# Patient Record
Sex: MALE | Race: BLACK OR AFRICAN AMERICAN | Employment: OTHER | URBAN - METROPOLITAN AREA
[De-identification: names, ages, dates, MRNs, and addresses within clinical notes are randomized per-mention and may not be internally consistent; named-entity substitution may affect disease eponyms.]

---

## 2017-01-12 ENCOUNTER — TELEPHONE (OUTPATIENT)
Dept: CARDIOLOGY CLINIC | Age: 72
End: 2017-01-12

## 2017-01-12 ENCOUNTER — OFFICE VISIT (OUTPATIENT)
Dept: CARDIOLOGY CLINIC | Age: 72
End: 2017-01-12

## 2017-01-12 VITALS
OXYGEN SATURATION: 93 % | HEART RATE: 72 BPM | RESPIRATION RATE: 16 BRPM | SYSTOLIC BLOOD PRESSURE: 204 MMHG | DIASTOLIC BLOOD PRESSURE: 100 MMHG | WEIGHT: 193.6 LBS | HEIGHT: 70 IN | BODY MASS INDEX: 27.72 KG/M2

## 2017-01-12 DIAGNOSIS — I10 MALIGNANT HYPERTENSION: ICD-10-CM

## 2017-01-12 DIAGNOSIS — I48.20 CHRONIC A-FIB (HCC): Primary | ICD-10-CM

## 2017-01-12 DIAGNOSIS — Z79.01 CHRONIC ANTICOAGULATION: ICD-10-CM

## 2017-01-12 DIAGNOSIS — N18.2 CKD (CHRONIC KIDNEY DISEASE), STAGE 2 (MILD): ICD-10-CM

## 2017-01-12 DIAGNOSIS — Z87.891 HISTORY OF TOBACCO USE: ICD-10-CM

## 2017-01-12 NOTE — PROGRESS NOTES
[address]: Malgorzata Durbin  [phone]    History of Present Illness:   Mr. Nae Newsome is a 67 yo M with a recent hospitalization with malignant hypertension. He was also found to be in atrial fibrillation, which was a new diagnosis for him. He had apparently run out of his blood pressure medications and his blood pressure was very elevated. He was started on blood pressure medications for his atrial fibrillation. Discussed Coumadin to prevent stroke. He has been taking this without any bleeding issues and is getting his INR checked at his primary care physician's. Echocardiogram at that time, 12/27/2016 was normal with an EF of 55-60% and mild mitral regurgitation. Since his hospitalization, he has been feeling well with no chest pain, shortness of breath or palpitations, lightheadedness or dizziness. He says he has been compliant with his medications. He has a way of checking his blood pressure, but has not checked this yet since he left the hospital.  His blood pressure here is elevated at 204/100. Assessment and Plan:   1. Atrial fibrillation. Heart rate is overall controlled and no changes made. He has been tolerating Coumadin and recommended this for stroke prevention. 2. Essential hypertension. He will call and reconfirm the current medications he is on. Hopefully, they correlate with his discharge medications. He will check his blood pressure daily for the next week and will make adjustments as needed. Blood pressure could be partly up with white coat hypertension. Will likely need to make a few titrations. 3. Remote tobacco use. 4. Chronic kidney disease, stage II-III. If he is not followed by nephrology, would consider this. 5. Hypokalemia. He stopped his Hydrochlorothiazide during his hospitalization. Soc hx. Quit tobacco many yrs ago  Fam hx. No early CAD    He  has a past medical history of Hypertension. All other systems negative except as above. PE  Vitals:    01/12/17 1154   BP: (!) 204/100   Pulse: 72   Resp: 16   SpO2: 93%   Weight: 193 lb 9.6 oz (87.8 kg)   Height: 5' 10\" (1.778 m)    Body mass index is 27.78 kg/(m^2). General appearance - alert, well appearing, and in no distress  Mental status - affect appropriate to mood  Eyes - sclera anicteric, moist mucous membranes  Neck - supple, no JVD  Chest - clear to auscultation, no wheezes, rales or rhonchi  Heart - irregularly irregular, normal S1, S2, I/VI systolic murmur LUSB  Abdomen - soft, nontender, nondistended, no masses or organomegaly  Neurological -no focal deficit  Extremities - peripheral pulses normal, no pedal edema      Recent Labs:  Lab Results   Component Value Date/Time    Cholesterol, total 226 12/28/2016 03:15 AM    HDL Cholesterol 56 12/28/2016 03:15 AM    LDL, calculated 153.2 12/28/2016 03:15 AM    Triglyceride 84 12/28/2016 03:15 AM    CHOL/HDL Ratio 4.0 12/28/2016 03:15 AM     Lab Results   Component Value Date/Time    Creatinine 1.61 12/30/2016 03:02 AM     Lab Results   Component Value Date/Time    BUN 27 12/30/2016 03:02 AM     Lab Results   Component Value Date/Time    Potassium 3.3 12/30/2016 03:02 AM     Lab Results   Component Value Date/Time    Hemoglobin A1c 5.4 12/28/2016 03:15 AM     Lab Results   Component Value Date/Time    HGB 15.8 12/30/2016 03:02 AM     Lab Results   Component Value Date/Time    PLATELET 875 16/11/9151 03:02 AM       Reviewed:  Past Medical History   Diagnosis Date    Hypertension      History   Smoking Status    Never Smoker   Smokeless Tobacco    Not on file     History   Alcohol Use No     No Known Allergies    Current Outpatient Prescriptions   Medication Sig    carvedilol (COREG) 12.5 mg tablet Take 1 Tab by mouth two (2) times daily (with meals).  hydrALAZINE (APRESOLINE) 50 mg tablet Take 1 Tab by mouth every eight (8) hours.  lisinopril (PRINIVIL, ZESTRIL) 20 mg tablet Take 1 Tab by mouth every twelve (12) hours.     warfarin (COUMADIN) 2.5 mg tablet Take 3 tablets tomorrow 12/31 and then 2 tablets every afternoon until you can check your INR, then the dose may have to be adjusted.  amLODIPine (NORVASC) 10 mg tablet Take 10 mg by mouth daily.  bimatoprost (LUMIGAN) 0.01 % ophthalmic drops Administer 1 Drop to both eyes.  brimonidine (ALPHAGAN P) 0.1 % ophthalmic solution Administer 1 Drop to both eyes daily.  dorzolamide (TRUSOPT) 2 % ophthalmic solution Administer 1 Drop to both eyes.  timolol (TIMOPTIC) 0.5 % ophthalmic solution Administer 1 Drop to both eyes. No current facility-administered medications for this visit.         Raymundo Akbar MD  [facility] [place]  [facility], [address] [facility] [address]  [address]

## 2017-01-12 NOTE — TELEPHONE ENCOUNTER
Pt called with his Bp reading and his list of medications. .. Christophe Park 188/114 Pt BP  Lisinopril   Hydralazine  Warfarin  Carvedilol    Pt does not know the dosage of medications. Pt can be reached [phone]. Thank you.

## 2017-01-12 NOTE — LETTER
1/13/2017 12:42 AM 
 
Patient:  Valeria Hernadez. YOB: 1945 Date of Visit: 1/12/2017 Dear Mónica Cabrera MD 
1 [address] [address] VIA Facsimile: [phone] 
 : 
 
Mr. Malathi Stein is a 67 yo M with a recent hospitalization with malignant hypertension. He was also found to be in atrial fibrillation, which was a new diagnosis for him. He had apparently run out of his blood pressure medications and his blood pressure was very elevated. He was started on blood pressure medications for his atrial fibrillation. Discussed Coumadin to prevent stroke. He has been taking this without any bleeding issues and is getting his INR checked at his primary care physician's. Echocardiogram at that time, 12/27/2016 was normal with an EF of 55-60% and mild mitral regurgitation. Since his hospitalization, he has been feeling well with no chest pain, shortness of breath or palpitations, lightheadedness or dizziness. He says he has been compliant with his medications. He has a way of checking his blood pressure, but has not checked this yet since he left the hospital.  His blood pressure here is elevated at 204/100. Assessment and Plan: 1. Atrial fibrillation. Heart rate is overall controlled and no changes made. He has been tolerating Coumadin and recommended this for stroke prevention. 2. Essential hypertension. He will call and reconfirm the current medications he is on. Hopefully, they correlate with his discharge medications. He will check his blood pressure daily for the next week and will make adjustments as needed. Blood pressure could be partly up with white coat hypertension. Will likely need to make a few titrations. 3. Remote tobacco use. 4. Chronic kidney disease, stage II-III. If he is not followed by nephrology, would consider this. 5. Hypokalemia. He stopped his Hydrochlorothiazide during his hospitalization. If you have questions, please do not hesitate to call me. Sincerely, Vivek Hastings MD

## 2017-01-12 NOTE — MR AVS SNAPSHOT
Visit Information Date & Time Provider Department Dept. Phone Encounter #  
 1/12/2017 11:00 AM Jean Tomlin MD [facility] [phone] [id number] Your Appointments 4/27/2017  8:40 AM  
ESTABLISHED PATIENT with Jean Tomlin MD  
[facility] ([address]) Appt Note: 3-4 month follow up  
 [phone]  
[address] [facility] [address] Upcoming Health Maintenance Date Due DTaP/Tdap/Td series (1 - Tdap) 1/15/1965 FOBT Q 1 YEAR AGE 50-75 1/15/1994 ZOSTER VACCINE AGE 60> 1/15/2004 GLAUCOMA SCREENING Q2Y 1/15/2009 Pneumococcal 65+ Low/Medium Risk (1 of 2 - PCV13) 1/15/2009 MEDICARE YEARLY EXAM 1/15/2009 INFLUENZA AGE 9 TO ADULT 8/1/2016 Allergies as of 1/12/2017  Review Complete On: 1/12/2017 By: Jyotsna Borjas RN No Known Allergies Current Immunizations  Never Reviewed No immunizations on file. Not reviewed this visit Vitals BP Pulse Resp Height(growth percentile) Weight(growth percentile) SpO2  
 (!) 204/100 (BP 1 Location: Right arm, BP Patient Position: Sitting) 72 16 5' 10\" (1.778 m) 193 lb 9.6 oz (87.8 kg) 93% BMI Smoking Status 27.78 kg/m2 Never Smoker BMI and BSA Data Body Mass Index Body Surface Area  
 27.78 kg/m 2 2.08 m 2 Your Updated Medication List  
  
   
This list is accurate as of: 1/12/17 12:09 PM.  Always use your most recent med list.  
  
  
  
  
 ALPHAGAN P 0.1 % ophthalmic solution Generic drug:  brimonidine Administer 1 Drop to both eyes daily. amLODIPine 10 mg tablet Commonly known as:  Nicolette Fraction Take 10 mg by mouth daily. bimatoprost 0.01 % ophthalmic drops Commonly known as:  LUMIGAN Administer 1 Drop to both eyes. carvedilol 12.5 mg tablet Commonly known as:  Brianda Hurst  
 Take 1 Tab by mouth two (2) times daily (with meals). dorzolamide 2 % ophthalmic solution Commonly known as:  TRUSOPT Administer 1 Drop to both eyes. hydrALAZINE 50 mg tablet Commonly known as:  APRESOLINE Take 1 Tab by mouth every eight (8) hours. lisinopril 20 mg tablet Commonly known as:  Dominique Harris Take 1 Tab by mouth every twelve (12) hours. timolol 0.5 % ophthalmic solution Commonly known as:  TIMOPTIC Administer 1 Drop to both eyes. warfarin 2.5 mg tablet Commonly known as:  COUMADIN Take 3 tablets tomorrow 12/31 and then 2 tablets every afternoon until you can check your INR, then the dose may have to be adjusted. Introducing [facility]! [employer] introduces QuickPay patient portal. Now you can access parts of your medical record, email your doctor's office, and request medication refills online. 1. In your internet browser, go to [email or web address] 2. Click on the First Time User? Click Here link in the Sign In box. You will see the New Member Sign Up page. 3. Enter your QuickPay Access Code exactly as it appears below. You will not need to use this code after youve completed the sign-up process. If you do not sign up before the expiration date, you must request a new code. · QuickPay Access Code: [email or web address] Expires: 3/27/2017 11:12 AM 
 
4. Enter the last four digits of your Social Security Number (xxxx) and Date of Birth (mm/dd/yyyy) as indicated and click Submit. You will be taken to the next sign-up page. 5. Create a [employer] ID. This will be your QuickPay login ID and cannot be changed, so think of one that is secure and easy to remember. 6. Create a QuickPay password. You can change your password at any time. 7. Enter your Password Reset Question and Answer. This can be used at a later time if you forget your password. 8. Enter your e-mail address. You will receive e-mail notification when new information is available in [address] 9. Click Sign Up. You can now view and download portions of your medical record. 10. Click the Download Summary menu link to download a portable copy of your medical information. If you have questions, please visit the Frequently Asked Questions section of the sezmi website. Remember, sezmi is NOT to be used for urgent needs. For medical emergencies, dial 911. Now available from your iPhone and Android! Please provide this summary of care documentation to your next provider. Your primary care clinician is listed as Moni Mejia. If you have any questions after today's visit, please call [phone].

## 2017-01-13 PROBLEM — N18.9 CKD (CHRONIC KIDNEY DISEASE): Status: ACTIVE | Noted: 2017-01-13

## 2017-01-13 PROBLEM — Z79.01 CHRONIC ANTICOAGULATION: Status: ACTIVE | Noted: 2017-01-13

## 2017-01-13 PROBLEM — I48.20 CHRONIC A-FIB (HCC): Status: ACTIVE | Noted: 2017-01-13

## 2017-01-13 PROBLEM — Z87.891 HISTORY OF TOBACCO USE: Status: ACTIVE | Noted: 2017-01-13

## 2017-01-13 NOTE — TELEPHONE ENCOUNTER
Left message for patient to return call regarding medications and blood pressure. Dr. Britany Khan would like them to add amlodipine and check blood pressure for one week and call back in one week to report on blood pressure. Patient identified with 2 identifiers  Called back and spoke with wife. Reviewed medication again with her and verified that patient is not taking and does not have any amlodipine at home. Will call in prescription for amlodipine and he will start taking it today, she will take his blood pressures twice per day, at least one hour after taking his medications, and call on Friday or next Monday with readings. She states understanding and has no further questions.

## 2017-01-16 ENCOUNTER — TELEPHONE (OUTPATIENT)
Dept: CARDIOLOGY CLINIC | Age: 72
End: 2017-01-16

## 2017-01-16 RX ORDER — AMLODIPINE BESYLATE 10 MG/1
10 TABLET ORAL DAILY
Qty: 30 TAB | Refills: 6 | Status: SHIPPED | OUTPATIENT
Start: 2017-01-16 | End: 2017-01-17 | Stop reason: SDUPTHER

## 2017-01-16 NOTE — TELEPHONE ENCOUNTER
Patient identified with 2 identifiers  Mrs. Dc Steward called back to say that patient's blood pressure was 152/84, before starting amlodipine. Prescription called in is for 10 mg. Please advise. Called patient 's wife back and advised her to start amlodipine per Dr. Tequila Betancourt.  She states understanding and will call back for any problems.

## 2017-01-17 RX ORDER — AMLODIPINE BESYLATE 10 MG/1
10 TABLET ORAL DAILY
Qty: 30 TAB | Refills: 6 | Status: SHIPPED | OUTPATIENT
Start: 2017-01-17 | End: 2017-01-30 | Stop reason: SDUPTHER

## 2017-01-30 RX ORDER — HYDRALAZINE HYDROCHLORIDE 50 MG/1
50 TABLET, FILM COATED ORAL EVERY 8 HOURS
Qty: 90 TAB | Refills: 3 | Status: SHIPPED | OUTPATIENT
Start: 2017-01-30 | End: 2017-01-30 | Stop reason: SDUPTHER

## 2017-01-30 RX ORDER — LISINOPRIL 20 MG/1
20 TABLET ORAL EVERY 12 HOURS
Qty: 60 TAB | Refills: 3 | Status: SHIPPED | OUTPATIENT
Start: 2017-01-30 | End: 2017-01-30 | Stop reason: SDUPTHER

## 2017-01-30 RX ORDER — CARVEDILOL 12.5 MG/1
12.5 TABLET ORAL 2 TIMES DAILY WITH MEALS
Qty: 60 TAB | Refills: 3 | Status: SHIPPED | OUTPATIENT
Start: 2017-01-30 | End: 2017-01-30 | Stop reason: SDUPTHER

## 2017-03-19 RX ORDER — WARFARIN 2.5 MG/1
TABLET ORAL
Qty: 30 TAB | Refills: 0 | Status: SHIPPED | OUTPATIENT
Start: 2017-03-19 | End: 2018-03-28

## 2017-06-02 RX ORDER — WARFARIN 2.5 MG/1
TABLET ORAL
Qty: 30 TAB | Refills: 0 | OUTPATIENT
Start: 2017-06-02

## 2017-06-05 RX ORDER — WARFARIN 2.5 MG/1
TABLET ORAL
Qty: 30 TAB | Refills: 0 | OUTPATIENT
Start: 2017-06-05

## 2017-06-12 RX ORDER — WARFARIN 2.5 MG/1
TABLET ORAL
Qty: 30 TAB | Refills: 0 | OUTPATIENT
Start: 2017-06-12

## 2017-06-22 ENCOUNTER — OFFICE VISIT (OUTPATIENT)
Dept: CARDIOLOGY CLINIC | Age: 72
End: 2017-06-22

## 2017-06-22 VITALS
WEIGHT: 191.4 LBS | BODY MASS INDEX: 26.8 KG/M2 | HEART RATE: 80 BPM | DIASTOLIC BLOOD PRESSURE: 94 MMHG | SYSTOLIC BLOOD PRESSURE: 160 MMHG | HEIGHT: 71 IN

## 2017-06-22 DIAGNOSIS — N18.2 CKD (CHRONIC KIDNEY DISEASE), STAGE 2 (MILD): ICD-10-CM

## 2017-06-22 DIAGNOSIS — Z79.01 CHRONIC ANTICOAGULATION: ICD-10-CM

## 2017-06-22 DIAGNOSIS — I48.20 CHRONIC A-FIB (HCC): Primary | ICD-10-CM

## 2017-06-22 DIAGNOSIS — I10 BENIGN ESSENTIAL HTN: ICD-10-CM

## 2017-06-22 NOTE — PROGRESS NOTES
[address]: Salma John  [phone]) [phone]    History of Present Illness:   Mr. Kenny Cisneros is a 65 yo M hospitalization for malignant hypertension in past and was found to be in atrial fibrillation, which was a new diagnosis for him. He had apparently run out of his blood pressure medications and his blood pressure was very elevated. Echo 12/27/2016 was normal with an EF of 55-60% and mild mitral regurgitation. Since his last visit, overall he has been doing well. He denies any palpitations. No chest pain, no shortness of breath. He has been complaint with his medications. When he checks his blood pressure at home, it has been in the 868L systolic. When he has had his blood pressure checked at his doctor's office and here today, it has been more elevated and is 160/94 here. He is compensated on exam with clear lungs. He has trace lower extremity edema. Assessment and Plan:   1. Atrial fibrillation. Continues well controlled on beta-blocker and no changes made. 2. Chronic anticoagulation. No bleeding issues on Coumadin. 3. 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 He is being followed by urology. Consider nephrology if indicated. 4. Essential hypertension. Blood pressure is labile and probably has some degree of white coat hypertension. His blood pressure at home has overall been okay in the 140s and will continue his current regimen of Norvasc, Coreg, Lisinopril and Hydralazine. 5. Hypokalemia. Occurred in the past with Hydrochlorothiazide and would avoid this. Soc hx. Quit tobacco many yrs ago  Fam hx. No early CAD    He  has a past medical history of Hypertension. All other systems negative except as above. PE  Vitals:    06/22/17 0907   BP: (!) 160/94   Pulse: 80   Weight: 191 lb 6.4 oz (86.8 kg)   Height: 5' 11\" (1.803 m)    Body mass index is 26.69 kg/(m^2).    General appearance - alert, well appearing, and in no distress  Mental status - affect appropriate to mood  Eyes - sclera anicteric, moist mucous membranes  Neck - supple, no JVD  Chest - clear to auscultation, no wheezes, rales or rhonchi  Heart - irregularly irregular, normal S1, S2, I/VI systolic murmur LUSB  Abdomen - soft, nontender, nondistended, no masses or organomegaly  Neurological -no focal deficit  Extremities - peripheral pulses normal, no pedal edema      Recent Labs:  Lab Results   Component Value Date/Time    Cholesterol, total 226 12/28/2016 03:15 AM    HDL Cholesterol 56 12/28/2016 03:15 AM    LDL, calculated 153.2 12/28/2016 03:15 AM    Triglyceride 84 12/28/2016 03:15 AM    CHOL/HDL Ratio 4.0 12/28/2016 03:15 AM     Lab Results   Component Value Date/Time    Creatinine 1.61 12/30/2016 03:02 AM     Lab Results   Component Value Date/Time    BUN 27 12/30/2016 03:02 AM     Lab Results   Component Value Date/Time    Potassium 3.3 12/30/2016 03:02 AM     Lab Results   Component Value Date/Time    Hemoglobin A1c 5.4 12/28/2016 03:15 AM     Lab Results   Component Value Date/Time    HGB 15.8 12/30/2016 03:02 AM     Lab Results   Component Value Date/Time    PLATELET 603 76/91/1333 03:02 AM       Reviewed:  Past Medical History:   Diagnosis Date    Hypertension      History   Smoking Status    Never Smoker   Smokeless Tobacco    Not on file     History   Alcohol Use No     No Known Allergies    Current Outpatient Prescriptions   Medication Sig    warfarin (COUMADIN) 2.5 mg tablet TAKE 3 TABLETS TOMORROW AND THEN TAKE 2 TABLETS EVERY AFTERNOON UNTIL YOU CAN CHECK YOUR INR,THE DOSE MAY HAVE TO BE ADJUSTED    amLODIPine (NORVASC) 10 mg tablet Take 1 Tab by mouth daily. Indications: please call patient when ready    carvedilol (COREG) 12.5 mg tablet Take 1 Tab by mouth two (2) times daily (with meals).  lisinopril (PRINIVIL, ZESTRIL) 20 mg tablet Take 1 Tab by mouth every twelve (12) hours.  hydrALAZINE (APRESOLINE) 50 mg tablet Take 1 Tab by mouth every eight (8) hours.     bimatoprost (LUMIGAN) 0.01 % ophthalmic drops Administer 1 Drop to both eyes.  brimonidine (ALPHAGAN P) 0.1 % ophthalmic solution Administer 1 Drop to both eyes daily.  dorzolamide (TRUSOPT) 2 % ophthalmic solution Administer 1 Drop to both eyes.  timolol (TIMOPTIC) 0.5 % ophthalmic solution Administer 1 Drop to both eyes. No current facility-administered medications for this visit.         Zev Gilmore MD  [facility] heart and Vascular [place]  [address] [facility]  [address]

## 2017-06-22 NOTE — MR AVS SNAPSHOT
Visit Information Date & Time Provider Department Dept. Phone Encounter #  
 6/22/2017  9:00 AM Yokasta Wilder MD [facility] [phone] [id number] Your Appointments 12/21/2017  9:40 AM  
ESTABLISHED PATIENT with Yokasta Wilder MD  
[facility] ([facility]) Appt Note: 6 month follow up  
 [phone]  
[address] [address] Upcoming Health Maintenance Date Due DTaP/Tdap/Td series (1 - Tdap) 1/15/1965 FOBT Q 1 YEAR AGE 50-75 1/15/1994 ZOSTER VACCINE AGE 60> 1/15/2004 GLAUCOMA SCREENING Q2Y 1/15/2009 Pneumococcal 65+ Low/Medium Risk (1 of 2 - PCV13) 1/15/2009 MEDICARE YEARLY EXAM 1/15/2009 INFLUENZA AGE 9 TO ADULT 8/1/2017 Allergies as of 6/22/2017  Review Complete On: 6/22/2017 By: Faviola Mitchell LPN No Known Allergies Current Immunizations  Never Reviewed No immunizations on file. Not reviewed this visit Vitals BP Pulse Height(growth percentile) Weight(growth percentile) BMI Smoking Status (!) 160/94 80 5' 11\" (1.803 m) 191 lb 6.4 oz (86.8 kg) 26.69 kg/m2 Never Smoker Vitals History BMI and BSA Data Body Mass Index Body Surface Area  
 26.69 kg/m 2 2.09 m 2 Preferred Pharmacy Pharmacy Name Phone [facility] [address] [place] [address] [phone] Your Updated Medication List  
  
   
This list is accurate as of: 6/22/17  9:37 AM.  Always use your most recent med list.  
  
  
  
  
 ALPHAGAN P 0.1 % ophthalmic solution Generic drug:  brimonidine Administer 1 Drop to both eyes daily. amLODIPine 10 mg tablet Commonly known as:  Marla Velazco Take 1 Tab by mouth daily. Indications: please call patient when ready  
  
 bimatoprost 0.01 % ophthalmic drops Commonly known as:  LUMIGAN Administer 1 Drop to both eyes. carvedilol 12.5 mg tablet Commonly known as:  Ladona Jimenez Take 1 Tab by mouth two (2) times daily (with meals). dorzolamide 2 % ophthalmic solution Commonly known as:  TRUSOPT Administer 1 Drop to both eyes. hydrALAZINE 50 mg tablet Commonly known as:  APRESOLINE Take 1 Tab by mouth every eight (8) hours. lisinopril 20 mg tablet Commonly known as:  Randall Banner Take 1 Tab by mouth every twelve (12) hours. timolol 0.5 % ophthalmic solution Commonly known as:  TIMOPTIC Administer 1 Drop to both eyes. warfarin 2.5 mg tablet Commonly known as:  COUMADIN  
TAKE 3 TABLETS TOMORROW AND THEN TAKE 2 TABLETS EVERY AFTERNOON UNTIL YOU CAN CHECK YOUR INR,THE DOSE MAY HAVE TO BE ADJUSTED Introducing [facility]! [facility] introduces Sparkcentral patient portal. Now you can access parts of your medical record, email your doctor's office, and request medication refills online. 1. In your internet browser, go to [email or web address] 2. Click on the First Time User? Click Here link in the Sign In box. You will see the New Member Sign Up page. 3. Enter your Sparkcentral Access Code exactly as it appears below. You will not need to use this code after youve completed the sign-up process. If you do not sign up before the expiration date, you must request a new code. · Sparkcentral Access Code: [email or web address] Expires: 9/19/2017  9:36 AM 
 
4. Enter the last four digits of your Social Security Number (xxxx) and Date of Birth (mm/dd/yyyy) as indicated and click Submit. You will be taken to the next sign-up page. 5. Create a Sparkcentral ID. This will be your Sparkcentral login ID and cannot be changed, so think of one that is secure and easy to remember. 6. Create a Sparkcentral password. You can change your password at any time. 7. Enter your Password Reset Question and Answer. This can be used at a later time if you forget your password. 8. Enter your e-mail address. You will receive e-mail notification when new information is available in [address] 9. Click Sign Up. You can now view and download portions of your medical record. 10. Click the Download Summary menu link to download a portable copy of your medical information. If you have questions, please visit the Frequently Asked Questions section of the [employer] website. Remember, [employer] is NOT to be used for urgent needs. For medical emergencies, dial 911. Now available from your iPhone and Android! Please provide this summary of care documentation to your next provider. Your primary care clinician is listed as Indigo Harvey. If you have any questions after today's visit, please call [phone].

## 2017-06-23 PROBLEM — I10 BENIGN ESSENTIAL HTN: Status: ACTIVE | Noted: 2017-06-23

## 2018-03-28 ENCOUNTER — APPOINTMENT (OUTPATIENT)
Dept: GENERAL RADIOLOGY | Age: 73
End: 2018-03-28
Attending: EMERGENCY MEDICINE
Payer: MEDICARE

## 2018-03-28 ENCOUNTER — HOSPITAL ENCOUNTER (EMERGENCY)
Age: 73
Discharge: HOME | End: 2018-03-28
Attending: EMERGENCY MEDICINE | Admitting: EMERGENCY MEDICINE
Payer: MEDICARE

## 2018-03-28 VITALS
TEMPERATURE: 98.6 F | RESPIRATION RATE: 28 BRPM | HEIGHT: 70 IN | OXYGEN SATURATION: 98 % | SYSTOLIC BLOOD PRESSURE: 181 MMHG | HEART RATE: 96 BPM | WEIGHT: 184 LBS | BODY MASS INDEX: 26.34 KG/M2 | DIASTOLIC BLOOD PRESSURE: 110 MMHG

## 2018-03-28 DIAGNOSIS — I48.91 ATRIAL FIBRILLATION, UNSPECIFIED TYPE (HCC): ICD-10-CM

## 2018-03-28 DIAGNOSIS — Z01.89 LABORATORY TEST: Primary | ICD-10-CM

## 2018-03-28 LAB
ALBUMIN SERPL-MCNC: 3.7 G/DL (ref 3.5–5)
ALBUMIN/GLOB SERPL: 0.8 {RATIO} (ref 1.1–2.2)
ALP SERPL-CCNC: 74 U/L (ref 45–117)
ALT SERPL-CCNC: 19 U/L (ref 12–78)
ANION GAP SERPL CALC-SCNC: 10 MMOL/L (ref 5–15)
APPEARANCE UR: CLEAR
AST SERPL-CCNC: 28 U/L (ref 15–37)
ATRIAL RATE: 100 BPM
ATRIAL RATE: 52 BPM
BACTERIA URNS QL MICRO: NEGATIVE /HPF
BASOPHILS # BLD: 0 K/UL (ref 0–0.1)
BASOPHILS NFR BLD: 0 % (ref 0–1)
BILIRUB SERPL-MCNC: 0.8 MG/DL (ref 0.2–1)
BILIRUB UR QL: NEGATIVE
BUN SERPL-MCNC: 17 MG/DL (ref 6–20)
BUN/CREAT SERPL: 12 (ref 12–20)
CALCIUM SERPL-MCNC: 9.2 MG/DL (ref 8.5–10.1)
CALCULATED R AXIS, ECG10: -20 DEGREES
CALCULATED R AXIS, ECG10: 7 DEGREES
CALCULATED T AXIS, ECG11: -151 DEGREES
CALCULATED T AXIS, ECG11: 158 DEGREES
CHLORIDE SERPL-SCNC: 104 MMOL/L (ref 97–108)
CO2 SERPL-SCNC: 27 MMOL/L (ref 21–32)
COLOR UR: YELLOW
CREAT SERPL-MCNC: 1.46 MG/DL (ref 0.7–1.3)
DIAGNOSIS, 93000: NORMAL
DIAGNOSIS, 93000: NORMAL
DIFFERENTIAL METHOD BLD: ABNORMAL
EOSINOPHIL # BLD: 0 K/UL (ref 0–0.4)
EOSINOPHIL NFR BLD: 0 % (ref 0–7)
EPITH CASTS URNS QL MICRO: NORMAL /LPF
ERYTHROCYTE [DISTWIDTH] IN BLOOD BY AUTOMATED COUNT: 14.5 % (ref 11.5–14.5)
GLOBULIN SER CALC-MCNC: 4.5 G/DL (ref 2–4)
GLUCOSE SERPL-MCNC: 110 MG/DL (ref 65–100)
GLUCOSE UR STRIP.AUTO-MCNC: NEGATIVE MG/DL
HCT VFR BLD AUTO: 49.2 % (ref 36.6–50.3)
HGB BLD-MCNC: 17 G/DL (ref 12.1–17)
HGB UR QL STRIP: NEGATIVE
IMM GRANULOCYTES # BLD: 0 K/UL (ref 0–0.04)
IMM GRANULOCYTES NFR BLD AUTO: 0 % (ref 0–0.5)
KETONES UR QL STRIP.AUTO: NORMAL MG/DL
LEUKOCYTE ESTERASE UR QL STRIP.AUTO: NEGATIVE
LYMPHOCYTES # BLD: 1.3 K/UL (ref 0.8–3.5)
LYMPHOCYTES NFR BLD: 19 % (ref 12–49)
MAGNESIUM SERPL-MCNC: 1.9 MG/DL (ref 1.6–2.4)
MCH RBC QN AUTO: 29.4 PG (ref 26–34)
MCHC RBC AUTO-ENTMCNC: 34.6 G/DL (ref 30–36.5)
MCV RBC AUTO: 85.1 FL (ref 80–99)
MONOCYTES # BLD: 0.5 K/UL (ref 0–1)
MONOCYTES NFR BLD: 7 % (ref 5–13)
NEUTS SEG # BLD: 5.1 K/UL (ref 1.8–8)
NEUTS SEG NFR BLD: 74 % (ref 32–75)
NITRITE UR QL STRIP.AUTO: NEGATIVE
NRBC # BLD: 0 K/UL (ref 0–0.01)
NRBC BLD-RTO: 0 PER 100 WBC
P-R INTERVAL, ECG05: 224 MS
PH UR STRIP: 7 [PH]
PLATELET # BLD AUTO: 225 K/UL (ref 150–400)
PMV BLD AUTO: 11.7 FL (ref 8.9–12.9)
POTASSIUM SERPL-SCNC: 3.4 MMOL/L (ref 3.5–5.1)
PROT SERPL-MCNC: 8.2 G/DL (ref 6.4–8.2)
PROT UR STRIP-MCNC: NORMAL MG/DL
Q-T INTERVAL, ECG07: 386 MS
Q-T INTERVAL, ECG07: 390 MS
QRS DURATION, ECG06: 124 MS
QRS DURATION, ECG06: 130 MS
QTC CALCULATION (BEZET), ECG08: 515 MS
QTC CALCULATION (BEZET), ECG08: 538 MS
RBC # BLD AUTO: 5.78 M/UL (ref 4.1–5.7)
RBC #/AREA URNS HPF: NORMAL /HPF (ref 0–5)
SODIUM SERPL-SCNC: 141 MMOL/L (ref 136–145)
SP GR UR REFRACTOMETRY: 1.01
TROPONIN I SERPL-MCNC: 0.04 NG/ML
UR CULT HOLD, URHOLD: NORMAL
UROBILINOGEN UR QL STRIP.AUTO: 0.2 EU/DL
VENTRICULAR RATE, ECG03: 105 BPM
VENTRICULAR RATE, ECG03: 117 BPM
WBC # BLD AUTO: 6.9 K/UL (ref 4.1–11.1)
WBC URNS QL MICRO: NORMAL /HPF (ref 0–4)

## 2018-03-28 PROCEDURE — 85025 COMPLETE CBC W/AUTO DIFF WBC: CPT | Performed by: PHYSICIAN ASSISTANT

## 2018-03-28 PROCEDURE — 96361 HYDRATE IV INFUSION ADD-ON: CPT

## 2018-03-28 PROCEDURE — 71045 X-RAY EXAM CHEST 1 VIEW: CPT

## 2018-03-28 PROCEDURE — 81001 URINALYSIS AUTO W/SCOPE: CPT | Performed by: EMERGENCY MEDICINE

## 2018-03-28 PROCEDURE — 84484 ASSAY OF TROPONIN QUANT: CPT | Performed by: EMERGENCY MEDICINE

## 2018-03-28 PROCEDURE — 80053 COMPREHEN METABOLIC PANEL: CPT | Performed by: PHYSICIAN ASSISTANT

## 2018-03-28 PROCEDURE — 83735 ASSAY OF MAGNESIUM: CPT | Performed by: EMERGENCY MEDICINE

## 2018-03-28 PROCEDURE — 74011000250 HC RX REV CODE- 250: Performed by: EMERGENCY MEDICINE

## 2018-03-28 PROCEDURE — 93005 ELECTROCARDIOGRAM TRACING: CPT

## 2018-03-28 PROCEDURE — 74011250636 HC RX REV CODE- 250/636: Performed by: EMERGENCY MEDICINE

## 2018-03-28 PROCEDURE — 96374 THER/PROPH/DIAG INJ IV PUSH: CPT

## 2018-03-28 PROCEDURE — 74011250637 HC RX REV CODE- 250/637: Performed by: EMERGENCY MEDICINE

## 2018-03-28 PROCEDURE — 99285 EMERGENCY DEPT VISIT HI MDM: CPT

## 2018-03-28 PROCEDURE — 36415 COLL VENOUS BLD VENIPUNCTURE: CPT | Performed by: PHYSICIAN ASSISTANT

## 2018-03-28 RX ORDER — LISINOPRIL 40 MG/1
40 TABLET ORAL 2 TIMES DAILY
COMMUNITY

## 2018-03-28 RX ORDER — METOPROLOL TARTRATE 5 MG/5ML
5 INJECTION INTRAVENOUS
Status: COMPLETED | OUTPATIENT
Start: 2018-03-28 | End: 2018-03-28

## 2018-03-28 RX ORDER — LEVOTHYROXINE SODIUM 25 UG/1
25 TABLET ORAL
COMMUNITY

## 2018-03-28 RX ORDER — POTASSIUM CHLORIDE 750 MG/1
40 TABLET, FILM COATED, EXTENDED RELEASE ORAL
Status: COMPLETED | OUTPATIENT
Start: 2018-03-28 | End: 2018-03-28

## 2018-03-28 RX ADMIN — POTASSIUM CHLORIDE 40 MEQ: 750 TABLET, EXTENDED RELEASE ORAL at 19:06

## 2018-03-28 RX ADMIN — METOPROLOL TARTRATE 5 MG: 5 INJECTION, SOLUTION INTRAVENOUS at 18:57

## 2018-03-28 RX ADMIN — SODIUM CHLORIDE 1000 ML: 900 INJECTION, SOLUTION INTRAVENOUS at 17:55

## 2018-03-28 NOTE — ED TRIAGE NOTES
Reports had blood drawn this morning and was told he had low potassium. Wife reports increased fatigue. Denies N/V/D.   Denies Chest pain

## 2018-03-28 NOTE — DISCHARGE INSTRUCTIONS
Atrial Fibrillation: Care Instructions  Your Care Instructions    Atrial fibrillation is an irregular and often fast heartbeat. Treating this condition is important for several reasons. It can cause blood clots, which can travel from your heart to your brain and cause a stroke. If you have a fast heartbeat, you may feel lightheaded, dizzy, and weak. An irregular heartbeat can also increase your risk for heart failure. Atrial fibrillation is often the result of another heart condition, such as high blood pressure or coronary artery disease. Making changes to improve your heart condition will help you stay healthy and active. Follow-up care is a key part of your treatment and safety. Be sure to make and go to all appointments, and call your doctor if you are having problems. It's also a good idea to know your test results and keep a list of the medicines you take. How can you care for yourself at home? Medicines  ? · Take your medicines exactly as prescribed. Call your doctor if you think you are having a problem with your medicine. You will get more details on the specific medicines your doctor prescribes. ? · If your doctor has given you a blood thinner to prevent a stroke, be sure you get instructions about how to take your medicine safely. Blood thinners can cause serious bleeding problems. ? · Do not take any vitamins, over-the-counter drugs, or herbal products without talking to your doctor first.   ? Lifestyle changes  ? · Do not smoke. Smoking can increase your chance of a stroke and heart attack. If you need help quitting, talk to your doctor about stop-smoking programs and medicines. These can increase your chances of quitting for good. ? · Eat a heart-healthy diet. ? · Stay at a healthy weight. Lose weight if you need to.   ? · Limit alcohol to 2 drinks a day for men and 1 drink a day for women. Too much alcohol can cause health problems. ? · Avoid colds and flu.  Get a pneumococcal vaccine shot. If you have had one before, ask your doctor whether you need another dose. Get a flu shot every year. If you must be around people with colds or flu, wash your hands often. Activity  ? · If your doctor recommends it, get more exercise. Walking is a good choice. Bit by bit, increase the amount you walk every day. Try for at least 30 minutes on most days of the week. You also may want to swim, bike, or do other activities. Your doctor may suggest that you join a cardiac rehabilitation program so that you can have help increasing your physical activity safely. ? · Start light exercise if your doctor says it is okay. Even a small amount will help you get stronger, have more energy, and manage stress. Walking is an easy way to get exercise. Start out by walking a little more than you did in the hospital. Gradually increase the amount you walk. ? · When you exercise, watch for signs that your heart is working too hard. You are pushing too hard if you cannot talk while you are exercising. If you become short of breath or dizzy or have chest pain, sit down and rest immediately. ? · Check your pulse regularly. Place two fingers on the artery at the palm side of your wrist, in line with your thumb. If your heartbeat seems uneven or fast, talk to your doctor. When should you call for help? Call 911 anytime you think you may need emergency care. For example, call if:  ? · You have symptoms of a heart attack. These may include:  ¨ Chest pain or pressure, or a strange feeling in the chest.  ¨ Sweating. ¨ Shortness of breath. ¨ Nausea or vomiting. ¨ Pain, pressure, or a strange feeling in the back, neck, jaw, or upper belly or in one or both shoulders or arms. ¨ Lightheadedness or sudden weakness. ¨ A fast or irregular heartbeat. After you call 911, the  may tell you to chew 1 adult-strength or 2 to 4 low-dose aspirin. Wait for an ambulance. Do not try to drive yourself.    ? · You have symptoms of a stroke. These may include:  ¨ Sudden numbness, tingling, weakness, or loss of movement in your face, arm, or leg, especially on only one side of your body. ¨ Sudden vision changes. ¨ Sudden trouble speaking. ¨ Sudden confusion or trouble understanding simple statements. ¨ Sudden problems with walking or balance. ¨ A sudden, severe headache that is different from past headaches. ? · You passed out (lost consciousness). ?Call your doctor now or seek immediate medical care if:  ? · You have new or increased shortness of breath. ? · You feel dizzy or lightheaded, or you feel like you may faint. ? · Your heart rate becomes irregular. ? · You can feel your heart flutter in your chest or skip heartbeats. Tell your doctor if these symptoms are new or worse. ? Watch closely for changes in your health, and be sure to contact your doctor if you have any problems. Where can you learn more? Go to http://fanny-rosy.info/. Enter U020 in the search box to learn more about \"Atrial Fibrillation: Care Instructions. \"  Current as of: September 21, 2016  Content Version: 11.4  © 9203-3070 [employer]. Care instructions adapted under license by [employer] (which disclaims liability or warranty for this information). If you have questions about a medical condition or this instruction, always ask your healthcare professional. [address] any warranty or liability for your use of this information. Potassium Test: About This Test  What is it? A potassium test checks how much potassium is in your blood or urine. Potassium helps keep the body's water and electrolytes in balance. It is also important in how nerves and muscles work. Potassium levels can be tested by having a blood sample taken or by collecting your urine. Urine potassium can be checked in a single urine sample. But it is more often measured in a 24-hour urine sample.   Why is this test done? A blood or urine test for potassium may be done to:  · Check how well your kidneys are working. · Check levels if you are being treated with medicines such as diuretics or having kidney dialysis. · See if treatment for low or high potassium levels is working. How can you prepare for the test?  · You do not need to do anything before having this test.  · Be sure to tell your doctor about all the nonprescription and prescription medicines and herbs or other supplements you take. There are many medicines and supplements that can affect the results of these tests. What happens before the test?  For 24-hour urine collection, your doctor or lab will usually give you a large container that holds about 1 gallon. What happens during the test?  Blood test  A health professional takes a sample of your blood. One-time urine collection  · Wash your hands to make sure they are clean before you collect the urine. · If the collection cup has a lid, remove it carefully. Set it down with the inner surface up. Do not touch the inside of the cup with your fingers. · Clean the area around your genitals. [de-identified] A man should pull back the foreskin, if present, and clean the head of his penis with medicated towelettes or swabs. ¨ A woman should spread open the genital folds of skin with one hand. Then she should use her other hand to clean the area around the urethra with medicated towelettes or swabs. She should wipe the area from front to back so bacteria from the anus are not wiped across the urethra. · Start urinating into the toilet or urinal. A woman should hold apart the genital folds of skin while she urinates. · After the urine has flowed for a few seconds, place the collection cup into the urine stream. Collect about 2 fluid ounces of this \"midstream\" urine without stopping your flow of urine. · Do not touch the rim of the cup to your genital area.  Do not get toilet paper, pubic hair, stool (feces), menstrual blood, or anything else in the urine sample. · Finish urinating into the toilet or urinal.  · Carefully replace and tighten the lid on the cup. Then return it to the lab. If you are collecting the urine at home and can't get it to the lab in an hour, refrigerate it. Urine collection over 24 hours  · You start collecting your urine in the morning. When you first get up, empty your bladder but do not save this urine. Write down the time that you urinated. This marks the beginning of your 24-hour collection period. · For the next 24 hours, collect all your urine. The large container from your doctor or lab has a small amount of preservative in it. Urinate into a small, clean cup, and then pour the urine into the large container. Do not touch the inside of the container or the cup with your fingers. · Keep the large container in the refrigerator for the 24 hours. · Empty your bladder for the final time at or just before the end of the 24-hour period. Add this urine to the large container and record the time. · Do not get toilet paper, pubic hair, stool (feces), menstrual blood, or other foreign matter in the urine sample. How long does the test take? · A blood test or one-time urine collection will probably take a few minutes. Or you may collect your urine over a period of 24 hours. What happens after the test?  After a blood test:  · You will probably be able to go home right away. · You can go back to your usual activities right away. After a urine test, you will need to return the urine sample to the lab or the doctor's office. When should you call for help? Watch closely for changes in your health, and be sure to contact your doctor if you have questions about the test.  Follow-up care is a key part of your treatment and safety. Be sure to make and go to all appointments, and call your doctor if you are having problems. It's also a good idea to keep a list of the medicines you take.  Ask your doctor when you can expect to have your test results. Where can you learn more? Go to http://fanny-rosy.info/. Enter A911 in the search box to learn more about \"Potassium Test: About This Test.\"  Current as of: October 14, 2016  Content Version: 11.4  © 4813-2146 [employer]. Care instructions adapted under license by [employer] (which disclaims liability or warranty for this information). If you have questions about a medical condition or this instruction, always ask your healthcare professional. [address] any warranty or liability for your use of this information.

## 2018-03-28 NOTE — ED PROVIDER NOTES
HPI Comments: 75 y.o. male with past medical history significant for HTN, a-fib, and CKD who presents from home with chief complaint of abnormal lab result. Pt states he had a follow up appointment with his PCP this morning and had blood work done. Pt was informed he had a low potassium value and was referred to the ED for evaluation. Pt denies history of hypokalemia. Pt regularly takes Amlodipine and Coumadin. Pt denies history of MI, blood clots, or CVA. Pt notes that he has had frequent urination. Pt denies having weakness, SOB, or chest pain. There are no other acute medical concerns at this time. PCP: Nathan Dominguez MD    Note written by Kim Horta, as dictated by Adebayo Nam MD 5:30 PM    The history is provided by the patient and the spouse. Past Medical History:   Diagnosis Date    Atrial fibrillation ([facility] [address]     CKD (chronic kidney disease)     Hypertension        No past surgical history on file. No family history on file. Social History     Social History    Marital status:      Spouse name: N/A    Number of children: N/A    Years of education: N/A     Occupational History    Not on file. Social History Main Topics    Smoking status: Never Smoker    Smokeless tobacco: Not on file    Alcohol use No    Drug use: Not on file    Sexual activity: Not on file     Other Topics Concern    Not on file     Social History Narrative         ALLERGIES: Review of patient's allergies indicates no known allergies. Review of Systems   Constitutional: Negative for activity change, chills and fever. HENT: Negative for nosebleeds, sore throat, trouble swallowing and voice change. Eyes: Negative for visual disturbance. Respiratory: Negative for shortness of breath. Cardiovascular: Negative for chest pain and palpitations. Gastrointestinal: Negative for abdominal pain, constipation, diarrhea and nausea.    Genitourinary: Positive for frequency. Negative for difficulty urinating, dysuria, hematuria and urgency. Musculoskeletal: Negative for back pain, neck pain and neck stiffness. Skin: Negative for color change. Allergic/Immunologic: Negative for immunocompromised state. Neurological: Negative for dizziness, seizures, syncope, weakness, light-headedness, numbness and headaches. Psychiatric/Behavioral: Negative for behavioral problems, confusion, hallucinations, self-injury and suicidal ideas. All other systems reviewed and are negative. Vitals:    03/28/18 1641 03/28/18 1738 03/28/18 1739 03/28/18 1749   BP: 175/71  (!) 144/122    Pulse:  (!) 115 (!) 112    Resp:  21     Temp:  99.6 °F (37.6 °C)     SpO2:  96% 96% 97%   Weight:       Height:                Physical Exam   Constitutional: He is oriented to person, place, and time. He appears well-developed and well-nourished. No distress. HENT:   Head: Normocephalic and atraumatic. Eyes: Pupils are equal, round, and reactive to light. Neck: Normal range of motion. Neck supple. Cardiovascular: Regular rhythm and normal heart sounds. Tachycardia present. Exam reveals no gallop and no friction rub. No murmur heard. Pulmonary/Chest: Effort normal and breath sounds normal. No respiratory distress. He has no wheezes. Abdominal: Soft. Bowel sounds are normal. He exhibits no distension. There is no tenderness. There is no rebound and no guarding. Musculoskeletal: Normal range of motion. Neurological: He is alert and oriented to person, place, and time. Skin: Skin is warm. No rash noted. He is not diaphoretic. Psychiatric: He has a normal mood and affect. His behavior is normal. Judgment and thought content normal.   Nursing note and vitals reviewed. Note written by Grant Shaikh.  Jj Ortiz, as dictated by Michele Moseley MD 5:30 PM       [facility]      ED Course     This is a 79-year-old male with past medical history, review of systems, physical exam as above, presenting with complaints of abnormal lab results. Patient states she was told by his pharmacy that he was required to have lab results called by his primary care physician for refill of medications, was told today that his potassium was low, it is not know what the specific value was. He denies symptoms, including chest pain, shortness of breath, weakness. He is noted to be tachycardic, with clear breath sounds, soft nontender abdomen, normotensive, and afebrile. EKG is remarkable for tachycardia, white QRS, ST depression in the lateral fields that were present on his last study. Plan to obtain CMP, CBC, UA, magnesium, cardiac enzymes, provide fluid bolus for tachycardia, and make a disposition based the patient's diagnostics and response to therapy. Procedures    ED EKG interpretation (1st EKG): Rhythm: tachycardia with PVC's; Rate (approx.): 117 bpm; Axis: left axis deviation; LVH. Wide QRS with ST depression in leads V4-V6. Note written by Ignacia Gray. Jaciel Aguayo, as dictated by Kylee Lauren MD 5:45 PM      ED EKG interpretation (2nd EKG): Rhythm: a-fib with aberrancy and PVC's; Rate (approx.): 105 bpm;   Note written by Ignaica Gray. Jaciel Aguayo, as dictated by Kylee Lauren MD 6:40 PM      7:08 PM  Patient with K+ mildly low, will replete. EKG with afib with abberrancy, PVC's, however without CP or SOB. Will provide IV metop as some runs have been tachycardic, and reassess. 7:42 PM  HE and EKG improvied with additional BBlockade, remains asymptomatic. Will Dc home with PCP and Cardiology follow up.

## 2018-03-28 NOTE — ED NOTES
Pt up and ambulating without getting tachycardic or dizzy. Discharge instructions given to pt by MD and RN . Pt has been given counseling on med use and verbalizes  understanding . IV discontinued . Pt ambulated off unit with no signs of distress.

## 2018-03-28 NOTE — ED NOTES
ECG repeated due to pts heart rate Increasing when patient stood to urinate , no complaints of chest pain , shortness of breath , or dizziness . Strip printed and given to MD. Repeat EKG obtained. Awaiting further orders.

## 2018-04-30 ENCOUNTER — TELEPHONE (OUTPATIENT)
Dept: CARDIOLOGY CLINIC | Age: 73
End: 2018-04-30

## 2018-04-30 NOTE — TELEPHONE ENCOUNTER
LVM for patient/spouse to return call at earliest convenience. Patient will need an appointment prior to clearance.

## 2018-04-30 NOTE — TELEPHONE ENCOUNTER
Patients wife is calling to check on the cardiac clearance for him to come off his warfrin for the prostate procedure   Phone: [phone]

## 2018-05-01 ENCOUNTER — TELEPHONE (OUTPATIENT)
Dept: CARDIOLOGY CLINIC | Age: 73
End: 2018-05-01

## 2018-05-01 NOTE — TELEPHONE ENCOUNTER
Per Dr. Adriana Loomis patient does not need to be seen prior to cardiac clearance. Appointment has been cancelled and clearance letter faxed.   2 pt identifiers used

## 2018-05-01 NOTE — TELEPHONE ENCOUNTER
Spoke to patients wife and advised her that patient needs to be seen for clearance for the procedure. Patient was scheduled for 5/4/18 @ 10:00am. Patients wife verbalized understanding. Patient id verified x2.

## 2019-01-07 ENCOUNTER — HOSPITAL ENCOUNTER (OUTPATIENT)
Dept: CT IMAGING | Age: 74
Discharge: HOME | End: 2019-01-07
Attending: UROLOGY
Payer: MEDICARE

## 2019-01-07 DIAGNOSIS — R31.9 HEMATURIA: ICD-10-CM

## 2019-01-07 LAB — CREAT BLD-MCNC: 1.4 MG/DL (ref 0.6–1.3)

## 2019-01-07 PROCEDURE — 82565 ASSAY OF CREATININE: CPT

## 2019-01-07 PROCEDURE — 74011636320 HC RX REV CODE- 636/320: Performed by: UROLOGY

## 2019-01-07 PROCEDURE — 74178 CT ABD&PLV WO CNTR FLWD CNTR: CPT

## 2019-01-07 PROCEDURE — 74011250636 HC RX REV CODE- 250/636: Performed by: UROLOGY

## 2019-01-07 RX ORDER — SODIUM CHLORIDE 0.9 % (FLUSH) 0.9 %
10 SYRINGE (ML) INJECTION
Status: COMPLETED | OUTPATIENT
Start: 2019-01-07 | End: 2019-01-07

## 2019-01-07 RX ORDER — SODIUM CHLORIDE 9 MG/ML
50 INJECTION, SOLUTION INTRAVENOUS
Status: COMPLETED | OUTPATIENT
Start: 2019-01-07 | End: 2019-01-07

## 2019-01-07 RX ADMIN — Medication 10 ML: at 08:00

## 2019-01-07 RX ADMIN — SODIUM CHLORIDE 50 ML/HR: 900 INJECTION, SOLUTION INTRAVENOUS at 08:00

## 2019-01-07 RX ADMIN — IOPAMIDOL 100 ML: 755 INJECTION, SOLUTION INTRAVENOUS at 08:00

## 2020-08-31 ENCOUNTER — APPOINTMENT (OUTPATIENT)
Dept: GENERAL RADIOLOGY | Age: 75
End: 2020-08-31
Attending: EMERGENCY MEDICINE
Payer: MEDICARE

## 2020-08-31 ENCOUNTER — HOSPITAL ENCOUNTER (EMERGENCY)
Age: 75
Discharge: HOME | End: 2020-08-31
Attending: EMERGENCY MEDICINE
Payer: MEDICARE

## 2020-08-31 VITALS
HEART RATE: 76 BPM | DIASTOLIC BLOOD PRESSURE: 91 MMHG | SYSTOLIC BLOOD PRESSURE: 155 MMHG | RESPIRATION RATE: 18 BRPM | TEMPERATURE: 97.4 F | OXYGEN SATURATION: 96 %

## 2020-08-31 DIAGNOSIS — I10 HYPERTENSION, UNSPECIFIED TYPE: Primary | ICD-10-CM

## 2020-08-31 LAB
ALBUMIN SERPL-MCNC: 3.8 G/DL (ref 3.5–5)
ALBUMIN/GLOB SERPL: 0.9 {RATIO} (ref 1.1–2.2)
ALP SERPL-CCNC: 61 U/L (ref 45–117)
ALT SERPL-CCNC: 24 U/L (ref 12–78)
ANION GAP SERPL CALC-SCNC: 5 MMOL/L (ref 5–15)
APTT PPP: 27.6 SEC (ref 22.1–32)
AST SERPL-CCNC: 24 U/L (ref 15–37)
ATRIAL RATE: 49 BPM
BASOPHILS # BLD: 0 K/UL (ref 0–0.1)
BASOPHILS NFR BLD: 0 % (ref 0–1)
BILIRUB SERPL-MCNC: 0.9 MG/DL (ref 0.2–1)
BUN SERPL-MCNC: 22 MG/DL (ref 6–20)
BUN/CREAT SERPL: 14 (ref 12–20)
CALCIUM SERPL-MCNC: 9.1 MG/DL (ref 8.5–10.1)
CALCULATED R AXIS, ECG10: -14 DEGREES
CALCULATED T AXIS, ECG11: 145 DEGREES
CHLORIDE SERPL-SCNC: 106 MMOL/L (ref 97–108)
CO2 SERPL-SCNC: 29 MMOL/L (ref 21–32)
COMMENT, HOLDF: NORMAL
CREAT SERPL-MCNC: 1.55 MG/DL (ref 0.7–1.3)
DIAGNOSIS, 93000: NORMAL
DIFFERENTIAL METHOD BLD: NORMAL
EOSINOPHIL # BLD: 0 K/UL (ref 0–0.4)
EOSINOPHIL NFR BLD: 0 % (ref 0–7)
ERYTHROCYTE [DISTWIDTH] IN BLOOD BY AUTOMATED COUNT: 14.4 % (ref 11.5–14.5)
GLOBULIN SER CALC-MCNC: 4.2 G/DL (ref 2–4)
GLUCOSE SERPL-MCNC: 116 MG/DL (ref 65–100)
HCT VFR BLD AUTO: 47.9 % (ref 36.6–50.3)
HGB BLD-MCNC: 16.1 G/DL (ref 12.1–17)
IMM GRANULOCYTES # BLD AUTO: 0 K/UL (ref 0–0.04)
IMM GRANULOCYTES NFR BLD AUTO: 0 % (ref 0–0.5)
INR PPP: 1.1 (ref 0.9–1.1)
LYMPHOCYTES # BLD: 1 K/UL (ref 0.8–3.5)
LYMPHOCYTES NFR BLD: 19 % (ref 12–49)
MAGNESIUM SERPL-MCNC: 2.1 MG/DL (ref 1.6–2.4)
MCH RBC QN AUTO: 28.5 PG (ref 26–34)
MCHC RBC AUTO-ENTMCNC: 33.6 G/DL (ref 30–36.5)
MCV RBC AUTO: 84.8 FL (ref 80–99)
MONOCYTES # BLD: 0.4 K/UL (ref 0–1)
MONOCYTES NFR BLD: 7 % (ref 5–13)
NEUTS SEG # BLD: 3.6 K/UL (ref 1.8–8)
NEUTS SEG NFR BLD: 74 % (ref 32–75)
NRBC # BLD: 0 K/UL (ref 0–0.01)
NRBC BLD-RTO: 0 PER 100 WBC
PLATELET # BLD AUTO: 210 K/UL (ref 150–400)
PMV BLD AUTO: 11.4 FL (ref 8.9–12.9)
POTASSIUM SERPL-SCNC: 2.9 MMOL/L (ref 3.5–5.1)
PROT SERPL-MCNC: 8 G/DL (ref 6.4–8.2)
PROTHROMBIN TIME: 11.4 SEC (ref 9–11.1)
Q-T INTERVAL, ECG07: 368 MS
QRS DURATION, ECG06: 122 MS
QTC CALCULATION (BEZET), ECG08: 472 MS
RBC # BLD AUTO: 5.65 M/UL (ref 4.1–5.7)
SAMPLES BEING HELD,HOLD: NORMAL
SODIUM SERPL-SCNC: 140 MMOL/L (ref 136–145)
THERAPEUTIC RANGE,PTTT: NORMAL SECS (ref 58–77)
VENTRICULAR RATE, ECG03: 99 BPM
WBC # BLD AUTO: 5 K/UL (ref 4.1–11.1)

## 2020-08-31 PROCEDURE — 83735 ASSAY OF MAGNESIUM: CPT

## 2020-08-31 PROCEDURE — 85610 PROTHROMBIN TIME: CPT

## 2020-08-31 PROCEDURE — 93005 ELECTROCARDIOGRAM TRACING: CPT

## 2020-08-31 PROCEDURE — 85025 COMPLETE CBC W/AUTO DIFF WBC: CPT

## 2020-08-31 PROCEDURE — 71045 X-RAY EXAM CHEST 1 VIEW: CPT

## 2020-08-31 PROCEDURE — 74011250637 HC RX REV CODE- 250/637: Performed by: EMERGENCY MEDICINE

## 2020-08-31 PROCEDURE — 36415 COLL VENOUS BLD VENIPUNCTURE: CPT

## 2020-08-31 PROCEDURE — 85730 THROMBOPLASTIN TIME PARTIAL: CPT

## 2020-08-31 PROCEDURE — 80053 COMPREHEN METABOLIC PANEL: CPT

## 2020-08-31 PROCEDURE — 99285 EMERGENCY DEPT VISIT HI MDM: CPT

## 2020-08-31 RX ORDER — CARVEDILOL 12.5 MG/1
12.5 TABLET ORAL
Status: COMPLETED | OUTPATIENT
Start: 2020-08-31 | End: 2020-08-31

## 2020-08-31 RX ORDER — LISINOPRIL 10 MG/1
40 TABLET ORAL ONCE
Status: COMPLETED | OUTPATIENT
Start: 2020-08-31 | End: 2020-08-31

## 2020-08-31 RX ORDER — CLONIDINE HYDROCHLORIDE 0.1 MG/1
0.2 TABLET ORAL
Status: COMPLETED | OUTPATIENT
Start: 2020-08-31 | End: 2020-08-31

## 2020-08-31 RX ADMIN — CARVEDILOL 12.5 MG: 12.5 TABLET, FILM COATED ORAL at 22:41

## 2020-08-31 RX ADMIN — CLONIDINE HYDROCHLORIDE 0.2 MG: 0.1 TABLET ORAL at 21:47

## 2020-08-31 RX ADMIN — LISINOPRIL 40 MG: 10 TABLET ORAL at 22:41

## 2020-08-31 NOTE — ED TRIAGE NOTES
He says he went to see his doctor today for a routine visit. He says he was sent here for an irregular heartbeat and elevated blood pressure. He says his doctor wanted it to be \"gotten under control\". He denies pain.

## 2020-09-01 NOTE — ED NOTES
Assumed care of patient. He states he comes from home after his PCP advised he come to the ED for HTN. His wife at is at bedside. Call bell within reach. Awaiting evaln by provider.

## 2020-09-01 NOTE — ED NOTES
Patient (s)  given copy of dc instructions. Patient (s)  verbalized understanding of instructions. Patient given a current medication reconciliation form and verbalized understanding of their medications. Patient (s) verbalized understanding of the importance of discussing medications with  his or her physician or clinic they will be following up with. Patient alert and oriented and in no acute distress. Patient discharged home ambulatory with all belongings and wife.

## 2020-09-01 NOTE — DISCHARGE INSTRUCTIONS
You will need to continue your regular medications on a routine basis. Follow-up with Dr. Tay Hutchins in the next several days for a recheck of your blood pressure and further evaluation of the medications you are currently taking.

## 2020-09-01 NOTE — ED PROVIDER NOTES
This is a 53-year-old male with a history of atrial fibrillation, chronic kidney disease and hypertension. He is on a number of medications at the present time although, the patient states he has not been seen by physician in about 2 years. He went to see his primary care physician today and was told he had an extremely high blood pressure and was sent to the ED. Apparently the family doctor is been filling his prescriptions but it is been several years since the patient has been seen. We do not know what his normal blood pressures have been nor do we know how long his blood pressure is been elevated to the level it is currently. He is also on warfarin and he has no idea why. He does have chronic A. fib and may be the reason for the anticoagulation however it is unknown. The patient denies any headache, chest pain, shortness of breath, nausea or vomiting and no bowel or bladder issues. He has had no swelling in his legs and no leg pain. He denies any chest pain or shortness of breath on exertion. There is no history of stroke, cardiovascular disease or diabetes. Past Medical History:   Diagnosis Date    Atrial fibrillation ([address]     CKD (chronic kidney disease)     Hypertension        History reviewed. No pertinent surgical history. No family history on file.     Social History     Socioeconomic History    Marital status:      Spouse name: Not on file    Number of children: Not on file    Years of education: Not on file    Highest education level: Not on file   Occupational History    Not on file   Social Needs    Financial resource strain: Not on file    Food insecurity     Worry: Not on file     Inability: Not on file    Transportation needs     Medical: Not on file     Non-medical: Not on file   Tobacco Use    Smoking status: Never Smoker   Substance and Sexual Activity    Alcohol use: No    Drug use: Not on file    Sexual activity: Not on file   Lifestyle    Physical activity     Days per week: Not on file     Minutes per session: Not on file    Stress: Not on file   Relationships    Social connections     Talks on phone: Not on file     Gets together: Not on file     Attends Sabianism service: Not on file     Active member of club or organization: Not on file     Attends meetings of clubs or organizations: Not on file     Relationship status: Not on file    Intimate partner violence     Fear of current or ex partner: Not on file     Emotionally abused: Not on file     Physically abused: Not on file     Forced sexual activity: Not on file   Other Topics Concern    Not on file   Social History Narrative    Not on file         ALLERGIES: Patient has no known allergies. Review of Systems   Constitutional: Negative for activity change, appetite change and fatigue. HENT: Negative for ear pain, facial swelling, sore throat and trouble swallowing. Eyes: Negative for pain, discharge and visual disturbance. Respiratory: Negative for chest tightness, shortness of breath and wheezing. Cardiovascular: Negative for chest pain and palpitations. Gastrointestinal: Negative for abdominal pain, blood in stool, nausea and vomiting. Genitourinary: Negative for difficulty urinating, flank pain and hematuria. Musculoskeletal: Negative for arthralgias, joint swelling, myalgias and neck pain. Skin: Negative for color change and rash. Neurological: Negative for dizziness, weakness, numbness and headaches. Hematological: Negative for adenopathy. Does not bruise/bleed easily. Psychiatric/Behavioral: Negative for behavioral problems, confusion and sleep disturbance. All other systems reviewed and are negative. Vitals:    08/31/20 1621 08/31/20 2106   BP: 147/85 (!) 204/138   Pulse: (!) 104 91   Resp: 16 18   Temp: 97.4 °F (36.3 °C) 97.4 °F (36.3 °C)   SpO2: 97% 98%            Physical Exam  Vitals signs and nursing note reviewed.    Constitutional: General: He is not in acute distress. Appearance: He is well-developed. HENT:      Head: Normocephalic and atraumatic. Nose: Nose normal.   Eyes:      General: No scleral icterus. Conjunctiva/sclera: Conjunctivae normal.      Pupils: Pupils are equal, round, and reactive to light. Neck:      Musculoskeletal: Normal range of motion and neck supple. Thyroid: No thyromegaly. Vascular: No JVD. Trachea: No tracheal deviation. Comments: No carotid bruits noted. Cardiovascular:      Rate and Rhythm: Normal rate and regular rhythm. Heart sounds: Normal heart sounds. No murmur. No friction rub. No gallop. Pulmonary:      Effort: Pulmonary effort is normal. No respiratory distress. Breath sounds: Normal breath sounds. No wheezing or rales. Chest:      Chest wall: No tenderness. Abdominal:      General: Bowel sounds are normal. There is no distension. Palpations: Abdomen is soft. There is no mass. Tenderness: There is no abdominal tenderness. There is no guarding or rebound. Musculoskeletal: Normal range of motion. General: No tenderness. Lymphadenopathy:      Cervical: No cervical adenopathy. Skin:     General: Skin is warm and dry. Findings: No erythema or rash. Neurological:      Mental Status: He is alert and oriented to person, place, and time. Cranial Nerves: No cranial nerve deficit. Coordination: Coordination normal.      Deep Tendon Reflexes: Reflexes are normal and symmetric. Psychiatric:         Behavior: Behavior normal.         Thought Content:  Thought content normal.         Judgment: Judgment normal.          MDM  Number of Diagnoses or Management Options     Amount and/or Complexity of Data Reviewed  Clinical lab tests: reviewed and ordered  Tests in the radiology section of CPT®: ordered and reviewed  Decide to obtain previous medical records or to obtain history from someone other than the patient: yes  Review and summarize past medical records: yes  Discuss the patient with other providers: yes  Independent visualization of images, tracings, or specimens: yes    Risk of Complications, Morbidity, and/or Mortality  Presenting problems: high  Diagnostic procedures: high  Management options: high    Patient Progress  Patient progress: stable         Procedures    It is noted that the patient arrived at 412 and the nurse approached me at [phone] about a blood pressure the patient was experiencing in the room. The patient's blood pressure is significantly elevated, however, the patient is totally asymptomatic. Likewise it is unknown how long his pressure is been at this level as he has not seen his family doctor for the last 2 years. The doctor he seen is been a urologist.  There is been no comment on his pressure. Has not taken any of his medications today which include 3 medicines for blood pressure. He is also on warfarin and it is unknown when his last INR was checked. We will give the patient some clonidine and likely some of his regular blood pressure medication and observe while obtaining labs. The objective will be to decrease the pressure by 20 to 30%. This would be over several hours. ED MD EKG interpretation: Patient is in atrial fib with ventricular rate of 99 beats a minute. There is a left axis shift noted. He has occasional PVCs. Poor R wave progression is noted. No other acute significant changes are appreciated. The patient's blood pressure is responding to the Klonopin. He is currently at 176/113. Again the patient is totally asymptomatic. All of his labs appear to be nonacute. Potassium is slightly low will give a dose of potassium. Suspect patient may be discharged home to continue his regular blood pressure medications and to follow-up with Dr. Rah Rose within the next several days for reevaluation. Patient's blood pressure currently is 155/77.   He is asymptomatic so will discharge home to continue his current medications and follow-up with Dr. Amanda Sim in the next few days.

## 2020-10-28 ENCOUNTER — OFFICE VISIT (OUTPATIENT)
Dept: CARDIOLOGY CLINIC | Age: 75
End: 2020-10-28
Payer: MEDICARE

## 2020-10-28 VITALS
HEART RATE: 77 BPM | WEIGHT: 188 LBS | DIASTOLIC BLOOD PRESSURE: 100 MMHG | OXYGEN SATURATION: 96 % | BODY MASS INDEX: 26.92 KG/M2 | SYSTOLIC BLOOD PRESSURE: 160 MMHG | HEIGHT: 70 IN | RESPIRATION RATE: 18 BRPM

## 2020-10-28 DIAGNOSIS — E87.6 HYPOKALEMIA: ICD-10-CM

## 2020-10-28 DIAGNOSIS — N18.2 CKD (CHRONIC KIDNEY DISEASE) STAGE 2, GFR 60-89 ML/MIN: ICD-10-CM

## 2020-10-28 DIAGNOSIS — Z79.01 CHRONIC ANTICOAGULATION: ICD-10-CM

## 2020-10-28 DIAGNOSIS — Z87.891 HISTORY OF TOBACCO USE: ICD-10-CM

## 2020-10-28 DIAGNOSIS — I48.0 PAROXYSMAL A-FIB (HCC): Primary | ICD-10-CM

## 2020-10-28 DIAGNOSIS — I10 BENIGN ESSENTIAL HTN: ICD-10-CM

## 2020-10-28 PROCEDURE — G8510 SCR DEP NEG, NO PLAN REQD: HCPCS | Performed by: INTERNAL MEDICINE

## 2020-10-28 PROCEDURE — G8427 DOCREV CUR MEDS BY ELIG CLIN: HCPCS | Performed by: INTERNAL MEDICINE

## 2020-10-28 PROCEDURE — G8753 SYS BP > OR = 140: HCPCS | Performed by: INTERNAL MEDICINE

## 2020-10-28 PROCEDURE — G8755 DIAS BP > OR = 90: HCPCS | Performed by: INTERNAL MEDICINE

## 2020-10-28 PROCEDURE — 99203 OFFICE O/P NEW LOW 30 MIN: CPT | Performed by: INTERNAL MEDICINE

## 2020-10-28 PROCEDURE — G8419 CALC BMI OUT NRM PARAM NOF/U: HCPCS | Performed by: INTERNAL MEDICINE

## 2020-10-28 PROCEDURE — 1101F PT FALLS ASSESS-DOCD LE1/YR: CPT | Performed by: INTERNAL MEDICINE

## 2020-10-28 PROCEDURE — G8536 NO DOC ELDER MAL SCRN: HCPCS | Performed by: INTERNAL MEDICINE

## 2020-10-28 RX ORDER — ROSUVASTATIN CALCIUM 20 MG/1
20 TABLET, COATED ORAL
COMMUNITY

## 2020-10-28 NOTE — LETTER
10/28/2020 3:04 PM 
 
Patient:  Carson Thapa. YOB: 1945 Date of Visit: 10/28/2020 Dear Silva Robledo. Jay Tai, [address] [address] [place] [id number] VIA Facsimile: [phone]: Thank you for referring Mr. Fern Schirmer to me for evaluation/treatment. Below are the relevant portions of my assessment and plan of care. Mr. Pilar Awad is a 68 yo M hospitalization for malignant hypertension in past and was found to be in atrial fibrillation, which was a new diagnosis for him. He had apparently run out of his blood pressure medications and his blood pressure was very elevated. Echo 12/27/2016 was normal with an EF of 55-60% and mild mitral regurgitation. He was told to follow up by his primary care physician because it had been a while since he had a checkup here. With regard to his atrial fibrillation, he does not have palpitations. No chest pain and his breathing has been stable. No bleeding issues on anticoagulation. He was recently changed from his Coumadin to Eliquis and has tolerated this well. His blood pressure is elevated here at 160/100 and he has a blood pressure monitor, but has not been checking this regularly at home. Recommended he follow this closely and would make adjustment if needed. He is compensated on exam with clear lungs and no lower extremity edema. Assessment and Plan: 1. Atrial fibrillation. Continues well controlled and no changes made. Will have him follow back in one year. 2. Chronic anticoagulation. No bleeding issues on Eliquis. This can be held for 48 hours prior to surgeries or procedures. 3. Essential hypertension. Blood pressure is elevated here and could be white coat hypertension. He will check his blood pressures closely at home, check it daily and call us in a week. If his blood pressure continues elevated, may need to increase his Hydralazine. 4. 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 Would consider regular followup with nephrology if indicated. He had blood work recently with his primary care physician. 5. Hypokalemia. This occurred with Hydrochlorothiazide in the past and would avoid this. If you have questions, please do not hesitate to call me. Sincerely, Kami Greer MD

## 2020-10-28 NOTE — PROGRESS NOTES
[address]: Edgardo Muñoz  [phone]    History of Present Illness:   Mr. Pilar Awad is a 68 yo M hospitalization for malignant hypertension in past and was found to be in atrial fibrillation, which was a new diagnosis for him. He had apparently run out of his blood pressure medications and his blood pressure was very elevated. Echo 12/27/2016 was normal with an EF of 55-60% and mild mitral regurgitation. He was told to follow up by his primary care physician because it had been a while since he had a checkup here. With regard to his atrial fibrillation, he does not have palpitations. No chest pain and his breathing has been stable. No bleeding issues on anticoagulation. He was recently changed from his Coumadin to Eliquis and has tolerated this well. His blood pressure is elevated here at 160/100 and he has a blood pressure monitor, but has not been checking this regularly at home. Recommended he follow this closely and would make adjustment if needed. He is compensated on exam with clear lungs and no lower extremity edema. Assessment and Plan:    1. Atrial fibrillation. Continues well controlled and no changes made. Will have him follow back in one year. 2. Chronic anticoagulation. No bleeding issues on Eliquis. This can be held for 48 hours prior to surgeries or procedures. 3. Essential hypertension. Blood pressure is elevated here and could be white coat hypertension. He will check his blood pressures closely at home, check it daily and call us in a week. If his blood pressure continues elevated, may need to increase his Hydralazine. 4. 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 Would consider regular followup with nephrology if indicated. He had blood work recently with his primary care physician. 5. Hypokalemia. This occurred with Hydrochlorothiazide in the past and would avoid this. Soc hx. Quit tobacco many yrs ago  Fam hx.  No early CAD    He  has a past medical history of Atrial fibrillation ([address] CKD (chronic kidney disease), and Hypertension. All other systems negative except as above. PE  Vitals:    10/28/20 0926   BP: (!) 160/100   Pulse: 77   Resp: 18   SpO2: 96%   Weight: 188 lb (85.3 kg)   Height: 5' 10\" (1.778 m)    Body mass index is 26.98 kg/m².    General appearance - alert, well appearing, and in no distress  Mental status - affect appropriate to mood  Eyes - sclera anicteric, moist mucous membranes  Neck - supple, no JVD  Chest - clear to auscultation, no wheezes, rales or rhonchi  Heart - irregularly irregular, normal S1, S2, I/VI systolic murmur LUSB  Abdomen - soft, nontender, nondistended, no masses or organomegaly  Neurological -no focal deficit  Extremities - peripheral pulses normal, no pedal edema      Recent Labs:  Lab Results   Component Value Date/Time    Cholesterol, total 226 (H) 12/28/2016 03:15 AM    HDL Cholesterol 56 12/28/2016 03:15 AM    LDL, calculated 153.2 (H) 12/28/2016 03:15 AM    Triglyceride 84 12/28/2016 03:15 AM    CHOL/HDL Ratio 4.0 12/28/2016 03:15 AM     Lab Results   Component Value Date/Time    Creatinine (POC) 1.4 (H) 01/07/2019 08:05 AM    Creatinine 1.55 (H) 08/31/2020 04:45 PM     Lab Results   Component Value Date/Time    BUN 22 (H) 08/31/2020 04:45 PM     Lab Results   Component Value Date/Time    Potassium 2.9 (L) 08/31/2020 04:45 PM     Lab Results   Component Value Date/Time    Hemoglobin A1c 5.4 12/28/2016 03:15 AM     Lab Results   Component Value Date/Time    HGB 16.1 08/31/2020 04:45 PM     Lab Results   Component Value Date/Time    PLATELET 841 58/55/7407 04:45 PM       Reviewed:  Past Medical History:   Diagnosis Date    Atrial fibrillation (HCC)     CKD (chronic kidney disease)     Hypertension      Social History     Tobacco Use   Smoking Status Never Smoker   Smokeless Tobacco Never Used     Social History     Substance and Sexual Activity   Alcohol Use No     No Known Allergies    Current Outpatient Medications   Medication Sig    rosuvastatin (CRESTOR) 20 mg tablet Take 20 mg by mouth nightly.  amLODIPine (NORVASC) 10 mg tablet TAKE ONE TABLET BY MOUTH ONCE DAILY (Patient taking differently: 10 mg daily.)    lisinopril (PRINIVIL, ZESTRIL) 40 mg tablet Take 40 mg by mouth two (2) times a day.  carvedilol (COREG) 12.5 mg tablet Take 1 Tab by mouth two (2) times daily (with meals).  bimatoprost (LUMIGAN) 0.01 % ophthalmic drops Administer 1 Drop to both eyes.  brimonidine (ALPHAGAN P) 0.1 % ophthalmic solution Administer 1 Drop to both eyes daily.  dorzolamide (TRUSOPT) 2 % ophthalmic solution Administer 1 Drop to both eyes.  timolol (TIMOPTIC) 0.5 % ophthalmic solution Administer 1 Drop to both eyes.  levothyroxine (SYNTHROID) 25 mcg tablet Take 25 mcg by mouth Daily (before breakfast).  hydrALAZINE (APRESOLINE) 50 mg tablet Take 1 Tab by mouth every eight (8) hours. No current facility-administered medications for this visit.         Aureliano Rodgers MD  [facility] [place]  [address] [facility] [address]  [address]

## 2021-08-03 PROBLEM — I10 BENIGN ESSENTIAL HTN: Status: RESOLVED | Noted: 2017-06-23 | Resolved: 2021-08-03

## 2021-10-27 ENCOUNTER — OFFICE VISIT (OUTPATIENT)
Dept: CARDIOLOGY CLINIC | Age: 76
End: 2021-10-27
Payer: MEDICARE

## 2021-10-27 VITALS
OXYGEN SATURATION: 99 % | BODY MASS INDEX: 27.77 KG/M2 | WEIGHT: 194 LBS | HEART RATE: 47 BPM | DIASTOLIC BLOOD PRESSURE: 80 MMHG | RESPIRATION RATE: 16 BRPM | HEIGHT: 70 IN | SYSTOLIC BLOOD PRESSURE: 126 MMHG

## 2021-10-27 DIAGNOSIS — Z79.01 CHRONIC ANTICOAGULATION: ICD-10-CM

## 2021-10-27 DIAGNOSIS — I10 BENIGN ESSENTIAL HTN: ICD-10-CM

## 2021-10-27 DIAGNOSIS — N18.2 CKD (CHRONIC KIDNEY DISEASE) STAGE 2, GFR 60-89 ML/MIN: ICD-10-CM

## 2021-10-27 DIAGNOSIS — I48.0 PAROXYSMAL A-FIB (HCC): Primary | ICD-10-CM

## 2021-10-27 PROCEDURE — G8752 SYS BP LESS 140: HCPCS | Performed by: INTERNAL MEDICINE

## 2021-10-27 PROCEDURE — G8510 SCR DEP NEG, NO PLAN REQD: HCPCS | Performed by: INTERNAL MEDICINE

## 2021-10-27 PROCEDURE — G8427 DOCREV CUR MEDS BY ELIG CLIN: HCPCS | Performed by: INTERNAL MEDICINE

## 2021-10-27 PROCEDURE — G8754 DIAS BP LESS 90: HCPCS | Performed by: INTERNAL MEDICINE

## 2021-10-27 PROCEDURE — 1101F PT FALLS ASSESS-DOCD LE1/YR: CPT | Performed by: INTERNAL MEDICINE

## 2021-10-27 PROCEDURE — G8419 CALC BMI OUT NRM PARAM NOF/U: HCPCS | Performed by: INTERNAL MEDICINE

## 2021-10-27 PROCEDURE — G8536 NO DOC ELDER MAL SCRN: HCPCS | Performed by: INTERNAL MEDICINE

## 2021-10-27 PROCEDURE — 99214 OFFICE O/P EST MOD 30 MIN: CPT | Performed by: INTERNAL MEDICINE

## 2021-10-27 RX ORDER — FINASTERIDE 5 MG/1
TABLET, FILM COATED ORAL
COMMUNITY
Start: 2021-08-28

## 2021-10-27 NOTE — LETTER
Patient:  Venus Charles. YOB: 1945  Date of Visit: 10/27/2021      Dear Lennox Learn. Lynne Gilmore, [address] [facility]  [address]  [place] [id number]  Via Fax: [phone]:    Mr. Rosa Maria Mcgill is a 66 yo M hospitalization for malignant hypertension in past and was found to be in atrial fibrillation, which was a new diagnosis for him. He had apparently run out of his blood pressure medications and his blood pressure was very elevated. Echo 12/27/2016 was normal with an EF of 55-60% and mild mitral regurgitation. Since his last visit, he has been doing well. He denies any significant palpitations. His breathing has been stable. No exertional chest pain. His kidney function has been stable as well. He has been compliant with his medications. He does say he has been cutting back on sodas as well. He is compensated on exam with clear lungs and no lower extremity edema. Soc hx. Quit tobacco many yrs ago  Fam hx. No early CAD  Assessment and Plan:   1. Atrial fibrillation. Well controlled and no changes made. Will have him follow back in one year. 2. Chronic anticoagulation. No bleeding issues on Xarelto. He was on Eliquis previously, but this was changed due to cost reasons. He can hold this 48 hours prior to surgeries or procedures. 3. Essential hypertension. Blood pressure is controlled. 4. 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    5. Hypokalemia. This occurred in the past with Hydrochlorothiazide and would avoid this. If you have questions, please do not hesitate to call me.      Sincerely,      Leanna Saleem MD

## 2021-10-27 NOTE — PROGRESS NOTES
[address]: Mateus Giron  [phone]    History of Present Illness:   Mr. Milton Priest is a 68 yo M hospitalization for malignant hypertension in past and was found to be in atrial fibrillation, which was a new diagnosis for him. He had apparently run out of his blood pressure medications and his blood pressure was very elevated. Echo 12/27/2016 was normal with an EF of 55-60% and mild mitral regurgitation. Since his last visit, he has been doing well. He denies any significant palpitations. His breathing has been stable. No exertional chest pain. His kidney function has been stable as well. He has been compliant with his medications. He does say he has been cutting back on sodas as well. He is compensated on exam with clear lungs and no lower extremity edema. Soc hx. Quit tobacco many yrs ago  Fam hx. No early CAD  Assessment and Plan:   1. Atrial fibrillation. Well controlled and no changes made. Will have him follow back in one year. 2. Chronic anticoagulation. No bleeding issues on Xarelto. He was on Eliquis previously, but this was changed due to cost reasons. He can hold this 48 hours prior to surgeries or procedures. 3. Essential hypertension. Blood pressure is controlled. 4. 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    5. Hypokalemia. This occurred in the past with Hydrochlorothiazide and would avoid this. He  has a past medical history of Atrial fibrillation ([address] CKD (chronic kidney disease), and Hypertension. All other systems negative except as above. PE  Vitals:    10/27/21 1026   BP: 126/80   Pulse: (!) 47   Resp: 16   SpO2: 99%   Weight: 194 lb (88 kg)   Height: 5' 10\" (1.778 m)    Body mass index is 27.84 kg/m².    General appearance - alert, well appearing, and in no distress  Mental status - affect appropriate to mood  Eyes - sclera anicteric, moist mucous membranes  Neck - supple, no JVD  Chest - clear to auscultation, no wheezes, rales or rhonchi  Heart - irregularly irregular, normal S1, S2, I/VI systolic murmur LUSB  Abdomen - soft, nontender, nondistended, no masses or organomegaly  Neurological -no focal deficit  Extremities - peripheral pulses normal, no pedal edema      Recent Labs:  Lab Results   Component Value Date/Time    Cholesterol, total 226 (H) 12/28/2016 03:15 AM    HDL Cholesterol 56 12/28/2016 03:15 AM    LDL, calculated 153.2 (H) 12/28/2016 03:15 AM    Triglyceride 84 12/28/2016 03:15 AM    CHOL/HDL Ratio 4.0 12/28/2016 03:15 AM     Lab Results   Component Value Date/Time    Creatinine (POC) 1.4 (H) 01/07/2019 08:05 AM    Creatinine 1.55 (H) 08/31/2020 04:45 PM     Lab Results   Component Value Date/Time    BUN 22 (H) 08/31/2020 04:45 PM     Lab Results   Component Value Date/Time    Potassium 2.9 (L) 08/31/2020 04:45 PM     Lab Results   Component Value Date/Time    Hemoglobin A1c 5.4 12/28/2016 03:15 AM     Lab Results   Component Value Date/Time    HGB 16.1 08/31/2020 04:45 PM     Lab Results   Component Value Date/Time    PLATELET 172 06/10/8063 04:45 PM       Reviewed:  Past Medical History:   Diagnosis Date    Atrial fibrillation (HCC)     CKD (chronic kidney disease)     Hypertension      Social History     Tobacco Use   Smoking Status Never Smoker   Smokeless Tobacco Never Used     Social History     Substance and Sexual Activity   Alcohol Use No     No Known Allergies    Current Outpatient Medications   Medication Sig    finasteride (PROSCAR) 5 mg tablet     rivaroxaban (XARELTO PO) Take  by mouth.  rosuvastatin (CRESTOR) 20 mg tablet Take 20 mg by mouth nightly.  amLODIPine (NORVASC) 10 mg tablet TAKE ONE TABLET BY MOUTH ONCE DAILY (Patient taking differently: 10 mg daily.)    levothyroxine (SYNTHROID) 25 mcg tablet Take 25 mcg by mouth Daily (before breakfast).  lisinopril (PRINIVIL, ZESTRIL) 40 mg tablet Take 40 mg by mouth two (2) times a day.     carvedilol (COREG) 12.5 mg tablet Take 1 Tab by mouth two (2) times daily (with meals).  hydrALAZINE (APRESOLINE) 50 mg tablet Take 1 Tab by mouth every eight (8) hours.  bimatoprost (LUMIGAN) 0.01 % ophthalmic drops Administer 1 Drop to both eyes.  brimonidine (ALPHAGAN P) 0.1 % ophthalmic solution Administer 1 Drop to both eyes daily.  dorzolamide (TRUSOPT) 2 % ophthalmic solution Administer 1 Drop to both eyes.  timolol (TIMOPTIC) 0.5 % ophthalmic solution Administer 1 Drop to both eyes. No current facility-administered medications for this visit.        Usha Awad MD  [facility] heart and Vascular [place]  [address] [facility] [address]  [facility], [address]

## 2022-03-18 PROBLEM — Z79.01 CHRONIC ANTICOAGULATION: Status: ACTIVE | Noted: 2017-01-13

## 2022-03-18 PROBLEM — Z87.891 HISTORY OF TOBACCO USE: Status: ACTIVE | Noted: 2017-01-13

## 2022-03-18 PROBLEM — I48.20 CHRONIC A-FIB (HCC): Status: ACTIVE | Noted: 2017-01-13

## 2022-03-19 PROBLEM — N18.9 CKD (CHRONIC KIDNEY DISEASE): Status: ACTIVE | Noted: 2017-01-13

## 2023-08-08 ENCOUNTER — HOSPITAL ENCOUNTER (EMERGENCY)
Facility: HOSPITAL | Age: 78
Discharge: HOME | End: 2023-08-08
Attending: STUDENT IN AN ORGANIZED HEALTH CARE EDUCATION/TRAINING PROGRAM
Payer: MEDICARE

## 2023-08-08 ENCOUNTER — TELEPHONE (OUTPATIENT)
Age: 78
End: 2023-08-08

## 2023-08-08 VITALS
HEART RATE: 57 BPM | RESPIRATION RATE: 16 BRPM | DIASTOLIC BLOOD PRESSURE: 66 MMHG | SYSTOLIC BLOOD PRESSURE: 123 MMHG | OXYGEN SATURATION: 96 % | TEMPERATURE: 98.2 F

## 2023-08-08 DIAGNOSIS — I48.20 CHRONIC ATRIAL FIBRILLATION (HCC): ICD-10-CM

## 2023-08-08 DIAGNOSIS — R06.7 SNEEZING: Primary | ICD-10-CM

## 2023-08-08 PROCEDURE — 99283 EMERGENCY DEPT VISIT LOW MDM: CPT

## 2023-08-08 ASSESSMENT — PAIN - FUNCTIONAL ASSESSMENT: PAIN_FUNCTIONAL_ASSESSMENT: NONE - DENIES PAIN

## 2023-08-08 NOTE — DISCHARGE INSTRUCTIONS
Follow up with your cardiologist to be reevaluated and return for chest pain, shortness of breath, lightheadedness or dizziness, or passing out.
[FreeTextEntry1] : 71 year man with a history as listed presents for a followup cardiac evaluation. \par Wagner is relatively doing well. He denies any anginal symptoms. Clinically he is euvolemic on exam. His dyspnea on exertion is likely from deconditioning. He had an exercise stress test unrevealing for ischemia on 3/2019 with fair exercise tolerance. He had an echo in 4/2019  that showed normal systolic LV function without any significant other findings, including no significant valvular disease. I encouraged him to exercise more. He had no significant carotid disease. \par His blood pressure and heart rate are controlled. He will continue   lisinopril 20mg Qday.  \par At your convenience, please fax me his latest lab results including lipid profile.  \par Exercise and diet counseling was performed in order to reduce her future cardiovascular risk. \par He will followup with me in 6 months or sooner if necessary. \par

## 2023-08-08 NOTE — ED TRIAGE NOTES
Went to [facility] with wife today to get check for COVID today because she was having cold symptoms. [facility] did an EKG today and found him to be in Afib. They sent him to ED. Patient has known history of Afib and is on xarelto. Denies any complaints today other than sneezing.

## 2023-08-08 NOTE — TELEPHONE ENCOUNTER
Spoke with NP Ksenia Mohamud at [facility], 2 patient identifiers used. [facility] is at [facility] and has an irregular pulse and she would like advice on what to do. Let NP know that patient has not been seen since 10/2021 and at that visit his afib was addressed but he was supposed to follow up in one year. Let her know Dr. Shakira Eisenberg is out of office. Discussed patient with Jordy Diego NP. Per NP if patient stable he can go home ans see her on Monday. If not, she will give a call back due to being in clinic. Justo Abdullahi NP of this. Per NP patient is not symptomatic and his HR is not elevated. EKG had not been done and per NP EKG will be done on him. Let NP know if patient could be provided with his records from today so that he can bring them to appointment on Monday. Time of appointment and address provided to NP.      Future Appointments   Date Time Provider [address]  [address]   8/14/2023  9:00 AM MYESHA Robertson - RHODA CAV BS AMB

## 2023-08-08 NOTE — ED NOTES
Attempted to find patient to give AVS after registration but patient had already left ED.       Josesito Plascencia RN  08/08/23 9731

## 2023-08-13 LAB
EKG DIAGNOSIS: NORMAL
EKG Q-T INTERVAL: 444 MS
EKG QRS DURATION: 118 MS
EKG QTC CALCULATION (BAZETT): 465 MS
EKG R AXIS: -5 DEGREES
EKG T AXIS: 193 DEGREES
EKG VENTRICULAR RATE: 66 BPM

## 2023-08-14 ENCOUNTER — TELEPHONE (OUTPATIENT)
Age: 78
End: 2023-08-14

## 2023-08-14 DIAGNOSIS — I49.3 PVC (PREMATURE VENTRICULAR CONTRACTION): Primary | ICD-10-CM

## 2023-08-14 NOTE — TELEPHONE ENCOUNTER
Called pt, Wife answered, name and  verified, Pt's wife stated that she doesn't know why he went to ER, when  informed that her 's EKG was abnormal per [facility] and they have to f/up, she stated that she has to think about it and lots going on, so when she settles her life she will call to schedule . Informed that [facility] has ordered some non fasting labs which he needs to get ASAP. Pt's wife verbalized understanding. Called PCP and requested Labs and LOV notes   Nothing further at this moment.

## 2024-02-07 ENCOUNTER — OFFICE VISIT (OUTPATIENT)
Age: 79
End: 2024-02-07
Payer: MEDICARE

## 2024-02-07 VITALS
DIASTOLIC BLOOD PRESSURE: 62 MMHG | WEIGHT: 187 LBS | BODY MASS INDEX: 26.77 KG/M2 | HEIGHT: 70 IN | SYSTOLIC BLOOD PRESSURE: 116 MMHG | HEART RATE: 50 BPM | OXYGEN SATURATION: 100 %

## 2024-02-07 DIAGNOSIS — I48.20 CHRONIC A-FIB (HCC): ICD-10-CM

## 2024-02-07 DIAGNOSIS — Z79.01 CHRONIC ANTICOAGULATION: ICD-10-CM

## 2024-02-07 DIAGNOSIS — I20.0 UNSTABLE ANGINA (HCC): Primary | ICD-10-CM

## 2024-02-07 DIAGNOSIS — I10 BENIGN ESSENTIAL HTN: ICD-10-CM

## 2024-02-07 PROCEDURE — 99214 OFFICE O/P EST MOD 30 MIN: CPT | Performed by: INTERNAL MEDICINE

## 2024-02-07 RX ORDER — POTASSIUM CHLORIDE 1500 MG/1
40 TABLET, EXTENDED RELEASE ORAL
COMMUNITY
Start: 2023-12-21

## 2024-02-07 RX ORDER — TORSEMIDE 20 MG/1
40 TABLET ORAL
COMMUNITY
Start: 2023-12-21

## 2024-02-07 NOTE — PATIENT INSTRUCTIONS
You will be scheduled for a Nuclear Stress Test and echo after your appointment today.    Nuclear stress testing evaluates blood flow to your heart muscle and assesses cardiac function. There are 2 parts (Rest/Stress) to this procedure and will include either an exercise on a treadmill or an IV administration of a stressing medication called Lexiscan. Your cardiologist will determine which type of testing is best for you. This test can be performed in one day unless it is determined that better quality images will be obtained by performing the test over two days.     *Please arrive 15 minutes prior to your appointment time    Test Duration:    -One day testing will take 4 hours       Day of testing instructions:    NO CAFFEINE (not even decaffeinated products) 24 HOURS PRIOR TO TESTING. This includes coffee, soda, tea, chocolate, multivitamins, and migraine medication, like Excedrin or Fioricet that contains caffeine.  Nothing to eat or drink 4 HOURS prior to testing  NO NICOTINE 12 hours prior to testing  Hold any medications requested by your cardiologist. Otherwise take medications as directed with a few sips of water. If you are unsure you may bring your medications with you to take after instructed by your stressing nurse. It is recommended you do not hold your meds prior to your test. DIABETIC PATIENTS: Take half of your insulin with a light meal 4 hours before your test.  Wear comfortable clothes and shoes (Shirts with no metal, shorts or pants, tennis shoes, no heels or flip flops)    IMPORTANT: This testing involves a cardiac tracer ordered specifically for you. If you are unable to make your appointment, please call to cancel/reschedule AT LEAST 24 hours prior to your appointment so your tracer can be cancelled. [phone].

## 2024-02-07 NOTE — PROGRESS NOTES
(LUMIGAN) 0.01 % SOLN ophthalmic drops Apply 1 drop to eye    brimonidine (ALPHAGAN P) 0.1 % SOLN Apply 1 drop to eye daily    carvedilol (COREG) 12.5 MG tablet Take 1 tablet by mouth 2 times daily (with meals)    dorzolamide (TRUSOPT) 2 % ophthalmic solution Apply 1 drop to eye    lisinopril (PRINIVIL;ZESTRIL) 40 MG tablet Take 1 tablet by mouth 2 times daily    rosuvastatin (CRESTOR) 20 MG tablet Take 1 tablet by mouth nightly    timolol (TIMOPTIC) 0.5 % ophthalmic solution Apply 1 drop to eye    amLODIPine (NORVASC) 10 MG tablet Take 1 tablet by mouth daily    finasteride (PROSCAR) 5 MG tablet ceived the following from [facility]: Outside name: finasteride (PROSCAR) 5 mg tablet    hydrALAZINE (APRESOLINE) 50 MG tablet Take 1 tablet by mouth in the morning and 1 tablet at noon and 1 tablet in the evening.    levothyroxine (SYNTHROID) 25 MCG tablet Take 1 tablet by mouth every morning (before breakfast) (Patient not taking: Reported on 2/7/2024)     No current facility-administered medications for this visit.       José Antonio Rico MD  [facility] [place]  [address], [address]  [place] [address]

## 2024-03-07 ENCOUNTER — ANCILLARY PROCEDURE (OUTPATIENT)
Age: 79
End: 2024-03-07
Payer: MEDICARE

## 2024-03-07 ENCOUNTER — TELEPHONE (OUTPATIENT)
Age: 79
End: 2024-03-07

## 2024-03-07 VITALS
HEIGHT: 70 IN | DIASTOLIC BLOOD PRESSURE: 70 MMHG | SYSTOLIC BLOOD PRESSURE: 104 MMHG | BODY MASS INDEX: 26.77 KG/M2 | HEART RATE: 83 BPM | WEIGHT: 187 LBS

## 2024-03-07 DIAGNOSIS — I20.0 UNSTABLE ANGINA (HCC): ICD-10-CM

## 2024-03-07 DIAGNOSIS — I10 BENIGN ESSENTIAL HTN: ICD-10-CM

## 2024-03-07 DIAGNOSIS — I48.20 CHRONIC A-FIB (HCC): ICD-10-CM

## 2024-03-07 DIAGNOSIS — Z79.01 CHRONIC ANTICOAGULATION: ICD-10-CM

## 2024-03-07 LAB
ECHO AO ASC DIAM: 4.1 CM
ECHO AO ASCENDING AORTA INDEX: 2.02 CM/M2
ECHO AO ROOT DIAM: 4.2 CM
ECHO AO ROOT INDEX: 2.07 CM/M2
ECHO AV AREA PEAK VELOCITY: 1.9 CM2
ECHO AV AREA VTI: 1.8 CM2
ECHO AV AREA/BSA PEAK VELOCITY: 0.9 CM2/M2
ECHO AV AREA/BSA VTI: 0.9 CM2/M2
ECHO AV MEAN GRADIENT: 3 MMHG
ECHO AV MEAN VELOCITY: 0.8 M/S
ECHO AV PEAK GRADIENT: 5 MMHG
ECHO AV PEAK VELOCITY: 1.2 M/S
ECHO AV VELOCITY RATIO: 0.58
ECHO AV VTI: 18.5 CM
ECHO BSA: 2.05 M2
ECHO BSA: 2.05 M2
ECHO EST RA PRESSURE: 5 MMHG
ECHO LA DIAMETER INDEX: 3.55 CM/M2
ECHO LA DIAMETER: 7.2 CM
ECHO LA TO AORTIC ROOT RATIO: 1.71
ECHO LA VOL A-L A2C: 313 ML (ref 18–58)
ECHO LA VOL A-L A4C: 257 ML (ref 18–58)
ECHO LA VOL BP: 274 ML (ref 18–58)
ECHO LA VOL MOD A2C: 303 ML (ref 18–58)
ECHO LA VOL MOD A4C: 233 ML (ref 18–58)
ECHO LA VOL/BSA BIPLANE: 135 ML/M2 (ref 16–34)
ECHO LA VOLUME AREA LENGTH: 296 ML
ECHO LA VOLUME INDEX A-L A2C: 154 ML/M2 (ref 16–34)
ECHO LA VOLUME INDEX A-L A4C: 127 ML/M2 (ref 16–34)
ECHO LA VOLUME INDEX AREA LENGTH: 146 ML/M2 (ref 16–34)
ECHO LA VOLUME INDEX MOD A2C: 149 ML/M2 (ref 16–34)
ECHO LA VOLUME INDEX MOD A4C: 115 ML/M2 (ref 16–34)
ECHO LV EDV A2C: 118 ML
ECHO LV EDV A4C: 137 ML
ECHO LV EDV BP: 129 ML (ref 67–155)
ECHO LV EDV INDEX A4C: 67 ML/M2
ECHO LV EDV INDEX BP: 64 ML/M2
ECHO LV EDV NDEX A2C: 58 ML/M2
ECHO LV EJECTION FRACTION A2C: 56 %
ECHO LV EJECTION FRACTION A4C: 52 %
ECHO LV EJECTION FRACTION BIPLANE: 53 % (ref 55–100)
ECHO LV ESV A2C: 52 ML
ECHO LV ESV A4C: 66 ML
ECHO LV ESV BP: 60 ML (ref 22–58)
ECHO LV ESV INDEX A2C: 26 ML/M2
ECHO LV ESV INDEX A4C: 33 ML/M2
ECHO LV ESV INDEX BP: 30 ML/M2
ECHO LV FRACTIONAL SHORTENING: 44 % (ref 28–44)
ECHO LV INTERNAL DIMENSION DIASTOLE INDEX: 3 CM/M2
ECHO LV INTERNAL DIMENSION DIASTOLIC: 6.1 CM (ref 4.2–5.9)
ECHO LV INTERNAL DIMENSION SYSTOLIC INDEX: 1.67 CM/M2
ECHO LV INTERNAL DIMENSION SYSTOLIC: 3.4 CM
ECHO LV IVSD: 1.3 CM (ref 0.6–1)
ECHO LV MASS 2D: 398.3 G (ref 88–224)
ECHO LV MASS INDEX 2D: 196.2 G/M2 (ref 49–115)
ECHO LV POSTERIOR WALL DIASTOLIC: 1.5 CM (ref 0.6–1)
ECHO LV RELATIVE WALL THICKNESS RATIO: 0.49
ECHO LVOT AREA: 3.1 CM2
ECHO LVOT AV VTI INDEX: 0.59
ECHO LVOT DIAM: 2 CM
ECHO LVOT MEAN GRADIENT: 1 MMHG
ECHO LVOT PEAK GRADIENT: 2 MMHG
ECHO LVOT PEAK VELOCITY: 0.7 M/S
ECHO LVOT STROKE VOLUME INDEX: 16.9 ML/M2
ECHO LVOT SV: 34.2 ML
ECHO LVOT VTI: 10.9 CM
ECHO MV EROA PISA: 0.6 CM2
ECHO MV REGURGITANT ALIASING (NYQUIST) VELOCITY: 37 CM/S
ECHO MV REGURGITANT RADIUS PISA: 1.15 CM
ECHO MV REGURGITANT VELOCITY PISA: 4.9 M/S
ECHO MV REGURGITANT VOLUME PISA: 79.58 ML
ECHO MV REGURGITANT VTIA: 126.9 CM
ECHO PV MAX VELOCITY: 0.6 M/S
ECHO PV PEAK GRADIENT: 1 MMHG
ECHO RIGHT VENTRICULAR SYSTOLIC PRESSURE (RVSP): 51 MMHG
ECHO TV REGURGITANT MAX VELOCITY: 3.4 M/S
ECHO TV REGURGITANT PEAK GRADIENT: 46 MMHG
NUC STRESS EJECTION FRACTION: 56 %
STRESS BASELINE DIAS BP: 70 MMHG
STRESS BASELINE HR: 74 BPM
STRESS BASELINE ST DEPRESSION: 0 MM
STRESS BASELINE SYS BP: 104 MMHG
STRESS ESTIMATED WORKLOAD: 1 METS
STRESS O2 SAT PEAK: 96 %
STRESS O2 SAT REST: 98 %
STRESS PEAK DIAS BP: 70 MMHG
STRESS PEAK SYS BP: 110 MMHG
STRESS PERCENT HR ACHIEVED: 63 %
STRESS POST PEAK HR: 88 BPM
STRESS RATE PRESSURE PRODUCT: 9680 BPM*MMHG
STRESS ST DEPRESSION: 0 MM
STRESS TARGET HR: 140 BPM
TID: 1.11

## 2024-03-07 PROCEDURE — A9500 TC99M SESTAMIBI: HCPCS | Performed by: INTERNAL MEDICINE

## 2024-03-07 PROCEDURE — 93306 TTE W/DOPPLER COMPLETE: CPT | Performed by: INTERNAL MEDICINE

## 2024-03-07 PROCEDURE — 78452 HT MUSCLE IMAGE SPECT MULT: CPT | Performed by: INTERNAL MEDICINE

## 2024-03-07 RX ORDER — REGADENOSON 0.08 MG/ML
0.4 INJECTION, SOLUTION INTRAVENOUS
Status: COMPLETED | OUTPATIENT
Start: 2024-03-07 | End: 2024-03-07

## 2024-03-07 RX ORDER — AMINOPHYLLINE 25 MG/ML
100 INJECTION, SOLUTION INTRAVENOUS
Status: COMPLETED | OUTPATIENT
Start: 2024-03-07 | End: 2024-03-07

## 2024-03-07 RX ORDER — TETRAKIS(2-METHOXYISOBUTYLISOCYANIDE)COPPER(I) TETRAFLUOROBORATE 1 MG/ML
26.2 INJECTION, POWDER, LYOPHILIZED, FOR SOLUTION INTRAVENOUS
Status: COMPLETED | OUTPATIENT
Start: 2024-03-07 | End: 2024-03-07

## 2024-03-07 RX ORDER — TETRAKIS(2-METHOXYISOBUTYLISOCYANIDE)COPPER(I) TETRAFLUOROBORATE 1 MG/ML
7.4 INJECTION, POWDER, LYOPHILIZED, FOR SOLUTION INTRAVENOUS
Status: COMPLETED | OUTPATIENT
Start: 2024-03-07 | End: 2024-03-07

## 2024-03-07 RX ADMIN — TECHNETIUM TC-99M SESTAMIBI 7.4 MILLICURIE: 1 INJECTION INTRAVENOUS at 13:00

## 2024-03-07 RX ADMIN — REGADENOSON 0.4 MG: 0.08 INJECTION, SOLUTION INTRAVENOUS at 14:40

## 2024-03-07 RX ADMIN — AMINOPHYLLINE 100 MG: 25 INJECTION, SOLUTION INTRAVENOUS at 14:45

## 2024-03-07 RX ADMIN — TECHNETIUM TC-99M SESTAMIBI 26.2 MILLICURIE: 1 INJECTION INTRAVENOUS at 14:35

## 2024-03-07 NOTE — TELEPHONE ENCOUNTER
----- Message from José Antonio Rico MD sent at 3/7/2024  4:08 PM EST -----  Please let pt know stress test was normal. thx

## 2024-03-07 NOTE — TELEPHONE ENCOUNTER
----- Message from José Antonio Rico MD sent at 3/7/2024  3:21 PM EST -----  Agree. I think he ruptured a chord and has a flail leaflet. I just spoke with Alexnadra as I couldn't reach the pt by phone. We're working on either he needs to be admitted or see [facility]/valve clinic ASAP. [facility] for the heads up.  ----- Message -----  From: Destiny Lopez RD  Sent: 3/7/2024   2:18 PM EST  To: José Antonio Rico MD; Alexandra Mccormack RN    This patient has A LOT of TR and MR, possible MV veg vs chordae or tissue. Severely dilated LA/RA. Gaudencio said he was fine for Airam.

## 2024-03-07 NOTE — TELEPHONE ENCOUNTER
The patient was currently getting a stress test. I gave the patient his results of his echo. Let him know if his SOB gets worse and he starts gaining weight he needs to go to the ER. He said he was fine. He asked me to discuss it with his wife in the waiting room. She was pulled back into a room and understood everything. She is a retired nurse. She asked if she could talk to Dr. Rico about it. Dr. Rico said he could do a virtual visit in the morning. I will set up the virtual visit for tomorrow morning at 9 am with Dr. Rico. The patient is on the schedule to talk to Dr. Allen next Wednesday.

## 2024-03-07 NOTE — TELEPHONE ENCOUNTER
Attempted to reach patient by telephone. Left a messsage with results. Dr. Rico will go over results tomorrow.

## 2024-03-08 ENCOUNTER — TELEMEDICINE (OUTPATIENT)
Age: 79
End: 2024-03-08
Payer: MEDICARE

## 2024-03-08 DIAGNOSIS — Z79.01 CHRONIC ANTICOAGULATION: ICD-10-CM

## 2024-03-08 DIAGNOSIS — I34.0 SEVERE MITRAL REGURGITATION: Primary | ICD-10-CM

## 2024-03-08 DIAGNOSIS — I48.20 CHRONIC A-FIB (HCC): ICD-10-CM

## 2024-03-08 DIAGNOSIS — I10 BENIGN ESSENTIAL HTN: ICD-10-CM

## 2024-03-08 PROCEDURE — 99214 OFFICE O/P EST MOD 30 MIN: CPT | Performed by: INTERNAL MEDICINE

## 2024-03-08 PROCEDURE — G8428 CUR MEDS NOT DOCUMENT: HCPCS | Performed by: INTERNAL MEDICINE

## 2024-03-08 PROCEDURE — 1123F ACP DISCUSS/DSCN MKR DOCD: CPT | Performed by: INTERNAL MEDICINE

## 2024-03-08 RX ORDER — TRIAMCINOLONE ACETONIDE 1 MG/G
CREAM TOPICAL AS NEEDED
COMMUNITY
Start: 2024-03-06

## 2024-03-08 RX ORDER — FUROSEMIDE 40 MG/1
40 TABLET ORAL DAILY
COMMUNITY
Start: 2024-02-27

## 2024-03-08 NOTE — PROGRESS NOTES
VIRTUAL VISIT DOCUMENTATION     Pursuant to the emergency declaration under the [place] Act and the National Emergencies Act, 1135 waiver authority and the Coronavirus Preparedness and Response Supplemental Appropriations Act, this Virtual  Visit was conducted, with patient's consent, to reduce the patient's risk of exposure to COVID-19 and provide continuity of care for an established patient.     Services were provided through a video synchronous discussion virtually to substitute for in-person clinic visit.    CHIEF COMPLAINT      Brent Sabillon Jr. is a 79 y.o. male who was seen by synchronous (real-time) audio-video technology on 3/8/2024.      HISTORY OF PRESENTING ILLNESS      Mr. 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    Over the last few months he had been having hospitalizations for various respiratory issues including pneumonia, hypertension.  For his shortness of breath proceeded with a stress test and echocardiogram.  His stress test was normal with no ischemia.  However his echocardiogram showed what appears to be a ruptured cord and partial flail of the posterior leaflet and severe mitral regurgitation with anterior directed jet.  From a symptom standpoint overall he has some shortness of breath, but says this has been stable. He has been in rate controlled atrial fibrillation.  Soc hx. Quit tobacco many yrs ago  Fam hx. No early CAD  Assessment and Plan:  1.  Severe mitral regurgitation.  Attempted to contact him about the results of his echo that was done yesterday but he was doing his stress test at the time.  Scheduled this virtual visit today to discuss the results of his echo and stress test and we went over them in detail.  I suspect that he will need to have his valve repaired within the next few months.  We do have him scheduled for an appointment with our valve specialist Dr. Allen in a few days to review the

## 2024-03-13 ENCOUNTER — TELEPHONE (OUTPATIENT)
Age: 79
End: 2024-03-13

## 2024-03-13 ENCOUNTER — OFFICE VISIT (OUTPATIENT)
Age: 79
End: 2024-03-13
Payer: MEDICARE

## 2024-03-13 VITALS
SYSTOLIC BLOOD PRESSURE: 90 MMHG | RESPIRATION RATE: 18 BRPM | HEART RATE: 66 BPM | OXYGEN SATURATION: 98 % | HEIGHT: 70 IN | WEIGHT: 186 LBS | DIASTOLIC BLOOD PRESSURE: 62 MMHG | BODY MASS INDEX: 26.63 KG/M2

## 2024-03-13 DIAGNOSIS — I34.0 SEVERE MITRAL REGURGITATION: ICD-10-CM

## 2024-03-13 DIAGNOSIS — I48.20 CHRONIC A-FIB (HCC): ICD-10-CM

## 2024-03-13 DIAGNOSIS — I34.0 SEVERE MITRAL REGURGITATION: Primary | ICD-10-CM

## 2024-03-13 DIAGNOSIS — I20.0 UNSTABLE ANGINA (HCC): Primary | ICD-10-CM

## 2024-03-13 DIAGNOSIS — I50.32 CHRONIC HEART FAILURE WITH PRESERVED EJECTION FRACTION (HCC): ICD-10-CM

## 2024-03-13 LAB
ANION GAP SERPL CALC-SCNC: 5 MMOL/L (ref 5–15)
BASOPHILS # BLD: 0 K/UL (ref 0–0.1)
BASOPHILS NFR BLD: 0 % (ref 0–1)
BUN SERPL-MCNC: 21 MG/DL (ref 6–20)
BUN/CREAT SERPL: 10 (ref 12–20)
CALCIUM SERPL-MCNC: 9.2 MG/DL (ref 8.5–10.1)
CHLORIDE SERPL-SCNC: 110 MMOL/L (ref 97–108)
CO2 SERPL-SCNC: 27 MMOL/L (ref 21–32)
CREAT SERPL-MCNC: 2.03 MG/DL (ref 0.7–1.3)
DIFFERENTIAL METHOD BLD: ABNORMAL
EOSINOPHIL # BLD: 0.2 K/UL (ref 0–0.4)
EOSINOPHIL NFR BLD: 3 % (ref 0–7)
ERYTHROCYTE [DISTWIDTH] IN BLOOD BY AUTOMATED COUNT: 17 % (ref 11.5–14.5)
GLUCOSE SERPL-MCNC: 116 MG/DL (ref 65–100)
HCT VFR BLD AUTO: 42.1 % (ref 36.6–50.3)
HGB BLD-MCNC: 13.7 G/DL (ref 12.1–17)
IMM GRANULOCYTES # BLD AUTO: 0 K/UL (ref 0–0.04)
IMM GRANULOCYTES NFR BLD AUTO: 0 % (ref 0–0.5)
LYMPHOCYTES # BLD: 1.2 K/UL (ref 0.8–3.5)
LYMPHOCYTES NFR BLD: 22 % (ref 12–49)
MCH RBC QN AUTO: 28.8 PG (ref 26–34)
MCHC RBC AUTO-ENTMCNC: 32.5 G/DL (ref 30–36.5)
MCV RBC AUTO: 88.4 FL (ref 80–99)
MONOCYTES # BLD: 0.5 K/UL (ref 0–1)
MONOCYTES NFR BLD: 10 % (ref 5–13)
NEUTS SEG # BLD: 3.3 K/UL (ref 1.8–8)
NEUTS SEG NFR BLD: 65 % (ref 32–75)
NRBC # BLD: 0 K/UL (ref 0–0.01)
NRBC BLD-RTO: 0 PER 100 WBC
PLATELET # BLD AUTO: 160 K/UL (ref 150–400)
PMV BLD AUTO: 12.4 FL (ref 8.9–12.9)
POTASSIUM SERPL-SCNC: 3.8 MMOL/L (ref 3.5–5.1)
RBC # BLD AUTO: 4.76 M/UL (ref 4.1–5.7)
SODIUM SERPL-SCNC: 142 MMOL/L (ref 136–145)
WBC # BLD AUTO: 5.2 K/UL (ref 4.1–11.1)

## 2024-03-13 PROCEDURE — 99205 OFFICE O/P NEW HI 60 MIN: CPT | Performed by: INTERNAL MEDICINE

## 2024-03-13 RX ORDER — CARVEDILOL 25 MG/1
25 TABLET ORAL
COMMUNITY
Start: 2024-03-08

## 2024-03-13 ASSESSMENT — PATIENT HEALTH QUESTIONNAIRE - PHQ9
SUM OF ALL RESPONSES TO PHQ QUESTIONS 1-9: 0
2. FEELING DOWN, DEPRESSED OR HOPELESS: 0
1. LITTLE INTEREST OR PLEASURE IN DOING THINGS: 0
SUM OF ALL RESPONSES TO PHQ9 QUESTIONS 1 & 2: 0
SUM OF ALL RESPONSES TO PHQ QUESTIONS 1-9: 0

## 2024-03-14 PROBLEM — I34.0 SEVERE MITRAL REGURGITATION: Status: ACTIVE | Noted: 2024-03-13

## 2024-03-14 PROBLEM — I20.0 UNSTABLE ANGINA (HCC): Status: ACTIVE | Noted: 2024-03-13

## 2024-03-14 NOTE — TELEPHONE ENCOUNTER
Left message for patient to return call to schedule procedure.    ----- Message from Jeannette Stallworth RN sent at 3/13/2024 12:06 PM EDT -----  Dr. Allen would like pt to have L/RHC and MARTIN asap for severe mitral regurgitation:    CPT: 78853 and 77272    ICD: I34.0 and I20.0    BMP and CBC should be done today    Meds: no xarelto 2 days before    I figured you were at lunch so said you'd give him a call; thank you:)    
48 hours  No baths, swimming, hot tubs, or spas for a week  The band aid over the cath site may be removed the day after procedure and washed gently with soap and water.  The site may be bruised or discolored for a couple of weeks; a small knot may also be present.  You may have tenderness/soreness in groin or wrist area, you may take Tylenol for relief.    When to call the office  You notice any tingling, numbness, coldness, or loss of feeling, or you have a fever for 2-3 days after the procedure, if there is visible blood at the site, hold pressure for 20 minutes and then call.    Contact  [id number] [place] [address]                                                        Cath lab: [phone]                                                    Prashanth office: [phone]

## 2024-03-20 ENCOUNTER — PREP FOR PROCEDURE (OUTPATIENT)
Age: 79
End: 2024-03-20

## 2024-03-20 RX ORDER — SODIUM CHLORIDE 0.9 % (FLUSH) 0.9 %
5-40 SYRINGE (ML) INJECTION EVERY 12 HOURS SCHEDULED
Status: CANCELLED | OUTPATIENT
Start: 2024-03-20

## 2024-03-25 ENCOUNTER — ANESTHESIA EVENT (OUTPATIENT)
Facility: HOSPITAL | Age: 79
End: 2024-03-25
Payer: MEDICARE

## 2024-03-25 ENCOUNTER — HOSPITAL ENCOUNTER (OUTPATIENT)
Facility: HOSPITAL | Age: 79
Discharge: HOME | End: 2024-03-25
Attending: INTERNAL MEDICINE | Admitting: INTERNAL MEDICINE
Payer: MEDICARE

## 2024-03-25 ENCOUNTER — ANESTHESIA (OUTPATIENT)
Facility: HOSPITAL | Age: 79
End: 2024-03-25
Payer: MEDICARE

## 2024-03-25 ENCOUNTER — HOSPITAL ENCOUNTER (OUTPATIENT)
Facility: HOSPITAL | Age: 79
End: 2024-03-25
Attending: INTERNAL MEDICINE
Payer: MEDICARE

## 2024-03-25 VITALS
HEART RATE: 95 BPM | DIASTOLIC BLOOD PRESSURE: 65 MMHG | OXYGEN SATURATION: 93 % | RESPIRATION RATE: 22 BRPM | SYSTOLIC BLOOD PRESSURE: 104 MMHG

## 2024-03-25 VITALS
TEMPERATURE: 98.7 F | HEART RATE: 89 BPM | OXYGEN SATURATION: 97 % | HEIGHT: 70 IN | BODY MASS INDEX: 26.92 KG/M2 | SYSTOLIC BLOOD PRESSURE: 126 MMHG | RESPIRATION RATE: 23 BRPM | DIASTOLIC BLOOD PRESSURE: 67 MMHG | WEIGHT: 188 LBS

## 2024-03-25 DIAGNOSIS — I34.0 SEVERE MITRAL REGURGITATION: ICD-10-CM

## 2024-03-25 DIAGNOSIS — I20.0 UNSTABLE ANGINA (HCC): ICD-10-CM

## 2024-03-25 DIAGNOSIS — I48.20 CHRONIC A-FIB (HCC): ICD-10-CM

## 2024-03-25 LAB — ECHO BSA: 2.05 M2

## 2024-03-25 PROCEDURE — 2709999900 HC NON-CHARGEABLE SUPPLY

## 2024-03-25 PROCEDURE — 2709999900 HC NON-CHARGEABLE SUPPLY: Performed by: INTERNAL MEDICINE

## 2024-03-25 PROCEDURE — 7100000010 HC PHASE II RECOVERY - FIRST 15 MIN

## 2024-03-25 PROCEDURE — 93460 R&L HRT ART/VENTRICLE ANGIO: CPT | Performed by: INTERNAL MEDICINE

## 2024-03-25 PROCEDURE — 93319 3D ECHO IMG CGEN CAR ANOMAL: CPT

## 2024-03-25 PROCEDURE — C1725 CATH, TRANSLUMIN NON-LASER: HCPCS | Performed by: INTERNAL MEDICINE

## 2024-03-25 PROCEDURE — 3700000001 HC ADD 15 MINUTES (ANESTHESIA): Performed by: INTERNAL MEDICINE

## 2024-03-25 PROCEDURE — 2500000003 HC RX 250 WO HCPCS: Performed by: NURSE ANESTHETIST, CERTIFIED REGISTERED

## 2024-03-25 PROCEDURE — 3700000000 HC ANESTHESIA ATTENDED CARE: Performed by: INTERNAL MEDICINE

## 2024-03-25 PROCEDURE — 7100000011 HC PHASE II RECOVERY - ADDTL 15 MIN

## 2024-03-25 PROCEDURE — 2500000003 HC RX 250 WO HCPCS: Performed by: INTERNAL MEDICINE

## 2024-03-25 PROCEDURE — 93312 ECHO TRANSESOPHAGEAL: CPT

## 2024-03-25 PROCEDURE — 2580000003 HC RX 258: Performed by: NURSE ANESTHETIST, CERTIFIED REGISTERED

## 2024-03-25 PROCEDURE — C1894 INTRO/SHEATH, NON-LASER: HCPCS | Performed by: INTERNAL MEDICINE

## 2024-03-25 PROCEDURE — 76937 US GUIDE VASCULAR ACCESS: CPT | Performed by: INTERNAL MEDICINE

## 2024-03-25 PROCEDURE — 6360000004 HC RX CONTRAST MEDICATION: Performed by: INTERNAL MEDICINE

## 2024-03-25 PROCEDURE — 99152 MOD SED SAME PHYS/QHP 5/>YRS: CPT | Performed by: INTERNAL MEDICINE

## 2024-03-25 PROCEDURE — C1713 ANCHOR/SCREW BN/BN,TIS/BN: HCPCS | Performed by: INTERNAL MEDICINE

## 2024-03-25 PROCEDURE — 6360000002 HC RX W HCPCS: Performed by: NURSE ANESTHETIST, CERTIFIED REGISTERED

## 2024-03-25 PROCEDURE — 6360000002 HC RX W HCPCS: Performed by: INTERNAL MEDICINE

## 2024-03-25 RX ORDER — SODIUM CHLORIDE 9 MG/ML
INJECTION, SOLUTION INTRAVENOUS CONTINUOUS
Status: DISCONTINUED | OUTPATIENT
Start: 2024-03-25 | End: 2024-03-25 | Stop reason: HOSPADM

## 2024-03-25 RX ORDER — LIDOCAINE HYDROCHLORIDE 20 MG/ML
INJECTION, SOLUTION EPIDURAL; INFILTRATION; INTRACAUDAL; PERINEURAL PRN
Status: DISCONTINUED | OUTPATIENT
Start: 2024-03-25 | End: 2024-03-25 | Stop reason: SDUPTHER

## 2024-03-25 RX ORDER — SODIUM CHLORIDE 9 MG/ML
INJECTION, SOLUTION INTRAVENOUS PRN
Status: DISCONTINUED | OUTPATIENT
Start: 2024-03-25 | End: 2024-03-25 | Stop reason: HOSPADM

## 2024-03-25 RX ORDER — PROPOFOL 10 MG/ML
INJECTION, EMULSION INTRAVENOUS PRN
Status: DISCONTINUED | OUTPATIENT
Start: 2024-03-25 | End: 2024-03-25 | Stop reason: SDUPTHER

## 2024-03-25 RX ORDER — PHENYLEPHRINE HCL IN 0.9% NACL 0.4MG/10ML
SYRINGE (ML) INTRAVENOUS PRN
Status: DISCONTINUED | OUTPATIENT
Start: 2024-03-25 | End: 2024-03-25 | Stop reason: SDUPTHER

## 2024-03-25 RX ORDER — SODIUM CHLORIDE 0.9 % (FLUSH) 0.9 %
5-40 SYRINGE (ML) INJECTION EVERY 12 HOURS SCHEDULED
Status: DISCONTINUED | OUTPATIENT
Start: 2024-03-25 | End: 2024-03-25 | Stop reason: HOSPADM

## 2024-03-25 RX ORDER — SODIUM CHLORIDE 9 MG/ML
INJECTION, SOLUTION INTRAVENOUS CONTINUOUS PRN
Status: DISCONTINUED | OUTPATIENT
Start: 2024-03-25 | End: 2024-03-25 | Stop reason: SDUPTHER

## 2024-03-25 RX ORDER — ACETAMINOPHEN 325 MG/1
650 TABLET ORAL EVERY 4 HOURS PRN
Status: DISCONTINUED | OUTPATIENT
Start: 2024-03-25 | End: 2024-03-25 | Stop reason: HOSPADM

## 2024-03-25 RX ORDER — SODIUM CHLORIDE 0.9 % (FLUSH) 0.9 %
5-40 SYRINGE (ML) INJECTION PRN
Status: DISCONTINUED | OUTPATIENT
Start: 2024-03-25 | End: 2024-03-25 | Stop reason: HOSPADM

## 2024-03-25 RX ORDER — HEPARIN SODIUM 1000 [USP'U]/ML
INJECTION, SOLUTION INTRAVENOUS; SUBCUTANEOUS PRN
Status: DISCONTINUED | OUTPATIENT
Start: 2024-03-25 | End: 2024-03-25 | Stop reason: HOSPADM

## 2024-03-25 RX ORDER — LIDOCAINE HYDROCHLORIDE 10 MG/ML
INJECTION, SOLUTION INFILTRATION; PERINEURAL PRN
Status: DISCONTINUED | OUTPATIENT
Start: 2024-03-25 | End: 2024-03-25 | Stop reason: HOSPADM

## 2024-03-25 RX ADMIN — PROPOFOL 75 MG: 10 INJECTION, EMULSION INTRAVENOUS at 09:54

## 2024-03-25 RX ADMIN — Medication 160 MCG: at 10:08

## 2024-03-25 RX ADMIN — PROPOFOL 25 MG: 10 INJECTION, EMULSION INTRAVENOUS at 09:59

## 2024-03-25 RX ADMIN — PROPOFOL 50 MG: 10 INJECTION, EMULSION INTRAVENOUS at 09:56

## 2024-03-25 RX ADMIN — PROPOFOL 25 MG: 10 INJECTION, EMULSION INTRAVENOUS at 10:03

## 2024-03-25 RX ADMIN — PROPOFOL 25 MG: 10 INJECTION, EMULSION INTRAVENOUS at 10:05

## 2024-03-25 RX ADMIN — LIDOCAINE HYDROCHLORIDE 80 MG: 20 INJECTION, SOLUTION EPIDURAL; INFILTRATION; INTRACAUDAL; PERINEURAL at 09:54

## 2024-03-25 RX ADMIN — PROPOFOL 25 MG: 10 INJECTION, EMULSION INTRAVENOUS at 09:58

## 2024-03-25 RX ADMIN — SODIUM CHLORIDE: 9 INJECTION, SOLUTION INTRAVENOUS at 09:20

## 2024-03-25 RX ADMIN — Medication 120 MCG: at 10:05

## 2024-03-25 RX ADMIN — PROPOFOL 25 MG: 10 INJECTION, EMULSION INTRAVENOUS at 10:00

## 2024-03-25 NOTE — ANESTHESIA POSTPROCEDURE EVALUATION
Post-Anesthesia Evaluation and Assessment    Patient: Brent Sabillon Jr. MRN: [id number]  SSN: [id number]    YOB: 1945  Age: 79 y.o.  Sex: male      I have evaluated the patient and they are stable and ready for discharge from the PACU.     Cardiovascular Function/Vital Signs  Visit Vitals  BP (!) 96/55   Pulse 84   Temp 97.9 °F (36.6 °C) (Oral)   Resp 22   Ht 1.778 m (5' 10\")   Wt 85.3 kg (188 lb)   SpO2 95%   BMI 26.98 kg/m²       Patient is status post IV Sedation anesthesia for Procedure(s):  Left and right heart cath / coronary angiography  Rodri during cath case.    Hand over given to the cath lab team who will continue conscious sedation for the cath lab portion of the case and discharge.    Izaiah Bautista MD

## 2024-03-25 NOTE — PROGRESS NOTES
CATH LAB to RECOVERY ROOM REPORT    Procedure: C/C    MD: SILVIO Allen MD    The procedure was diagnostic only.    Verbal Report given to Recovery Nurse on patient being transferred to Cardiac Cath Lab  for routine post-op. Patient stable upon transfer to .  Vitals, mental status, MAR, procedural summary discussed with recovery RN.    Medication given during procedure:    Contrast:15mL                          Heparin:4300units     Sheaths:    Right radial sheath pulled at 10:55 am, band at 10 mL of air    Right internal jugular vein sheath pulled at 10:56 am, secured with a band-aid.

## 2024-03-25 NOTE — ANESTHESIA PRE PROCEDURE
Department of Anesthesiology  Preprocedure Note       Name:  Brent Sabillon Jr.   Age:  79 y.o.  :  1945                                          MRN:  [id number]         Date:  3/25/2024      Surgeon: * No surgeons listed *    Procedure: * No procedures listed *    Medications prior to admission:   Prior to Admission medications    Medication Sig Start Date End Date Taking? Authorizing Provider   carvedilol (COREG) 25 MG tablet Take 1 tablet by mouth 2 times daily 3/8/24   Citlaly Dillon MD   furosemide (LASIX) 40 MG tablet Take 1 tablet by mouth daily 24   Citlaly Dillon MD   triamcinolone (KENALOG) 0.1 % cream Apply topically as needed 3/6/24   Provider, Historical, MD   KLOR-CON M20 20 MEQ extended release tablet Take 0.5 tablets by mouth daily 23   Citlaly Dillon MD   rivaroxaban (XARELTO) 15 MG TABS tablet Take 1 tablet by mouth 11/3/23   Citlaly Dillon MD   amLODIPine (NORVASC) 10 MG tablet Take 1 tablet by mouth daily 18   Automatic Reconciliation, Ar   bimatoprost (LUMIGAN) 0.01 % SOLN ophthalmic drops Apply 1 drop to eye    Automatic Reconciliation, Ar   brimonidine (ALPHAGAN P) 0.1 % SOLN Apply 1 drop to eye daily    Automatic Reconciliation, Ar   dorzolamide (TRUSOPT) 2 % ophthalmic solution Apply 1 drop to eye    Automatic Reconciliation, Ar   finasteride (PROSCAR) 5 MG tablet ceived the following from [facility]: Outside name: finasteride (PROSCAR) 5 mg tablet 21   Automatic Reconciliation, Ar   hydrALAZINE (APRESOLINE) 50 MG tablet Take 1 tablet by mouth in the morning and 1 tablet at noon and 1 tablet in the evening. 17   Automatic Reconciliation, Ar   lisinopril (PRINIVIL;ZESTRIL) 40 MG tablet Take 1 tablet by mouth 2 times daily    Automatic Reconciliation, Ar   rosuvastatin (CRESTOR) 20 MG tablet Take 1 tablet by mouth nightly    Automatic Reconciliation, Ar   timolol (TIMOPTIC) 0.5 % ophthalmic solution Apply 1

## 2024-03-25 NOTE — PROGRESS NOTES
Pt's wife Virginia updated on post procedure and discharge status.    Pt sitting up tolerating food tray well; no complaints..

## 2024-03-25 NOTE — PROGRESS NOTES
Dr Allen at Pt's stretcher side discussing procedure results. Dr Allen also spoke with Pt's spouse.

## 2024-03-25 NOTE — PROGRESS NOTES
TRANSFER - IN REPORT:    Verbal report received from Lilly GAGNON on Brent Sabillon JrSara  being received from procedure area for routine progression of patient care. Report consisted of patient’s Situation, Background, Assessment and Recommendations(SBAR). Information from the following report(s)SBAR, Procedure Summary, MAR, Recent Results, and Cardiac Rhythm Afib  was reviewed with the receiving clinician. Opportunity for questions and clarification was provided. Assessment completed upon patient’s arrival to Cardiac Cath Lab RECOVERY AREA and care assumed.       Cardiac Cath Lab Recovery Arrival Note:    Bretn Sabillon Jr. arrived to CCL recovery area.  Patient procedure= MARTIN/RHC/LHC. Patient on cardiac monitor, non-invasive blood pressure, SPO2 monitor. On Room air .  IV  of NS on pump at 50 ml/hr. Patient status doing well without problems. Patient is A&Ox 4. Patient reports No Pain.    PROCEDURE SITE CHECK:    Procedure site:without any bleeding and No Hematoma, No pain/discomfort reported at procedure site.     No change in patient status. Continue to monitor patient and status.

## 2024-03-25 NOTE — DISCHARGE INSTRUCTIONS
Future Appointments   Date Time Provider Department Center   2/11/2025  1:20 PM José Antonio Rico MD [facility]     The Valve team will arrange a follow up appoinment for you to come and discuss next steps for your valve.

## 2024-03-25 NOTE — PROGRESS NOTES
Cardiac Cath Lab Procedure Area Arrival Note:    Brent Sabillon JrSara arrived to Cardiac Cath Lab, Procedure Area. Patient identifiers verified with NAME and DATE OF BIRTH. Procedure verified with patient. Consent forms verified. Allergies verified. Patient informed of procedure and plan of care. Questions answered with review. Patient voiced understanding of procedure and plan of care.    Patient on cardiac monitor, non-invasive blood pressure, SPO2 monitor. On room air. IV of normal saline on pump at 50 ml/hr. Patient status doing well without problems. Patient is A&Ox 4. Patient reports no chest pain, no shortness of breath.     Patient medicated during procedure with orders obtained and verified by Dr. Allen.    Refer to patients Cardiac Cath Lab PROCEDURE REPORT for vital signs, assessment, status, and response during procedure, printed at end of case. Printed report on chart or scanned into chart.

## 2024-03-25 NOTE — PROGRESS NOTES
Ambulated patient to the bathroom with a steady gait, voided without difficulty. Patient denies chest pain, shortness of breath, or dizziness. Returned to stretcher. Vital signs stable.     Procedural site is clean, dry, and intact. No bleeding, no hematoma.     Assisted patient in dressing/Patient dressed self.     Educated patient about their sedation precautions such as not driving, operating any machinery, or signing legal documents 24 hours post procedure.     Reviewed discharge instructions, including medications and site care using the teach back method.  Answered all questions. Verbalized understanding.     Removed peripheral IV.    Escorted to discharge area in a wheelchair with all of their belongings including Wallet and discharge instructions/glasses.    Patient's spouse present to take patient home. Reviewed discharge instructions with patients' spouse, they verbalized understanding.

## 2024-03-25 NOTE — PROGRESS NOTES
Cardiac Cath Lab Recovery Arrival Note:      Brent Sabillon Jr. arrived to Cardiac Cath Lab, Recovery Area. Staff introduced to patient. Patient identifiers verified with NAME and DATE OF BIRTH. Procedure verified with patient. Consent forms reviewed and signed by patient or authorized representative and verified. Allergies verified.     Patient and family oriented to department. Patient and family informed of procedure and plan of care.     Questions answered with review. Patient prepped for procedure, per orders from physician, prior to arrival.    Patient on cardiac monitor, non-invasive blood pressure, SPO2 monitor. On RA. Patient is A&Ox 4. Patient reports no complaints.     Patient in stretcher, in low position, with side rails up, call bell within reach, patient instructed to call if assistance as needed.    Patient prep in: Fast Track 4.   Patient family has pager #   Family in: .   Prep by: Virginia, wife [phone]

## 2024-03-27 LAB
ECHO BSA: 2.05 M2
ECHO MV EROA PISA: 0.6 CM2
ECHO MV REGURGITANT ALIASING (NYQUIST) VELOCITY: 34 CM/S
ECHO MV REGURGITANT RADIUS PISA: 1.08 CM
ECHO MV REGURGITANT VELOCITY PISA: 4 M/S
ECHO MV REGURGITANT VOLUME PISA: 80.57 ML
ECHO MV REGURGITANT VTIA: 129.4 CM

## 2024-03-27 PROCEDURE — 93320 DOPPLER ECHO COMPLETE: CPT | Performed by: INTERNAL MEDICINE

## 2024-03-29 ENCOUNTER — TELEPHONE (OUTPATIENT)
Age: 79
End: 2024-03-29

## 2024-04-02 ENCOUNTER — TELEPHONE (OUTPATIENT)
Age: 79
End: 2024-04-02

## 2024-04-04 ENCOUNTER — PREP FOR PROCEDURE (OUTPATIENT)
Age: 79
End: 2024-04-04

## 2024-04-04 ENCOUNTER — INITIAL CONSULT (OUTPATIENT)
Age: 79
End: 2024-04-04
Payer: MEDICARE

## 2024-04-04 VITALS
OXYGEN SATURATION: 97 % | HEART RATE: 67 BPM | RESPIRATION RATE: 18 BRPM | SYSTOLIC BLOOD PRESSURE: 120 MMHG | DIASTOLIC BLOOD PRESSURE: 86 MMHG

## 2024-04-04 DIAGNOSIS — I34.0 SEVERE MITRAL REGURGITATION: Primary | ICD-10-CM

## 2024-04-04 DIAGNOSIS — I10 ESSENTIAL HYPERTENSION: ICD-10-CM

## 2024-04-04 DIAGNOSIS — I50.33 ACUTE ON CHRONIC HEART FAILURE WITH PRESERVED EJECTION FRACTION (HCC): ICD-10-CM

## 2024-04-04 PROCEDURE — 1036F TOBACCO NON-USER: CPT | Performed by: THORACIC SURGERY (CARDIOTHORACIC VASCULAR SURGERY)

## 2024-04-04 PROCEDURE — G8419 CALC BMI OUT NRM PARAM NOF/U: HCPCS | Performed by: NURSE PRACTITIONER

## 2024-04-04 PROCEDURE — 99215 OFFICE O/P EST HI 40 MIN: CPT | Performed by: INTERNAL MEDICINE

## 2024-04-04 PROCEDURE — G8428 CUR MEDS NOT DOCUMENT: HCPCS | Performed by: INTERNAL MEDICINE

## 2024-04-04 PROCEDURE — 99204 OFFICE O/P NEW MOD 45 MIN: CPT | Performed by: NURSE PRACTITIONER

## 2024-04-04 PROCEDURE — 3079F DIAST BP 80-89 MM HG: CPT | Performed by: THORACIC SURGERY (CARDIOTHORACIC VASCULAR SURGERY)

## 2024-04-04 PROCEDURE — 1123F ACP DISCUSS/DSCN MKR DOCD: CPT | Performed by: INTERNAL MEDICINE

## 2024-04-04 PROCEDURE — 1036F TOBACCO NON-USER: CPT | Performed by: INTERNAL MEDICINE

## 2024-04-04 PROCEDURE — G8427 DOCREV CUR MEDS BY ELIG CLIN: HCPCS | Performed by: NURSE PRACTITIONER

## 2024-04-04 PROCEDURE — G8419 CALC BMI OUT NRM PARAM NOF/U: HCPCS | Performed by: INTERNAL MEDICINE

## 2024-04-04 PROCEDURE — 1123F ACP DISCUSS/DSCN MKR DOCD: CPT | Performed by: NURSE PRACTITIONER

## 2024-04-04 PROCEDURE — 3074F SYST BP LT 130 MM HG: CPT | Performed by: NURSE PRACTITIONER

## 2024-04-04 RX ORDER — SODIUM CHLORIDE 0.9 % (FLUSH) 0.9 %
5-40 SYRINGE (ML) INJECTION PRN
Status: CANCELLED | OUTPATIENT
Start: 2024-04-04

## 2024-04-04 RX ORDER — DOXAZOSIN MESYLATE 4 MG/1
4 TABLET ORAL DAILY
Qty: 30 TABLET | Refills: 3 | Status: SHIPPED | OUTPATIENT
Start: 2024-04-04

## 2024-04-04 RX ORDER — FUROSEMIDE 40 MG/1
80 TABLET ORAL DAILY
Qty: 60 TABLET | Refills: 2 | Status: SHIPPED | OUTPATIENT
Start: 2024-04-04 | End: 2024-07-03

## 2024-04-04 RX ORDER — SODIUM CHLORIDE 0.9 % (FLUSH) 0.9 %
5-40 SYRINGE (ML) INJECTION EVERY 12 HOURS SCHEDULED
Status: CANCELLED | OUTPATIENT
Start: 2024-04-04

## 2024-04-04 RX ORDER — SODIUM CHLORIDE 9 MG/ML
INJECTION, SOLUTION INTRAVENOUS PRN
Status: CANCELLED | OUTPATIENT
Start: 2024-04-04

## 2024-04-04 NOTE — PROGRESS NOTES
Patient: Brent Sabillon Jr.   Age: 79 y.o.     Patient Care Team:  Yasmine Mascorro MD as PCP - General  [facility]ascorro [facility]  Kali Post MD as Consulting Physician (Cardiothoracic Surgery)  Radha Allen MD as Consulting Physician (Cardiology)    PCP: Yasmine Mascorro MD    Cardiologist: Dr. Allen    Diagnosis/Reason for Consultation: The encounter diagnosis was Severe mitral regurgitation.    Problem List:   Patient Active Problem List   Diagnosis    Chronic a-fib (HCC)    Chronic anticoagulation    History of tobacco use    Malignant hypertension    CKD (chronic kidney disease)    HTN (hypertension), malignant    Unstable angina (HCC)    Severe mitral regurgitation         HPI: 79 y.o. male with PMHx of Mitral Regurgitation, HTN, CKD, Chronic Afib, that is [facility] by Dr. Allen for interventional evaluation of  Mitral Regurgitation.    He notes shortness of breath mostly when he is in bed. He has orthopnea and notes episodes of PND.  He could probably walk 1/2 block before he would need to stop and rest. He feels fatigued daily.  He has mild LE edema. He was admitted to the hospital with HF symptoms in October and December of 2023.  He denies palpitations, CP, dizziness, syncope, fall, medication changes in the last 3 months, GIB or Hospitalization in the last 2 weeks.      He is  and his wife can help following a surgery. He is completely independent in his ADLs. He does not smoke, drink alcohol, or use recreational drugs.     SOB/DEL RIO: Yes   Fatigue: Yes   Palpitations: No:    Chest pain: No:    Chest tightness with activity: No:    Lightheadedness: No:    Syncope: No:    Falls: No:    Orthopnea: Yes   PND: Yes   LE edema: Yes   Medication changes in past 3 months: No:    Blood/Blackness/Tarriness of stools: No:    Heart Failure Admission w/in past year:  Yes October 2023 and December 2023  Heart Failure Admission

## 2024-04-05 ENCOUNTER — PREP FOR PROCEDURE (OUTPATIENT)
Age: 79
End: 2024-04-05

## 2024-04-05 PROBLEM — I50.33 ACUTE ON CHRONIC HEART FAILURE WITH PRESERVED EJECTION FRACTION (HCC): Status: ACTIVE | Noted: 2024-04-05

## 2024-04-05 PROBLEM — I34.0 MITRAL REGURGITATION: Status: ACTIVE | Noted: 2024-04-05

## 2024-04-05 NOTE — PROGRESS NOTES
Conclusion: There is no evidence of transient ischemic dilation (TID). TID ratio is 1.11.    Image quality is good.    ECG: Resting ECG demonstrates normal sinus rhythm.    ECG: The ECG was negative for ischemia.    Stress Test: A pharmacological stress test was performed using lexiscan. 100 mg of aminophylline given as a reversal agent. Hemodynamics are adequate for diagnosis. Blood pressure demonstrated a normal response and heart rate demonstrated a normal response to stress. The patient's heart rate recovery was normal.    Signed by: José Antonio Rico MD on 3/7/2024  4:07 PM      No results found for this or any previous visit.     Patient Active Problem List   Diagnosis    Chronic a-fib (HCC)    Chronic anticoagulation    History of tobacco use    Malignant hypertension    CKD (chronic kidney disease)    HTN (hypertension), malignant    Unstable angina (HCC)    Severe mitral regurgitation    Mitral regurgitation       No Known Allergies    Current Outpatient Medications on File Prior to Visit   Medication Sig Dispense Refill    furosemide (LASIX) 40 MG tablet Take 2 tablets by mouth daily 60 tablet 2    doxazosin (CARDURA) 4 MG tablet Take 1 tablet by mouth daily 30 tablet 3    carvedilol (COREG) 25 MG tablet Take 1 tablet by mouth 2 times daily      KLOR-CON M20 20 MEQ extended release tablet Take 1 tablet by mouth daily      rivaroxaban (XARELTO) 15 MG TABS tablet Take 1 tablet by mouth      amLODIPine (NORVASC) 10 MG tablet Take 1 tablet by mouth daily      bimatoprost (LUMIGAN) 0.01 % SOLN ophthalmic drops Apply 1 drop to eye      brimonidine (ALPHAGAN P) 0.1 % SOLN Apply 1 drop to eye daily      dorzolamide (TRUSOPT) 2 % ophthalmic solution Apply 1 drop to eye      finasteride (PROSCAR) 5 MG tablet ceived the following from [facility]: Outside name: finasteride (PROSCAR) 5 mg tablet      hydrALAZINE (APRESOLINE) 50 MG tablet Take 1 tablet by mouth in the morning and 1 tablet at noon and 1

## 2024-04-08 ENCOUNTER — TELEPHONE (OUTPATIENT)
Age: 79
End: 2024-04-08

## 2024-04-08 NOTE — TELEPHONE ENCOUNTER
Spoke to Mr. Sabillon regarding his procedure on May 9th, and PAT on 05/03.  Mr. Sabillon had questions regarding his new medications and his breathing. He endorsed having a \"rough night\" where he had to get out of bed and sit up to breathe better. Dr. Allen increased his furosemide and added an addition medication at the end of last week, which Mr. Sabillon will start taking. I advised that he should take the new medications, but if he felt that his symptoms were getting worse and he felt that he could not breath, he should go to the emergency department. He was not in any distress on the phone and was able to carry on conversation normally. He said he would let us know if the medications helped.

## 2024-04-10 ENCOUNTER — TELEPHONE (OUTPATIENT)
Age: 79
End: 2024-04-10

## 2024-04-10 NOTE — TELEPHONE ENCOUNTER
The patient's wife called concerned that the patient is having episodes where he is getting very short of breath. Spoke to the patient who reported that they started happening at night, they last for less than 10 minutes and if he turns onto his side it gets better. An episode happened today while he was sitting up in his recliner, became short of breath for less than 10 minutes. This episode prompted his wife to call. They feel that this is correlated to the two new prescriptions recently started, furosemide and doxazosin.    The patient was not short of breath when speaking with me on the phone, and could converse without issue, but I advised that he should go to the emergency department if he continued to be short of breath.  We discussed coming to the [facility] emergency department. I reached out to RHODA Escobedo who will speak to Dr. Allen for any changes or recommendations for care.

## 2024-04-11 ENCOUNTER — HOSPITAL ENCOUNTER (EMERGENCY)
Facility: HOSPITAL | Age: 79
Discharge: HOME | End: 2024-04-11
Attending: EMERGENCY MEDICINE
Payer: MEDICARE

## 2024-04-11 ENCOUNTER — APPOINTMENT (OUTPATIENT)
Facility: HOSPITAL | Age: 79
End: 2024-04-11
Payer: MEDICARE

## 2024-04-11 VITALS
HEART RATE: 77 BPM | WEIGHT: 190.26 LBS | DIASTOLIC BLOOD PRESSURE: 89 MMHG | SYSTOLIC BLOOD PRESSURE: 121 MMHG | RESPIRATION RATE: 21 BRPM | OXYGEN SATURATION: 95 % | HEIGHT: 70 IN | TEMPERATURE: 97.7 F | BODY MASS INDEX: 27.24 KG/M2

## 2024-04-11 DIAGNOSIS — I48.20 CHRONIC ATRIAL FIBRILLATION (HCC): ICD-10-CM

## 2024-04-11 DIAGNOSIS — I50.9 ACUTE CONGESTIVE HEART FAILURE, UNSPECIFIED HEART FAILURE TYPE (HCC): Primary | ICD-10-CM

## 2024-04-11 DIAGNOSIS — N18.9 CHRONIC KIDNEY DISEASE, UNSPECIFIED CKD STAGE: ICD-10-CM

## 2024-04-11 LAB
ALBUMIN SERPL-MCNC: 3 G/DL (ref 3.5–5)
ALBUMIN/GLOB SERPL: 1 (ref 1.1–2.2)
ALP SERPL-CCNC: 37 U/L (ref 45–117)
ALT SERPL-CCNC: 38 U/L (ref 12–78)
ANION GAP SERPL CALC-SCNC: 5 MMOL/L (ref 5–15)
AST SERPL-CCNC: 33 U/L (ref 15–37)
BASOPHILS # BLD: 0 K/UL (ref 0–0.1)
BASOPHILS NFR BLD: 0 % (ref 0–1)
BILIRUB SERPL-MCNC: 1.4 MG/DL (ref 0.2–1)
BUN SERPL-MCNC: 25 MG/DL (ref 6–20)
BUN/CREAT SERPL: 13 (ref 12–20)
CALCIUM SERPL-MCNC: 9 MG/DL (ref 8.5–10.1)
CHLORIDE SERPL-SCNC: 110 MMOL/L (ref 97–108)
CO2 SERPL-SCNC: 27 MMOL/L (ref 21–32)
COMMENT:: NORMAL
CREAT SERPL-MCNC: 1.99 MG/DL (ref 0.7–1.3)
DIFFERENTIAL METHOD BLD: ABNORMAL
EKG DIAGNOSIS: NORMAL
EKG Q-T INTERVAL: 432 MS
EKG QRS DURATION: 116 MS
EKG QTC CALCULATION (BAZETT): 438 MS
EKG R AXIS: 41 DEGREES
EKG T AXIS: 246 DEGREES
EKG VENTRICULAR RATE: 62 BPM
EOSINOPHIL # BLD: 0.1 K/UL (ref 0–0.4)
EOSINOPHIL NFR BLD: 3 % (ref 0–7)
ERYTHROCYTE [DISTWIDTH] IN BLOOD BY AUTOMATED COUNT: 15.9 % (ref 11.5–14.5)
GLOBULIN SER CALC-MCNC: 2.9 G/DL (ref 2–4)
GLUCOSE SERPL-MCNC: 153 MG/DL (ref 65–100)
HCT VFR BLD AUTO: 40.4 % (ref 36.6–50.3)
HGB BLD-MCNC: 13.4 G/DL (ref 12.1–17)
IMM GRANULOCYTES # BLD AUTO: 0 K/UL (ref 0–0.04)
IMM GRANULOCYTES NFR BLD AUTO: 0 % (ref 0–0.5)
LYMPHOCYTES # BLD: 1 K/UL (ref 0.8–3.5)
LYMPHOCYTES NFR BLD: 19 % (ref 12–49)
MAGNESIUM SERPL-MCNC: 2.2 MG/DL (ref 1.6–2.4)
MCH RBC QN AUTO: 29.3 PG (ref 26–34)
MCHC RBC AUTO-ENTMCNC: 33.2 G/DL (ref 30–36.5)
MCV RBC AUTO: 88.2 FL (ref 80–99)
MONOCYTES # BLD: 0.5 K/UL (ref 0–1)
MONOCYTES NFR BLD: 10 % (ref 5–13)
NEUTS SEG # BLD: 3.3 K/UL (ref 1.8–8)
NEUTS SEG NFR BLD: 68 % (ref 32–75)
NRBC # BLD: 0 K/UL (ref 0–0.01)
NRBC BLD-RTO: 0 PER 100 WBC
NT PRO BNP: 3248 PG/ML
PLATELET # BLD AUTO: 144 K/UL (ref 150–400)
PMV BLD AUTO: 12 FL (ref 8.9–12.9)
POTASSIUM SERPL-SCNC: 3.5 MMOL/L (ref 3.5–5.1)
PROT SERPL-MCNC: 5.9 G/DL (ref 6.4–8.2)
RBC # BLD AUTO: 4.58 M/UL (ref 4.1–5.7)
SODIUM SERPL-SCNC: 142 MMOL/L (ref 136–145)
SPECIMEN HOLD: NORMAL
TROPONIN I SERPL HS-MCNC: 20 NG/L (ref 0–76)
WBC # BLD AUTO: 4.9 K/UL (ref 4.1–11.1)

## 2024-04-11 PROCEDURE — 99285 EMERGENCY DEPT VISIT HI MDM: CPT

## 2024-04-11 PROCEDURE — 99223 1ST HOSP IP/OBS HIGH 75: CPT | Performed by: INTERNAL MEDICINE

## 2024-04-11 PROCEDURE — 83735 ASSAY OF MAGNESIUM: CPT

## 2024-04-11 PROCEDURE — 71046 X-RAY EXAM CHEST 2 VIEWS: CPT

## 2024-04-11 PROCEDURE — 83880 ASSAY OF NATRIURETIC PEPTIDE: CPT

## 2024-04-11 PROCEDURE — 36415 COLL VENOUS BLD VENIPUNCTURE: CPT

## 2024-04-11 PROCEDURE — 6360000002 HC RX W HCPCS: Performed by: NURSE PRACTITIONER

## 2024-04-11 PROCEDURE — 93010 ELECTROCARDIOGRAM REPORT: CPT | Performed by: INTERNAL MEDICINE

## 2024-04-11 PROCEDURE — 80053 COMPREHEN METABOLIC PANEL: CPT

## 2024-04-11 PROCEDURE — 85025 COMPLETE CBC W/AUTO DIFF WBC: CPT

## 2024-04-11 PROCEDURE — 84484 ASSAY OF TROPONIN QUANT: CPT

## 2024-04-11 PROCEDURE — 96376 TX/PRO/DX INJ SAME DRUG ADON: CPT

## 2024-04-11 PROCEDURE — 93005 ELECTROCARDIOGRAM TRACING: CPT

## 2024-04-11 PROCEDURE — 96374 THER/PROPH/DIAG INJ IV PUSH: CPT

## 2024-04-11 RX ORDER — FUROSEMIDE 10 MG/ML
40 INJECTION INTRAMUSCULAR; INTRAVENOUS
Status: COMPLETED | OUTPATIENT
Start: 2024-04-11 | End: 2024-04-11

## 2024-04-11 RX ADMIN — FUROSEMIDE 40 MG: 10 INJECTION, SOLUTION INTRAMUSCULAR; INTRAVENOUS at 16:19

## 2024-04-11 RX ADMIN — FUROSEMIDE 40 MG: 10 INJECTION, SOLUTION INTRAMUSCULAR; INTRAVENOUS at 13:18

## 2024-04-11 ASSESSMENT — PAIN SCALES - GENERAL: PAINLEVEL_OUTOF10: 0

## 2024-04-11 NOTE — ED PROVIDER NOTES
predictive values for acute heart failure:  Acutely dyspneic patient: age <50, use cutoff of 450 pg/mL  Acutely dyspneic patient: age 50-75, use cutoff of 900 pg/mL  Acutely dyspneic patient: age >75,                            use cutoff of 1800 pg/mL     FDA approved cutpoints for ruling-out heart failure in the office:  Ambulatory patient: age <76, use cutoff of 125 pg/mL  Ambulatory patient: age >75, use cutoff of 450 pg/mL      Specimen HOld 04/11/2024 1RED,1BLU   Final    Comment: 04/11/2024 Add-on orders for these samples will be processed based on acceptable specimen integrity and analyte stability, which may vary by analyte.    Final         EMERGENCY DEPARTMENT COURSE and DIFFERENTIAL DIAGNOSIS/MDM:   Vitals:    Vitals:    04/11/24 1619 04/11/24 1658 04/11/24 1713 04/11/24 1745   BP: (!) 113/92 110/79 110/76 121/89   Pulse:  82 68 77   Resp:  17  21   Temp:       TempSrc:       SpO2:    95%   Weight:       Height:               Medical Decision Making  79M w/ hx AF on DOAC, HTN, CHF, CKD, severe MR p/w 5-6months SOB. Pt very pleasant, afebrile, hemodynamically stable. NO hypoxia, increased WOB or resp distress. Ddx includes PNA vs URI/bronchitis/flu/COVID19 vs COPD/asthma exacerbation vs decompensated CHF vs PNX vs pulm edema/pleural effusion vs valvular heart disease vs pulm fibrosis/HTN vs interstitial lung disease vs malignancy/mets vs ACS vs cardiac dysrythmia vs HTN emergency/urgency vs symptomatic anemia vs electrolyte/metabolic, less likely PE based on Wells/Churchill score, unlikely pericardial effusion/tamponade based on presentation. Ordered Cxr, EKG, labs. Consult cardiology. Monitor and reasess.    1300 Pt remains stable and feeling well. CXR unremarkable, no pulm edema or effusion. EKG showing rate controlled AF w/ inferior-lateral STD (old) and no GERI w/ frequent PVCs. Trop neg. BNP 3200. Stable CKD and elevated Tbili. Pt seen in ED by cardiology (Dr Allen) who thinks stable to go home. He  recommends 2 doses of IV lasix and discharge w/ outpatient cardiology f/u.    Patient given specific return precautions and explained signs/symptoms for which to come back to ED immediately but otherwise advised to f/u w/ PCP over next 2-3days.      Amount and/or Complexity of Data Reviewed  Independent Historian: caregiver  Labs: ordered. Decision-making details documented in ED Course.  Radiology: ordered and independent interpretation performed. Decision-making details documented in ED Course.  ECG/medicine tests: ordered and independent interpretation performed. Decision-making details documented in ED Course.  Discussion of management or test interpretation with external provider(s): Cardiology (Dr Allen)            ED Course            CONSULTS:  IP CONSULT TO CARDIOLOGY    PROCEDURES:  Unless otherwise noted below, none     Procedures      FINAL IMPRESSION      1. Acute congestive heart failure, unspecified heart failure type (HCC)    2. Chronic atrial fibrillation (HCC)    3. Chronic kidney disease, unspecified CKD stage          DISPOSITION/PLAN   DISPOSITION Decision To Discharge 04/11/2024 05:26:41 PM      PATIENT REFERRED TO:  [facility]  [address] [place], [place] [id number] [phone]  Schedule an appointment as soon as possible for a visit in 3 days        DISCHARGE MEDICATIONS:  Discharge Medication List as of 4/11/2024  5:26 PM            Tam Monson MD (electronically signed)  Emergency Attending Physician        Tam Monson MD  04/13/24 0915

## 2024-04-11 NOTE — ED NOTES
9:52 AM    Patient is an 79 y.o. with history of mitral regurgitation, atrial fibrillation, chronic anticoagulation, history of tobacco use, hypertension, CKD, and unstable angina who presents emergency room for shortness of breath and referral from his cardiologist.  Patient reports that he has been having shortness of breath especially at night over the last few days.  Patient has been having shortness of breath throughout the day whenever he is just sitting on the couch.  Patient denies any chest pain.  Patient is supposed to have a cardiothoracic surgery completed in May.  Patient reports some cough at home as well.    I have evaluated the patient as the Provider in Rapid Medical Evaluation (RME). I have reviewed his vital signs and the triage nurse assessment. I have talked with the patient and any available family and advised that I am the provider in triage and have ordered the appropriate study to initiate their work up based on the clinical presentation during my assessment. I have advised that the patient will be accommodated in the Main ED as soon as possible. I have also requested to contact the triage nurse or myself immediately if the patient experiences any changes in their condition during this brief waiting period.  MARY Issa Reagan, PA-C  04/11/24 0955

## 2024-04-11 NOTE — ED NOTES
Patient received D/C paperwork and expressed verbal understanding of orders. IV taken out. Patient wheeled to front of ER without incident.

## 2024-04-11 NOTE — CONSULTS
filling defect or obstruction.  Ureters: The delayed images demonstrate excretion of contrast into the ureters  with no filling defect or obstruction.  PELVIS:  Reproductive organs: The prostate gland is enlarged and measures 6.7 x 6.5 x 8.1  cm (176 cc). There is a large right hydrocele.  Bladder: The urinary bladder wall is thickened. The delayed images demonstrate  excretion of contrast into the urinary bladder with no filling defect.  RETROPERITONEUM: The aorta is atherosclerotic and tapers without aneurysm. There  is no retroperitoneal adenopathy or mass. There is no pelvic adenopathy or mass.  BOWEL AND MESENTERY: The bowel is not obstructed. The appendix . There is no  mesenteric adenopathy or mass.  PERITONEUM: There is no ascites or free intraperitoneal air..  BONES AND SOFT TISSUES: There are bilateral fat-containing inguinal hernias.    Impression  IMPRESSION:  1. Tiny nonobstructing left renal calculus.  2. Enlarged prostate gland with thickened urinary bladder wall.  3. No other cause for hematuria identified.  4. Atherosclerotic abdominal aorta without aneurysm.  5. Bilateral inguinal hernias.  6. Right hydrocele.    Xray Result (most recent):  XR CHEST STANDARD TWO VW 04/11/2024    Narrative  EXAM: XR CHEST (2 VW)    INDICATION:  cough and SOB    COMPARISON: 8/31/2020    TECHNIQUE: PA and lateral chest views    FINDINGS: Heart size is enlarged but stable. The pulmonary vasculature is within  normal limits.    Lung volumes are moderate with bibasilar atelectasis or minimal infiltrate. The  visualized bones and upper abdomen are age-appropriate.    Impression  Bibasilar atelectasis or minimal infiltrate. Stable cardiomegaly.        Recent Labs     04/11/24  1003      K 3.5   *   CO2 27   BUN 25*   MG 2.2   ALT 38     Recent Labs     04/11/24  1003   WBC 4.9   HGB 13.4   HCT 40.4   *     Creatinine (MG/DL)   Date Value   04/11/2024 1.99 (H)   03/13/2024 2.03 (H)   08/31/2020 1.55 (H)

## 2024-04-11 NOTE — ED TRIAGE NOTES
TRIAGE: Pt arrives as a referral from cardiologist with c/o sob that has worsened since October. Pt dx with leaky valve. +bilateral LE edema. Denies cough, cp.

## 2024-04-13 ASSESSMENT — ENCOUNTER SYMPTOMS
COUGH: 1
VOMITING: 0
WHEEZING: 0
BLOOD IN STOOL: 0
ANAL BLEEDING: 0
DIARRHEA: 0
SHORTNESS OF BREATH: 1
ABDOMINAL PAIN: 0
ABDOMINAL DISTENTION: 0
NAUSEA: 0

## 2024-04-16 ENCOUNTER — TELEPHONE (OUTPATIENT)
Age: 79
End: 2024-04-16

## 2024-04-16 NOTE — PROGRESS NOTES
daily    rivaroxaban (XARELTO) 15 MG TABS tablet Take 1 tablet by mouth    amLODIPine (NORVASC) 10 MG tablet Take 1 tablet by mouth daily    bimatoprost (LUMIGAN) 0.01 % SOLN ophthalmic drops Apply 1 drop to eye    brimonidine (ALPHAGAN P) 0.1 % SOLN Apply 1 drop to eye daily    dorzolamide (TRUSOPT) 2 % ophthalmic solution Apply 1 drop to eye    finasteride (PROSCAR) 5 MG tablet ceived the following from [facility]: Outside name: finasteride (PROSCAR) 5 mg tablet    hydrALAZINE (APRESOLINE) 50 MG tablet Take 1 tablet by mouth in the morning and 1 tablet at noon and 1 tablet in the evening.    lisinopril (PRINIVIL;ZESTRIL) 40 MG tablet Take 1 tablet by mouth 2 times daily    rosuvastatin (CRESTOR) 20 MG tablet Take 1 tablet by mouth nightly    timolol (TIMOPTIC) 0.5 % ophthalmic solution Apply 1 drop to eye     No current facility-administered medications for this visit.       MYESHA Owen - RHODA Llanes [facility] [place]  [address], [address]  [place] [address]

## 2024-04-17 ENCOUNTER — OFFICE VISIT (OUTPATIENT)
Age: 79
End: 2024-04-17
Payer: MEDICARE

## 2024-04-17 VITALS
HEIGHT: 70 IN | DIASTOLIC BLOOD PRESSURE: 72 MMHG | BODY MASS INDEX: 26.63 KG/M2 | SYSTOLIC BLOOD PRESSURE: 124 MMHG | HEART RATE: 83 BPM | WEIGHT: 186 LBS | OXYGEN SATURATION: 97 %

## 2024-04-17 DIAGNOSIS — I10 BENIGN ESSENTIAL HTN: ICD-10-CM

## 2024-04-17 DIAGNOSIS — I36.1 NONRHEUMATIC TRICUSPID VALVE REGURGITATION: ICD-10-CM

## 2024-04-17 DIAGNOSIS — I48.20 CHRONIC A-FIB (HCC): ICD-10-CM

## 2024-04-17 DIAGNOSIS — I25.10 CORONARY ARTERY DISEASE INVOLVING NATIVE CORONARY ARTERY OF NATIVE HEART WITHOUT ANGINA PECTORIS: ICD-10-CM

## 2024-04-17 DIAGNOSIS — I34.0 SEVERE MITRAL REGURGITATION: Primary | ICD-10-CM

## 2024-04-17 PROCEDURE — 3074F SYST BP LT 130 MM HG: CPT | Performed by: NURSE PRACTITIONER

## 2024-04-17 PROCEDURE — 99214 OFFICE O/P EST MOD 30 MIN: CPT | Performed by: NURSE PRACTITIONER

## 2024-04-17 PROCEDURE — 3078F DIAST BP <80 MM HG: CPT | Performed by: NURSE PRACTITIONER

## 2024-04-17 PROCEDURE — 1036F TOBACCO NON-USER: CPT | Performed by: NURSE PRACTITIONER

## 2024-04-17 PROCEDURE — G8419 CALC BMI OUT NRM PARAM NOF/U: HCPCS | Performed by: NURSE PRACTITIONER

## 2024-04-17 PROCEDURE — G8427 DOCREV CUR MEDS BY ELIG CLIN: HCPCS | Performed by: NURSE PRACTITIONER

## 2024-04-17 PROCEDURE — 1123F ACP DISCUSS/DSCN MKR DOCD: CPT | Performed by: NURSE PRACTITIONER

## 2024-04-17 RX ORDER — AMLODIPINE BESYLATE 5 MG/1
5 TABLET ORAL DAILY
Qty: 30 TABLET | Refills: 2 | Status: SHIPPED | OUTPATIENT
Start: 2024-04-17

## 2024-04-17 RX ORDER — TORSEMIDE 20 MG/1
TABLET ORAL
COMMUNITY
Start: 2024-04-07 | End: 2024-04-17

## 2024-04-17 ASSESSMENT — PATIENT HEALTH QUESTIONNAIRE - PHQ9
SUM OF ALL RESPONSES TO PHQ QUESTIONS 1-9: 0
SUM OF ALL RESPONSES TO PHQ QUESTIONS 1-9: 0
2. FEELING DOWN, DEPRESSED OR HOPELESS: NOT AT ALL
SUM OF ALL RESPONSES TO PHQ QUESTIONS 1-9: 0
1. LITTLE INTEREST OR PLEASURE IN DOING THINGS: NOT AT ALL
SUM OF ALL RESPONSES TO PHQ9 QUESTIONS 1 & 2: 0
SUM OF ALL RESPONSES TO PHQ QUESTIONS 1-9: 0

## 2024-04-17 NOTE — PATIENT INSTRUCTIONS
Please reduce amlodipine to 5mg daily  - you can cut your 10mg tablet in half and just take one half daily.  This should help with leg swelling    Please check to make sure you are taking furosemide/lasix 40mg 2 tabs daily  If you are, please increase your dose to two 40mg tablets of furosemide/lasix in the morning and one 40mg tablet of furosemide/lasix after lunch time     Please weigh yourself wearing the same amount of clothing every day after you have voided.  Keep a written record of your daily weights and bring it to each appointment with your cardiologist.  If you notice that your weight has increased by 5 pounds or more in 3 days please call the office at [phone].    Please have labs drawn Friday 4/26/24 at the [facility]  If you are doing well and don't feel like you need to see the cardiology NP you may call and cancel the appointment for Friday 4/26/24

## 2024-04-19 ENCOUNTER — TELEPHONE (OUTPATIENT)
Age: 79
End: 2024-04-19

## 2024-04-19 NOTE — TELEPHONE ENCOUNTER
Telephone call made to patient's wife on PHI. Two patient identifiers verified.    Wife wondering how pt is supposed to take lasix. Advised to take 2 pills (80mg) in the am and 1 pill (40mg) after lunch. Wife verbalizes understanding    Wife wondering what changed with amlodipine. Advised for pt to split pill in half and take 10mg. RN educated wife that these medications should help with pt leg swelling. Asked how pt swelling is; wife reports he is still a little swollen but that it is going down.    Wife clarifying pt is to take carvedilol twice a day; RN confirms. No further questions.

## 2024-04-19 NOTE — TELEPHONE ENCOUNTER
Patient spouse called in stating that they have a few medication questions. Patient wife is requesting to have an call back please.      Patient wife ( [place] ) # ~ [phone]

## 2024-04-26 ENCOUNTER — TELEPHONE (OUTPATIENT)
Age: 79
End: 2024-04-26

## 2024-04-26 ENCOUNTER — OFFICE VISIT (OUTPATIENT)
Age: 79
End: 2024-04-26
Payer: MEDICARE

## 2024-04-26 VITALS
DIASTOLIC BLOOD PRESSURE: 78 MMHG | WEIGHT: 181.6 LBS | HEART RATE: 60 BPM | SYSTOLIC BLOOD PRESSURE: 126 MMHG | BODY MASS INDEX: 26.06 KG/M2 | OXYGEN SATURATION: 98 %

## 2024-04-26 DIAGNOSIS — I48.20 CHRONIC A-FIB (HCC): ICD-10-CM

## 2024-04-26 DIAGNOSIS — I36.1 NONRHEUMATIC TRICUSPID VALVE REGURGITATION: ICD-10-CM

## 2024-04-26 DIAGNOSIS — I34.0 SEVERE MITRAL REGURGITATION: ICD-10-CM

## 2024-04-26 DIAGNOSIS — I48.20 CHRONIC A-FIB (HCC): Primary | ICD-10-CM

## 2024-04-26 DIAGNOSIS — I50.32 CHRONIC DIASTOLIC HEART FAILURE (HCC): ICD-10-CM

## 2024-04-26 DIAGNOSIS — N18.31 STAGE 3A CHRONIC KIDNEY DISEASE (HCC): ICD-10-CM

## 2024-04-26 LAB
ANION GAP SERPL CALC-SCNC: 4 MMOL/L (ref 5–15)
BUN SERPL-MCNC: 28 MG/DL (ref 6–20)
BUN/CREAT SERPL: 13 (ref 12–20)
CALCIUM SERPL-MCNC: 9.4 MG/DL (ref 8.5–10.1)
CHLORIDE SERPL-SCNC: 109 MMOL/L (ref 97–108)
CO2 SERPL-SCNC: 32 MMOL/L (ref 21–32)
CREAT SERPL-MCNC: 2.16 MG/DL (ref 0.7–1.3)
GLUCOSE SERPL-MCNC: 92 MG/DL (ref 65–100)
MAGNESIUM SERPL-MCNC: 2.2 MG/DL (ref 1.6–2.4)
NT PRO BNP: 3398 PG/ML
POTASSIUM SERPL-SCNC: 3.6 MMOL/L (ref 3.5–5.1)
SODIUM SERPL-SCNC: 145 MMOL/L (ref 136–145)

## 2024-04-26 PROCEDURE — G8419 CALC BMI OUT NRM PARAM NOF/U: HCPCS | Performed by: NURSE PRACTITIONER

## 2024-04-26 PROCEDURE — 99214 OFFICE O/P EST MOD 30 MIN: CPT | Performed by: NURSE PRACTITIONER

## 2024-04-26 PROCEDURE — G8427 DOCREV CUR MEDS BY ELIG CLIN: HCPCS | Performed by: NURSE PRACTITIONER

## 2024-04-26 PROCEDURE — 1036F TOBACCO NON-USER: CPT | Performed by: NURSE PRACTITIONER

## 2024-04-26 PROCEDURE — 1123F ACP DISCUSS/DSCN MKR DOCD: CPT | Performed by: NURSE PRACTITIONER

## 2024-04-26 PROCEDURE — 3078F DIAST BP <80 MM HG: CPT | Performed by: NURSE PRACTITIONER

## 2024-04-26 PROCEDURE — 3074F SYST BP LT 130 MM HG: CPT | Performed by: NURSE PRACTITIONER

## 2024-04-26 ASSESSMENT — PATIENT HEALTH QUESTIONNAIRE - PHQ9
1. LITTLE INTEREST OR PLEASURE IN DOING THINGS: NOT AT ALL
SUM OF ALL RESPONSES TO PHQ QUESTIONS 1-9: 0
SUM OF ALL RESPONSES TO PHQ QUESTIONS 1-9: 0
SUM OF ALL RESPONSES TO PHQ9 QUESTIONS 1 & 2: 0
SUM OF ALL RESPONSES TO PHQ QUESTIONS 1-9: 0
SUM OF ALL RESPONSES TO PHQ QUESTIONS 1-9: 0
2. FEELING DOWN, DEPRESSED OR HOPELESS: NOT AT ALL

## 2024-04-26 NOTE — TELEPHONE ENCOUNTER
Please call patient and let him know Dr. Allen said he could expect to spend two days in the hospital and recover at home about a week

## 2024-04-26 NOTE — PROGRESS NOTES
[facility]  [phone]    Cardiologist: Dr. José Antonio Rico    History of Present Illness:   Mr. Sabillon is a 78 yo M with hx of hypertension, atrial fibrillation on anticoagulation, CKD stage 2-3.  Echo 12/27/2016 was normal with an EF of 55-60% and mild mitral regurgitation.  Echo 3/7/24 showed EF 55-60%, concentric LVH, flail posterior leaflet of MV with single ruptured chord and severe MR with posterior directed jet, mod to severe TR with mild PA HTN (RVSP 41mmHg), ascending Ao 4.1cm.  He has been having recent hospitalizations for shortness of breath.  He was referred to Dr. Allen for structural heart evaluation on 3/13/24. He determined that \"he would benefit from definitive intervention on his mitral valve which could be in the form of surgery versus transcatheter [id number]-ji-[id number] repair. 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  By ERO based calculation mitral regurgitation appears to be severe but it may be overestimated.  By color, MR appears to be moderate to severe and is likely secondary to posterior leaflet prolapse with possible partial flail component.  There is definitely evidence of flail subvalvular chordal structure.  Pulmonary vein flow pattern suggests some blunting of systolic vein flow which is likely secondary adequate left atrial compliance and volume.  Notably MR

## 2024-04-29 ENCOUNTER — TELEPHONE (OUTPATIENT)
Age: 79
End: 2024-04-29

## 2024-04-29 RX ORDER — POTASSIUM CHLORIDE 1500 MG/1
20 TABLET, EXTENDED RELEASE ORAL DAILY
Qty: 135 TABLET | Refills: 1 | Status: SHIPPED | COMMUNITY
Start: 2024-04-29 | End: 2024-04-29 | Stop reason: SDUPTHER

## 2024-04-29 RX ORDER — POTASSIUM CHLORIDE 1500 MG/1
20 TABLET, EXTENDED RELEASE ORAL DAILY
Qty: 135 TABLET | Refills: 1 | Status: SHIPPED | OUTPATIENT
Start: 2024-04-29 | End: 2024-04-29

## 2024-04-29 RX ORDER — POTASSIUM CHLORIDE 1500 MG/1
TABLET, EXTENDED RELEASE ORAL
Qty: 135 TABLET | Refills: 1 | Status: SHIPPED | OUTPATIENT
Start: 2024-04-29

## 2024-04-29 NOTE — TELEPHONE ENCOUNTER
----- Message from MYESHA Phillips - NP sent at 4/28/2024  2:35 PM EDT -----  Please call and let him know pro BNP is still elevated but he was feeling a lot better so will leave diuretic dose as is.  K is 3.6 so please ask him to take KCL 20meq MWF and KCL 40meq every other day.  Renal function fairly stable.    Rico patient

## 2024-04-29 NOTE — TELEPHONE ENCOUNTER
Telephone call made to patient. Two patient identifiers verified.   Went over results with patient. Verified understanding. All questions answered.     Requested Prescriptions     Signed Prescriptions Disp Refills    KLOR-CON M20 20 MEQ extended release tablet 135 tablet 1     Sig: Take 1 tab (20 meq) Monday, Wednesday, Friday. Take 2 tabs (40 meq) all other days.     Authorizing Provider: JOSÉ ANTONIO RICO     Ordering User: JAHAIRA PETERSEN per MD    Future Appointments   Date Time Provider Department Center   5/3/2024  8:00 AM [facility] PAT EXAM RM 1 [facility]   5/3/2024 11:30 AM Justo Vazquez MD [facility] BS AMB   7/8/2024 11:20 AM [facility]

## 2024-05-01 ENCOUNTER — TELEPHONE (OUTPATIENT)
Age: 79
End: 2024-05-01

## 2024-05-01 NOTE — TELEPHONE ENCOUNTER
Mrs. Sabillon called with questions regarding potassium prescription. I referenced instructions charted by Dr. Rico's office regarding taking 20meq on MWF and 40 meqs on the other days of the week. Also reviewed upcoming appointments, specifically instructions for PAT this coming Friday, 05/03/2024.

## 2024-05-03 ENCOUNTER — HOSPITAL ENCOUNTER (OUTPATIENT)
Facility: HOSPITAL | Age: 79
End: 2024-05-03
Payer: MEDICARE

## 2024-05-03 VITALS
RESPIRATION RATE: 14 BRPM | HEIGHT: 70 IN | HEART RATE: 71 BPM | WEIGHT: 179 LBS | TEMPERATURE: 97.9 F | SYSTOLIC BLOOD PRESSURE: 130 MMHG | DIASTOLIC BLOOD PRESSURE: 76 MMHG | BODY MASS INDEX: 25.62 KG/M2

## 2024-05-03 DIAGNOSIS — I34.0 SEVERE MITRAL REGURGITATION: ICD-10-CM

## 2024-05-03 LAB
ABO + RH BLD: NORMAL
ALBUMIN SERPL-MCNC: 3.4 G/DL (ref 3.5–5)
ALBUMIN/GLOB SERPL: 1.2 (ref 1.1–2.2)
ALP SERPL-CCNC: 44 U/L (ref 45–117)
ALT SERPL-CCNC: 24 U/L (ref 12–78)
ANION GAP SERPL CALC-SCNC: 2 MMOL/L (ref 5–15)
APTT PPP: 29.3 SEC (ref 22.1–31)
AST SERPL-CCNC: 19 U/L (ref 15–37)
BASOPHILS # BLD: 0 K/UL (ref 0–0.1)
BASOPHILS NFR BLD: 0 % (ref 0–1)
BILIRUB SERPL-MCNC: 1.2 MG/DL (ref 0.2–1)
BLOOD GROUP ANTIBODIES SERPL: NORMAL
BUN SERPL-MCNC: 29 MG/DL (ref 6–20)
BUN/CREAT SERPL: 12 (ref 12–20)
CALCIUM SERPL-MCNC: 9.3 MG/DL (ref 8.5–10.1)
CHLORIDE SERPL-SCNC: 109 MMOL/L (ref 97–108)
CO2 SERPL-SCNC: 31 MMOL/L (ref 21–32)
CREAT SERPL-MCNC: 2.33 MG/DL (ref 0.7–1.3)
DIFFERENTIAL METHOD BLD: ABNORMAL
EKG DIAGNOSIS: NORMAL
EKG Q-T INTERVAL: 434 MS
EKG QRS DURATION: 120 MS
EKG QTC CALCULATION (BAZETT): 465 MS
EKG R AXIS: 4 DEGREES
EKG T AXIS: 246 DEGREES
EKG VENTRICULAR RATE: 69 BPM
EOSINOPHIL # BLD: 0 K/UL (ref 0–0.4)
EOSINOPHIL NFR BLD: 0 % (ref 0–7)
ERYTHROCYTE [DISTWIDTH] IN BLOOD BY AUTOMATED COUNT: 14.3 % (ref 11.5–14.5)
EST. AVERAGE GLUCOSE BLD GHB EST-MCNC: 111 MG/DL
GLOBULIN SER CALC-MCNC: 2.9 G/DL (ref 2–4)
GLUCOSE SERPL-MCNC: 114 MG/DL (ref 65–100)
HBA1C MFR BLD: 5.5 % (ref 4–5.6)
HCT VFR BLD AUTO: 41.9 % (ref 36.6–50.3)
HGB BLD-MCNC: 13.8 G/DL (ref 12.1–17)
HISTORY CHECK: NORMAL
IMM GRANULOCYTES # BLD AUTO: 0 K/UL (ref 0–0.04)
IMM GRANULOCYTES NFR BLD AUTO: 0 % (ref 0–0.5)
INR PPP: 1.3 (ref 0.9–1.1)
LYMPHOCYTES # BLD: 1.1 K/UL (ref 0.8–3.5)
LYMPHOCYTES NFR BLD: 25 % (ref 12–49)
MAGNESIUM SERPL-MCNC: 2.4 MG/DL (ref 1.6–2.4)
MCH RBC QN AUTO: 28.4 PG (ref 26–34)
MCHC RBC AUTO-ENTMCNC: 32.9 G/DL (ref 30–36.5)
MCV RBC AUTO: 86.2 FL (ref 80–99)
MONOCYTES # BLD: 0.4 K/UL (ref 0–1)
MONOCYTES NFR BLD: 9 % (ref 5–13)
NEUTS SEG # BLD: 3 K/UL (ref 1.8–8)
NEUTS SEG NFR BLD: 66 % (ref 32–75)
NRBC # BLD: 0 K/UL (ref 0–0.01)
NRBC BLD-RTO: 0 PER 100 WBC
PLATELET # BLD AUTO: 134 K/UL (ref 150–400)
PMV BLD AUTO: 12 FL (ref 8.9–12.9)
POTASSIUM SERPL-SCNC: 3.6 MMOL/L (ref 3.5–5.1)
PROT SERPL-MCNC: 6.3 G/DL (ref 6.4–8.2)
PROTHROMBIN TIME: 13.3 SEC (ref 9–11.1)
RBC # BLD AUTO: 4.86 M/UL (ref 4.1–5.7)
SODIUM SERPL-SCNC: 142 MMOL/L (ref 136–145)
SPECIMEN EXP DATE BLD: NORMAL
THERAPEUTIC RANGE: NORMAL SECS (ref 58–77)
TSH SERPL DL<=0.05 MIU/L-ACNC: 4.16 UIU/ML (ref 0.36–3.74)
WBC # BLD AUTO: 4.6 K/UL (ref 4.1–11.1)

## 2024-05-03 PROCEDURE — 71046 X-RAY EXAM CHEST 2 VIEWS: CPT

## 2024-05-03 PROCEDURE — 83735 ASSAY OF MAGNESIUM: CPT

## 2024-05-03 PROCEDURE — 36415 COLL VENOUS BLD VENIPUNCTURE: CPT

## 2024-05-03 PROCEDURE — 86923 COMPATIBILITY TEST ELECTRIC: CPT

## 2024-05-03 PROCEDURE — 86900 BLOOD TYPING SEROLOGIC ABO: CPT

## 2024-05-03 PROCEDURE — 85610 PROTHROMBIN TIME: CPT

## 2024-05-03 PROCEDURE — 86850 RBC ANTIBODY SCREEN: CPT

## 2024-05-03 PROCEDURE — 80053 COMPREHEN METABOLIC PANEL: CPT

## 2024-05-03 PROCEDURE — 84443 ASSAY THYROID STIM HORMONE: CPT

## 2024-05-03 PROCEDURE — 85730 THROMBOPLASTIN TIME PARTIAL: CPT

## 2024-05-03 PROCEDURE — 86901 BLOOD TYPING SEROLOGIC RH(D): CPT

## 2024-05-03 PROCEDURE — 83036 HEMOGLOBIN GLYCOSYLATED A1C: CPT

## 2024-05-03 PROCEDURE — 93005 ELECTROCARDIOGRAM TRACING: CPT | Performed by: INTERNAL MEDICINE

## 2024-05-03 PROCEDURE — 85025 COMPLETE CBC W/AUTO DIFF WBC: CPT

## 2024-05-03 RX ORDER — SODIUM CHLORIDE 9 MG/ML
INJECTION, SOLUTION INTRAVENOUS PRN
Status: DISCONTINUED | OUTPATIENT
Start: 2024-05-03 | End: 2024-05-07 | Stop reason: HOSPADM

## 2024-05-03 NOTE — PERIOP NOTE
PATIENT DEMONSTRATED PROPER USE OF INCENTIVE SPIROMETER.    PATIENT'S WIFE, VIRGINIA WAS WITH PATIENT FOR PAT APPT TODAY.    PATIENT INSTRUCTED TO REPORT TO [facility] UPON LEAVING [facility] FOR FURTHER TESTING.  HE HAS A COPY OF HIS \"CARDIAC PASSPORT\" WITH HIM.      PATIENT DID NOT HAVE A LIST OF HIS MEDICATIONS WITH HIM TODAY AND WAS NOT SURE OF MEDICATION NAMES & DOSAGES.  INSTRUCTED TO CALL ME AS SOON AS THEY GOT HOME SO I COULD REVIEW WITH HIM.      1500:  CONTACTED PATIENT TO REVIEW HIS MEDICATIONS.  SPOKE WITH HIS WIFE, VIRGINIA.  MEDICATION LIST HAS BEEN UPDATED IN EMR.  VIRGINIA STATED THAT THEY GOT INSTRUCTIONS FROM OFFICE ON WHICH MEDS TO TAKE/STOP PRIOR TO SURGERY.

## 2024-05-03 NOTE — PERIOP NOTE
parts(genitals). Do not use CHG soap on open sores, wounds or areas of skin irritation.  Wash you body gently for 5 minutes. Do not wash your skin too hard. This soap does not create lather. Pay special attention to your underarms and from your belly button to your feet.  Turn the shower back on and rinse well to get CHG soap off your body.  Pat your skin dry with a clean, dry towel. Do not apply lotions or moisturizer.  Put on clean clothes and sleep on fresh bed sheets and do not allow pets to sleep with you.    Shower with CHG soap 2 times before your surgery  The evening before your surgery  The morning of your surgery      Tips to help prevent infections after your surgery:  Protect your surgical wound from germs:  Hand washing is the most important thing you and your caregivers can do to prevent infections.  Keep your bandage clean and dry!  Do not touch your surgical wound.  Use clean, freshly washed towels and washcloths every time you shower; do not share bath linens with others.  Until your surgical wound is healed, wear clothing and sleep on bed linens that are clean.   Do not allow pets to sleep in your bed with you or touch your surgical wound.  Do not smoke - smoking delays wound healing. This may be a good time to stop smoking.  If you have diabetes, it is important for you to manage your blood sugar levels properly before your surgery as well as after your surgery. Poorly managed blood sugar levels slow down wound healing and prevent you from healing completely.       Incentive Spirometer        Using the incentive spirometer helps expand the small air sacs of your lungs, helps you breathe deeply, and helps improve your lung function.  Use your incentive spirometer twice a day (10 breaths each time) prior to surgery.      How to Use Your Incentive Spirometer:  Hold the incentive spirometer in an upright position.   Breathe out as usual.   Place the mouthpiece in your mouth and seal your lips tightly  around it.   Take a deep breath.  Breathe in slowly and as deeply as possible. Keep the blue flow rate guide between the arrows.   Hold your breath as long as possible. Then exhale slowly and allow the piston to fall to the bottom of the column.   Rest for a few seconds and repeat steps one through five at least 10 times.         Follow all instructions so your surgery won’t be cancelled.  Please, be on time.                    If a situation occurs and you are delayed the day of surgery, call [phone]/ [phone].    If your physical condition changes (like a fever, cold, flu, etc.) call your surgeon as soon as possible.    Home medication(s) reviewed and verified via during PAT appointment/call.    The patient was contacted in person.     He verbalized understanding of all instructions does not need reinforcement.

## 2024-05-06 ENCOUNTER — TELEPHONE (OUTPATIENT)
Age: 79
End: 2024-05-06

## 2024-05-06 NOTE — TELEPHONE ENCOUNTER
Patient called with questions about the klor-con medicine. He doesn't know how to take it.    Patient # [phone]

## 2024-05-06 NOTE — TELEPHONE ENCOUNTER
Telephone call made to patient's wife, Two patient identifiers verified. She went over the medicine with me and verified understanding.

## 2024-05-06 NOTE — TELEPHONE ENCOUNTER
Telephone call made to patient. Two patient identifiers verified. Spoke to patient's wife, per HIPAA and verified on PHI; discussed with her regarding Dr. Allen/Jeimy's message. She verbalized understanding and was appreciative of the call.

## 2024-05-08 ENCOUNTER — TELEPHONE (OUTPATIENT)
Age: 79
End: 2024-05-08

## 2024-05-08 ENCOUNTER — ANESTHESIA EVENT (OUTPATIENT)
Facility: HOSPITAL | Age: 79
End: 2024-05-08
Payer: MEDICARE

## 2024-05-08 NOTE — TELEPHONE ENCOUNTER
Cardiac Surgery Care Coordinator-  Called Brent Sabillon Jr. to review plan of care and day of surgery expectations.  Encouraged Brent Sabillon Jr. to verbalize and offered emotional support.  Brent Sabillon Jr. is without questions or concerns at this time.

## 2024-05-09 ENCOUNTER — APPOINTMENT (OUTPATIENT)
Facility: HOSPITAL | Age: 79
End: 2024-05-09
Attending: INTERNAL MEDICINE
Payer: MEDICARE

## 2024-05-09 ENCOUNTER — ANESTHESIA (OUTPATIENT)
Facility: HOSPITAL | Age: 79
End: 2024-05-09
Payer: MEDICARE

## 2024-05-09 ENCOUNTER — HOSPITAL ENCOUNTER (OUTPATIENT)
Facility: HOSPITAL | Age: 79
Discharge: HOME | End: 2024-05-11
Attending: INTERNAL MEDICINE

## 2024-05-09 ENCOUNTER — HOSPITAL ENCOUNTER (INPATIENT)
Facility: HOSPITAL | Age: 79
LOS: 2 days | Discharge: HOME | End: 2024-05-11
Attending: INTERNAL MEDICINE | Admitting: INTERNAL MEDICINE
Payer: MEDICARE

## 2024-05-09 DIAGNOSIS — I34.0 NONRHEUMATIC MITRAL VALVE REGURGITATION: ICD-10-CM

## 2024-05-09 DIAGNOSIS — I50.33 ACUTE ON CHRONIC HEART FAILURE WITH PRESERVED EJECTION FRACTION (HCC): Primary | ICD-10-CM

## 2024-05-09 DIAGNOSIS — Z98.890 PERSONAL HISTORY OF SURGERY TO HEART AND GREAT VESSELS, PRESENTING HAZARDS TO HEALTH: Primary | ICD-10-CM

## 2024-05-09 LAB
ACT BLD: 158 SECS (ref 79–138)
ACT BLD: 249 SECS (ref 79–138)
ACT BLD: 266 SECS (ref 79–138)
ACT BLD: 271 SECS (ref 79–138)
ACT BLD: 271 SECS (ref 79–138)
ACT BLD: 282 SECS (ref 79–138)
ACT BLD: 466 SECS (ref 79–138)
ALBUMIN SERPL-MCNC: 3.1 G/DL (ref 3.5–5)
ALBUMIN/GLOB SERPL: 1.1 (ref 1.1–2.2)
ALP SERPL-CCNC: 38 U/L (ref 45–117)
ALT SERPL-CCNC: 20 U/L (ref 12–78)
ANION GAP SERPL CALC-SCNC: 6 MMOL/L (ref 5–15)
APTT PPP: 29.3 SEC (ref 22.1–31)
AST SERPL-CCNC: 16 U/L (ref 15–37)
BILIRUB SERPL-MCNC: 0.6 MG/DL (ref 0.2–1)
BUN SERPL-MCNC: 30 MG/DL (ref 6–20)
BUN/CREAT SERPL: 14 (ref 12–20)
CALCIUM SERPL-MCNC: 8.9 MG/DL (ref 8.5–10.1)
CHLORIDE SERPL-SCNC: 111 MMOL/L (ref 97–108)
CO2 SERPL-SCNC: 28 MMOL/L (ref 21–32)
CREAT SERPL-MCNC: 2.19 MG/DL (ref 0.7–1.3)
ECHO BSA: 2 M2
EKG DIAGNOSIS: NORMAL
EKG Q-T INTERVAL: 494 MS
EKG QRS DURATION: 122 MS
EKG QTC CALCULATION (BAZETT): 459 MS
EKG R AXIS: -8 DEGREES
EKG T AXIS: -84 DEGREES
EKG VENTRICULAR RATE: 52 BPM
ERYTHROCYTE [DISTWIDTH] IN BLOOD BY AUTOMATED COUNT: 14.3 % (ref 11.5–14.5)
GLOBULIN SER CALC-MCNC: 2.9 G/DL (ref 2–4)
GLUCOSE SERPL-MCNC: 131 MG/DL (ref 65–100)
HCT VFR BLD AUTO: 37.5 % (ref 36.6–50.3)
HGB BLD-MCNC: 13 G/DL (ref 12.1–17)
INR PPP: 1.4 (ref 0.9–1.1)
MAGNESIUM SERPL-MCNC: 2.1 MG/DL (ref 1.6–2.4)
MCH RBC QN AUTO: 29 PG (ref 26–34)
MCHC RBC AUTO-ENTMCNC: 34.7 G/DL (ref 30–36.5)
MCV RBC AUTO: 83.7 FL (ref 80–99)
NRBC # BLD: 0 K/UL (ref 0–0.01)
NRBC BLD-RTO: 0 PER 100 WBC
NT PRO BNP: 3002 PG/ML
PLATELET # BLD AUTO: 111 K/UL (ref 150–400)
PMV BLD AUTO: 11.6 FL (ref 8.9–12.9)
POTASSIUM SERPL-SCNC: 2.9 MMOL/L (ref 3.5–5.1)
PROT SERPL-MCNC: 6 G/DL (ref 6.4–8.2)
PROTHROMBIN TIME: 14.3 SEC (ref 9–11.1)
RBC # BLD AUTO: 4.48 M/UL (ref 4.1–5.7)
SODIUM SERPL-SCNC: 145 MMOL/L (ref 136–145)
THERAPEUTIC RANGE: NORMAL SECS (ref 58–77)
WBC # BLD AUTO: 5.1 K/UL (ref 4.1–11.1)

## 2024-05-09 PROCEDURE — 3700000001 HC ADD 15 MINUTES (ANESTHESIA): Performed by: INTERNAL MEDICINE

## 2024-05-09 PROCEDURE — 2580000003 HC RX 258: Performed by: ANESTHESIOLOGY

## 2024-05-09 PROCEDURE — 83735 ASSAY OF MAGNESIUM: CPT

## 2024-05-09 PROCEDURE — C1725 CATH, TRANSLUMIN NON-LASER: HCPCS | Performed by: INTERNAL MEDICINE

## 2024-05-09 PROCEDURE — 99223 1ST HOSP IP/OBS HIGH 75: CPT | Performed by: INTERNAL MEDICINE

## 2024-05-09 PROCEDURE — 2580000003 HC RX 258: Performed by: NURSE ANESTHETIST, CERTIFIED REGISTERED

## 2024-05-09 PROCEDURE — 7100000001 HC PACU RECOVERY - ADDTL 15 MIN: Performed by: INTERNAL MEDICINE

## 2024-05-09 PROCEDURE — C1893 INTRO/SHEATH, FIXED,NON-PEEL: HCPCS | Performed by: INTERNAL MEDICINE

## 2024-05-09 PROCEDURE — 3600000017 HC SURGERY HYBRID ADDL 15MIN: Performed by: INTERNAL MEDICINE

## 2024-05-09 PROCEDURE — 3700000000 HC ANESTHESIA ATTENDED CARE: Performed by: INTERNAL MEDICINE

## 2024-05-09 PROCEDURE — 85730 THROMBOPLASTIN TIME PARTIAL: CPT

## 2024-05-09 PROCEDURE — B2111ZZ FLUOROSCOPY OF MULTIPLE CORONARY ARTERIES USING LOW OSMOLAR CONTRAST: ICD-10-PCS | Performed by: INTERNAL MEDICINE

## 2024-05-09 PROCEDURE — 85027 COMPLETE CBC AUTOMATED: CPT

## 2024-05-09 PROCEDURE — 36415 COLL VENOUS BLD VENIPUNCTURE: CPT

## 2024-05-09 PROCEDURE — 6370000000 HC RX 637 (ALT 250 FOR IP): Performed by: ANESTHESIOLOGY

## 2024-05-09 PROCEDURE — 7100000000 HC PACU RECOVERY - FIRST 15 MIN: Performed by: INTERNAL MEDICINE

## 2024-05-09 PROCEDURE — C1769 GUIDE WIRE: HCPCS | Performed by: INTERNAL MEDICINE

## 2024-05-09 PROCEDURE — 02UG3JZ SUPPLEMENT MITRAL VALVE WITH SYNTHETIC SUBSTITUTE, PERCUTANEOUS APPROACH: ICD-10-PCS | Performed by: INTERNAL MEDICINE

## 2024-05-09 PROCEDURE — 83880 ASSAY OF NATRIURETIC PEPTIDE: CPT

## 2024-05-09 PROCEDURE — 2580000003 HC RX 258: Performed by: NURSE PRACTITIONER

## 2024-05-09 PROCEDURE — B24BZZ4 ULTRASONOGRAPHY OF HEART WITH AORTA, TRANSESOPHAGEAL: ICD-10-PCS | Performed by: INTERNAL MEDICINE

## 2024-05-09 PROCEDURE — 80053 COMPREHEN METABOLIC PANEL: CPT

## 2024-05-09 PROCEDURE — 2500000003 HC RX 250 WO HCPCS: Performed by: INTERNAL MEDICINE

## 2024-05-09 PROCEDURE — 2500000003 HC RX 250 WO HCPCS: Performed by: NURSE ANESTHETIST, CERTIFIED REGISTERED

## 2024-05-09 PROCEDURE — 6360000002 HC RX W HCPCS: Performed by: NURSE ANESTHETIST, CERTIFIED REGISTERED

## 2024-05-09 PROCEDURE — C1713 ANCHOR/SCREW BN/BN,TIS/BN: HCPCS | Performed by: INTERNAL MEDICINE

## 2024-05-09 PROCEDURE — C1894 INTRO/SHEATH, NON-LASER: HCPCS | Performed by: INTERNAL MEDICINE

## 2024-05-09 PROCEDURE — 93010 ELECTROCARDIOGRAM REPORT: CPT | Performed by: INTERNAL MEDICINE

## 2024-05-09 PROCEDURE — 85610 PROTHROMBIN TIME: CPT

## 2024-05-09 PROCEDURE — 85347 COAGULATION TIME ACTIVATED: CPT

## 2024-05-09 PROCEDURE — 2060000000 HC ICU INTERMEDIATE R&B

## 2024-05-09 PROCEDURE — 93005 ELECTROCARDIOGRAM TRACING: CPT | Performed by: NURSE PRACTITIONER

## 2024-05-09 PROCEDURE — 6360000002 HC RX W HCPCS: Performed by: NURSE PRACTITIONER

## 2024-05-09 PROCEDURE — C1760 CLOSURE DEV, VASC: HCPCS | Performed by: INTERNAL MEDICINE

## 2024-05-09 PROCEDURE — 3600000007 HC SURGERY HYBRID BASE: Performed by: INTERNAL MEDICINE

## 2024-05-09 PROCEDURE — 4A023N6 MEASUREMENT OF CARDIAC SAMPLING AND PRESSURE, RIGHT HEART, PERCUTANEOUS APPROACH: ICD-10-PCS | Performed by: INTERNAL MEDICINE

## 2024-05-09 PROCEDURE — 71045 X-RAY EXAM CHEST 1 VIEW: CPT

## 2024-05-09 PROCEDURE — 6370000000 HC RX 637 (ALT 250 FOR IP): Performed by: NURSE PRACTITIONER

## 2024-05-09 PROCEDURE — 2709999900 HC NON-CHARGEABLE SUPPLY: Performed by: INTERNAL MEDICINE

## 2024-05-09 DEVICE — SYSTEM VLV REP MITRACLIP G4 BUNDLE USE W/ SGC0701 STEERABLE GUIDE CATH: Type: IMPLANTABLE DEVICE | Site: MITRAL VALVE | Status: FUNCTIONAL

## 2024-05-09 DEVICE — G4 CLIP DELIVERY SYSTEM
Type: IMPLANTABLE DEVICE | Site: MITRAL VALVE | Status: FUNCTIONAL
Brand: MITRACLIP™

## 2024-05-09 RX ORDER — SODIUM CHLORIDE 0.9 % (FLUSH) 0.9 %
5-40 SYRINGE (ML) INJECTION EVERY 12 HOURS SCHEDULED
Status: DISCONTINUED | OUTPATIENT
Start: 2024-05-09 | End: 2024-05-09 | Stop reason: HOSPADM

## 2024-05-09 RX ORDER — FENTANYL CITRATE 50 UG/ML
50 INJECTION, SOLUTION INTRAMUSCULAR; INTRAVENOUS
Status: DISCONTINUED | OUTPATIENT
Start: 2024-05-09 | End: 2024-05-09 | Stop reason: HOSPADM

## 2024-05-09 RX ORDER — SODIUM CHLORIDE 9 MG/ML
INJECTION, SOLUTION INTRAVENOUS PRN
Status: DISCONTINUED | OUTPATIENT
Start: 2024-05-09 | End: 2024-05-11 | Stop reason: HOSPADM

## 2024-05-09 RX ORDER — PROPOFOL 10 MG/ML
INJECTION, EMULSION INTRAVENOUS PRN
Status: DISCONTINUED | OUTPATIENT
Start: 2024-05-09 | End: 2024-05-09 | Stop reason: SDUPTHER

## 2024-05-09 RX ORDER — CARVEDILOL 12.5 MG/1
25 TABLET ORAL 2 TIMES DAILY
Status: DISCONTINUED | OUTPATIENT
Start: 2024-05-09 | End: 2024-05-11 | Stop reason: HOSPADM

## 2024-05-09 RX ORDER — DIPHENHYDRAMINE HYDROCHLORIDE 50 MG/ML
12.5 INJECTION INTRAMUSCULAR; INTRAVENOUS
Status: DISCONTINUED | OUTPATIENT
Start: 2024-05-09 | End: 2024-05-09 | Stop reason: HOSPADM

## 2024-05-09 RX ORDER — ONDANSETRON 2 MG/ML
4 INJECTION INTRAMUSCULAR; INTRAVENOUS EVERY 6 HOURS PRN
Status: DISCONTINUED | OUTPATIENT
Start: 2024-05-09 | End: 2024-05-11 | Stop reason: HOSPADM

## 2024-05-09 RX ORDER — MIDAZOLAM HYDROCHLORIDE 2 MG/2ML
2 INJECTION, SOLUTION INTRAMUSCULAR; INTRAVENOUS
Status: DISCONTINUED | OUTPATIENT
Start: 2024-05-09 | End: 2024-05-09 | Stop reason: HOSPADM

## 2024-05-09 RX ORDER — FUROSEMIDE 40 MG/1
40 TABLET ORAL DAILY
Status: DISCONTINUED | OUTPATIENT
Start: 2024-05-09 | End: 2024-05-11 | Stop reason: HOSPADM

## 2024-05-09 RX ORDER — SODIUM CHLORIDE, SODIUM LACTATE, POTASSIUM CHLORIDE, CALCIUM CHLORIDE 600; 310; 30; 20 MG/100ML; MG/100ML; MG/100ML; MG/100ML
INJECTION, SOLUTION INTRAVENOUS CONTINUOUS
Status: DISCONTINUED | OUTPATIENT
Start: 2024-05-09 | End: 2024-05-09 | Stop reason: HOSPADM

## 2024-05-09 RX ORDER — FENTANYL CITRATE 50 UG/ML
INJECTION, SOLUTION INTRAMUSCULAR; INTRAVENOUS PRN
Status: DISCONTINUED | OUTPATIENT
Start: 2024-05-09 | End: 2024-05-09 | Stop reason: SDUPTHER

## 2024-05-09 RX ORDER — SODIUM CHLORIDE 0.9 % (FLUSH) 0.9 %
5-40 SYRINGE (ML) INJECTION PRN
Status: DISCONTINUED | OUTPATIENT
Start: 2024-05-09 | End: 2024-05-09 | Stop reason: HOSPADM

## 2024-05-09 RX ORDER — ACETAMINOPHEN 325 MG/1
650 TABLET ORAL ONCE
Status: COMPLETED | OUTPATIENT
Start: 2024-05-09 | End: 2024-05-09

## 2024-05-09 RX ORDER — DOXAZOSIN 2 MG/1
4 TABLET ORAL DAILY
Status: DISCONTINUED | OUTPATIENT
Start: 2024-05-09 | End: 2024-05-11 | Stop reason: HOSPADM

## 2024-05-09 RX ORDER — FUROSEMIDE 10 MG/ML
INJECTION INTRAMUSCULAR; INTRAVENOUS PRN
Status: DISCONTINUED | OUTPATIENT
Start: 2024-05-09 | End: 2024-05-09 | Stop reason: SDUPTHER

## 2024-05-09 RX ORDER — SODIUM CHLORIDE 9 MG/ML
INJECTION, SOLUTION INTRAVENOUS PRN
Status: DISCONTINUED | OUTPATIENT
Start: 2024-05-09 | End: 2024-05-09 | Stop reason: HOSPADM

## 2024-05-09 RX ORDER — LABETALOL HYDROCHLORIDE 5 MG/ML
10 INJECTION, SOLUTION INTRAVENOUS
Status: DISCONTINUED | OUTPATIENT
Start: 2024-05-09 | End: 2024-05-09 | Stop reason: HOSPADM

## 2024-05-09 RX ORDER — SODIUM CHLORIDE 9 MG/ML
INJECTION, SOLUTION INTRAVENOUS CONTINUOUS
Status: DISCONTINUED | OUTPATIENT
Start: 2024-05-09 | End: 2024-05-09 | Stop reason: HOSPADM

## 2024-05-09 RX ORDER — SODIUM CHLORIDE 0.9 % (FLUSH) 0.9 %
5-40 SYRINGE (ML) INJECTION EVERY 12 HOURS SCHEDULED
Status: DISCONTINUED | OUTPATIENT
Start: 2024-05-09 | End: 2024-05-11 | Stop reason: HOSPADM

## 2024-05-09 RX ORDER — NALOXONE HYDROCHLORIDE 0.4 MG/ML
INJECTION, SOLUTION INTRAMUSCULAR; INTRAVENOUS; SUBCUTANEOUS PRN
Status: DISCONTINUED | OUTPATIENT
Start: 2024-05-09 | End: 2024-05-09 | Stop reason: HOSPADM

## 2024-05-09 RX ORDER — POTASSIUM CHLORIDE 750 MG/1
20 TABLET, FILM COATED, EXTENDED RELEASE ORAL DAILY
Status: DISCONTINUED | OUTPATIENT
Start: 2024-05-09 | End: 2024-05-11 | Stop reason: HOSPADM

## 2024-05-09 RX ORDER — FENTANYL CITRATE 50 UG/ML
25 INJECTION, SOLUTION INTRAMUSCULAR; INTRAVENOUS EVERY 5 MIN PRN
Status: DISCONTINUED | OUTPATIENT
Start: 2024-05-09 | End: 2024-05-09 | Stop reason: HOSPADM

## 2024-05-09 RX ORDER — SODIUM CHLORIDE, SODIUM LACTATE, POTASSIUM CHLORIDE, CALCIUM CHLORIDE 600; 310; 30; 20 MG/100ML; MG/100ML; MG/100ML; MG/100ML
INJECTION, SOLUTION INTRAVENOUS CONTINUOUS PRN
Status: DISCONTINUED | OUTPATIENT
Start: 2024-05-09 | End: 2024-05-09 | Stop reason: SDUPTHER

## 2024-05-09 RX ORDER — POLYETHYLENE GLYCOL 3350 17 G/17G
17 POWDER, FOR SOLUTION ORAL DAILY PRN
Status: DISCONTINUED | OUTPATIENT
Start: 2024-05-09 | End: 2024-05-11 | Stop reason: HOSPADM

## 2024-05-09 RX ORDER — PROCHLORPERAZINE EDISYLATE 5 MG/ML
5 INJECTION INTRAMUSCULAR; INTRAVENOUS
Status: DISCONTINUED | OUTPATIENT
Start: 2024-05-09 | End: 2024-05-09 | Stop reason: HOSPADM

## 2024-05-09 RX ORDER — LISINOPRIL 20 MG/1
40 TABLET ORAL DAILY
Status: DISCONTINUED | OUTPATIENT
Start: 2024-05-09 | End: 2024-05-10

## 2024-05-09 RX ORDER — ROCURONIUM BROMIDE 10 MG/ML
INJECTION, SOLUTION INTRAVENOUS PRN
Status: DISCONTINUED | OUTPATIENT
Start: 2024-05-09 | End: 2024-05-09 | Stop reason: SDUPTHER

## 2024-05-09 RX ORDER — HYDROMORPHONE HYDROCHLORIDE 1 MG/ML
0.5 INJECTION, SOLUTION INTRAMUSCULAR; INTRAVENOUS; SUBCUTANEOUS EVERY 5 MIN PRN
Status: DISCONTINUED | OUTPATIENT
Start: 2024-05-09 | End: 2024-05-09 | Stop reason: HOSPADM

## 2024-05-09 RX ORDER — ACETAMINOPHEN 325 MG/1
650 TABLET ORAL EVERY 4 HOURS PRN
Status: DISCONTINUED | OUTPATIENT
Start: 2024-05-09 | End: 2024-05-11 | Stop reason: HOSPADM

## 2024-05-09 RX ORDER — SODIUM CHLORIDE 0.9 % (FLUSH) 0.9 %
5-40 SYRINGE (ML) INJECTION PRN
Status: DISCONTINUED | OUTPATIENT
Start: 2024-05-09 | End: 2024-05-11 | Stop reason: HOSPADM

## 2024-05-09 RX ORDER — LIDOCAINE HYDROCHLORIDE 10 MG/ML
1 INJECTION, SOLUTION EPIDURAL; INFILTRATION; INTRACAUDAL; PERINEURAL
Status: DISCONTINUED | OUTPATIENT
Start: 2024-05-09 | End: 2024-05-09 | Stop reason: HOSPADM

## 2024-05-09 RX ORDER — HEPARIN SODIUM 1000 [USP'U]/ML
INJECTION, SOLUTION INTRAVENOUS; SUBCUTANEOUS PRN
Status: DISCONTINUED | OUTPATIENT
Start: 2024-05-09 | End: 2024-05-09 | Stop reason: SDUPTHER

## 2024-05-09 RX ORDER — ONDANSETRON 2 MG/ML
4 INJECTION INTRAMUSCULAR; INTRAVENOUS
Status: DISCONTINUED | OUTPATIENT
Start: 2024-05-09 | End: 2024-05-09 | Stop reason: HOSPADM

## 2024-05-09 RX ORDER — FINASTERIDE 5 MG/1
5 TABLET, FILM COATED ORAL DAILY
Status: DISCONTINUED | OUTPATIENT
Start: 2024-05-09 | End: 2024-05-11 | Stop reason: HOSPADM

## 2024-05-09 RX ORDER — OXYCODONE HYDROCHLORIDE 5 MG/1
5 TABLET ORAL
Status: DISCONTINUED | OUTPATIENT
Start: 2024-05-09 | End: 2024-05-09 | Stop reason: HOSPADM

## 2024-05-09 RX ORDER — PROTAMINE SULFATE 10 MG/ML
INJECTION, SOLUTION INTRAVENOUS PRN
Status: DISCONTINUED | OUTPATIENT
Start: 2024-05-09 | End: 2024-05-09 | Stop reason: SDUPTHER

## 2024-05-09 RX ORDER — ROSUVASTATIN CALCIUM 40 MG/1
40 TABLET, COATED ORAL NIGHTLY
Status: DISCONTINUED | OUTPATIENT
Start: 2024-05-09 | End: 2024-05-11 | Stop reason: HOSPADM

## 2024-05-09 RX ORDER — HYDRALAZINE HYDROCHLORIDE 20 MG/ML
10 INJECTION INTRAMUSCULAR; INTRAVENOUS EVERY 10 MIN PRN
Status: DISCONTINUED | OUTPATIENT
Start: 2024-05-09 | End: 2024-05-11 | Stop reason: HOSPADM

## 2024-05-09 RX ORDER — FENTANYL CITRATE 50 UG/ML
25 INJECTION, SOLUTION INTRAMUSCULAR; INTRAVENOUS
Status: DISCONTINUED | OUTPATIENT
Start: 2024-05-09 | End: 2024-05-09 | Stop reason: HOSPADM

## 2024-05-09 RX ORDER — ONDANSETRON 2 MG/ML
INJECTION INTRAMUSCULAR; INTRAVENOUS PRN
Status: DISCONTINUED | OUTPATIENT
Start: 2024-05-09 | End: 2024-05-09 | Stop reason: SDUPTHER

## 2024-05-09 RX ORDER — LIDOCAINE HYDROCHLORIDE 20 MG/ML
INJECTION, SOLUTION EPIDURAL; INFILTRATION; INTRACAUDAL; PERINEURAL PRN
Status: DISCONTINUED | OUTPATIENT
Start: 2024-05-09 | End: 2024-05-09 | Stop reason: SDUPTHER

## 2024-05-09 RX ORDER — PHENYLEPHRINE HCL IN 0.9% NACL 0.4MG/10ML
SYRINGE (ML) INTRAVENOUS PRN
Status: DISCONTINUED | OUTPATIENT
Start: 2024-05-09 | End: 2024-05-09 | Stop reason: SDUPTHER

## 2024-05-09 RX ADMIN — CARVEDILOL 25 MG: 12.5 TABLET, FILM COATED ORAL at 20:29

## 2024-05-09 RX ADMIN — SODIUM CHLORIDE, POTASSIUM CHLORIDE, SODIUM LACTATE AND CALCIUM CHLORIDE: 600; 310; 30; 20 INJECTION, SOLUTION INTRAVENOUS at 07:26

## 2024-05-09 RX ADMIN — SUGAMMADEX 200 MG: 100 INJECTION, SOLUTION INTRAVENOUS at 11:36

## 2024-05-09 RX ADMIN — SODIUM CHLORIDE, POTASSIUM CHLORIDE, SODIUM LACTATE AND CALCIUM CHLORIDE: 600; 310; 30; 20 INJECTION, SOLUTION INTRAVENOUS at 06:44

## 2024-05-09 RX ADMIN — ONDANSETRON 4 MG: 2 INJECTION INTRAMUSCULAR; INTRAVENOUS at 11:22

## 2024-05-09 RX ADMIN — Medication 80 MCG: at 07:50

## 2024-05-09 RX ADMIN — ROCURONIUM BROMIDE 50 MG: 10 SOLUTION INTRAVENOUS at 08:21

## 2024-05-09 RX ADMIN — PROTAMINE SULFATE 2 MG: 10 INJECTION, SOLUTION INTRAVENOUS at 11:23

## 2024-05-09 RX ADMIN — PROTAMINE SULFATE 2 MG: 10 INJECTION, SOLUTION INTRAVENOUS at 11:27

## 2024-05-09 RX ADMIN — HEPARIN SODIUM 5000 UNITS: 1000 INJECTION, SOLUTION INTRAVENOUS; SUBCUTANEOUS at 08:22

## 2024-05-09 RX ADMIN — DOXAZOSIN 4 MG: 2 TABLET ORAL at 15:44

## 2024-05-09 RX ADMIN — SODIUM CHLORIDE, PRESERVATIVE FREE 10 ML: 5 INJECTION INTRAVENOUS at 20:30

## 2024-05-09 RX ADMIN — ROCURONIUM BROMIDE 10 MG: 10 SOLUTION INTRAVENOUS at 09:49

## 2024-05-09 RX ADMIN — FINASTERIDE 5 MG: 5 TABLET, FILM COATED ORAL at 15:45

## 2024-05-09 RX ADMIN — SODIUM CHLORIDE, SODIUM LACTATE, POTASSIUM CHLORIDE, CALCIUM CHLORIDE: 600; 310; 30; 20 INJECTION, SOLUTION INTRAVENOUS at 07:26

## 2024-05-09 RX ADMIN — PROPOFOL 100 MG: 10 INJECTION, EMULSION INTRAVENOUS at 07:42

## 2024-05-09 RX ADMIN — LIDOCAINE HYDROCHLORIDE 50 MG: 20 INJECTION, SOLUTION EPIDURAL; INFILTRATION; INTRACAUDAL; PERINEURAL at 07:42

## 2024-05-09 RX ADMIN — WATER 2000 MG: 1 INJECTION INTRAMUSCULAR; INTRAVENOUS; SUBCUTANEOUS at 07:53

## 2024-05-09 RX ADMIN — PROTAMINE SULFATE 2 MG: 10 INJECTION, SOLUTION INTRAVENOUS at 11:26

## 2024-05-09 RX ADMIN — ROCURONIUM BROMIDE 10 MG: 10 SOLUTION INTRAVENOUS at 10:19

## 2024-05-09 RX ADMIN — HEPARIN SODIUM 3000 UNITS: 1000 INJECTION, SOLUTION INTRAVENOUS; SUBCUTANEOUS at 09:22

## 2024-05-09 RX ADMIN — ACETAMINOPHEN 650 MG: 325 TABLET ORAL at 06:49

## 2024-05-09 RX ADMIN — PROTAMINE SULFATE 2 MG: 10 INJECTION, SOLUTION INTRAVENOUS at 11:24

## 2024-05-09 RX ADMIN — CARVEDILOL 25 MG: 12.5 TABLET, FILM COATED ORAL at 15:45

## 2024-05-09 RX ADMIN — HEPARIN SODIUM 2000 UNITS: 1000 INJECTION, SOLUTION INTRAVENOUS; SUBCUTANEOUS at 10:21

## 2024-05-09 RX ADMIN — LISINOPRIL 40 MG: 20 TABLET ORAL at 15:44

## 2024-05-09 RX ADMIN — ROCURONIUM BROMIDE 10 MG: 10 SOLUTION INTRAVENOUS at 10:59

## 2024-05-09 RX ADMIN — HEPARIN SODIUM 5000 UNITS: 1000 INJECTION, SOLUTION INTRAVENOUS; SUBCUTANEOUS at 08:32

## 2024-05-09 RX ADMIN — POTASSIUM CHLORIDE 20 MEQ: 750 TABLET, FILM COATED, EXTENDED RELEASE ORAL at 15:45

## 2024-05-09 RX ADMIN — PROTAMINE SULFATE 2 MG: 10 INJECTION, SOLUTION INTRAVENOUS at 11:22

## 2024-05-09 RX ADMIN — FUROSEMIDE 40 MG: 10 INJECTION, SOLUTION INTRAMUSCULAR; INTRAVENOUS at 11:30

## 2024-05-09 RX ADMIN — ROCURONIUM BROMIDE 50 MG: 10 SOLUTION INTRAVENOUS at 07:42

## 2024-05-09 RX ADMIN — FENTANYL CITRATE 25 MCG: 50 INJECTION, SOLUTION INTRAMUSCULAR; INTRAVENOUS at 07:42

## 2024-05-09 RX ADMIN — FUROSEMIDE 40 MG: 40 TABLET ORAL at 15:45

## 2024-05-09 RX ADMIN — PHENYLEPHRINE HYDROCHLORIDE 40 MCG/MIN: 10 INJECTION INTRAVENOUS at 07:46

## 2024-05-09 RX ADMIN — ROSUVASTATIN 40 MG: 40 TABLET, FILM COATED ORAL at 20:29

## 2024-05-09 RX ADMIN — Medication 80 MCG: at 07:52

## 2024-05-09 RX ADMIN — PROTAMINE SULFATE 2 MG: 10 INJECTION, SOLUTION INTRAVENOUS at 11:25

## 2024-05-09 ASSESSMENT — PAIN - FUNCTIONAL ASSESSMENT: PAIN_FUNCTIONAL_ASSESSMENT: 0-10

## 2024-05-09 NOTE — CONSULTS
Dr. Alejandro Llanes [facility] Card[facility].  [phone]            Structural Cardiology Consult/Progress Note      Brent Sabillon Jr.    Requesting/referring provider: DR. Rico    Reason for Consult: Mitral regurgitation    Assessment/Plan:    Severe mitral regurgitation, partial flail of posterior leaflet, ruptured chordae  Long-term persistent atrial fibrillation  Chronic anticoagulation: Xarelto   Primary hypertension  CKD2-3    Brent Sabillon Jr. is seen at the request of Dr. Rico for evaluation of mitral valve disease.  He has  history of recurrent persistent atrial fibrillation and now seems to present with worsening shortness of breath likely secondary to subacute flail mitral valve.  I suspect this was causing significant shortness of breath for the past few months which was managed as possible pneumonia/heart failure.    He underwent a transesophageal echocardiogram which demonstrated severe mitral regurgitation with flail segment along P2.  He has severely enlarged left atrium.  He was evaluated in multidisciplinary case conference and after discussion with cardiothoracic surgery division it was felt that he would be best treated with transcatheter pinx-pi-yqat repair of the mitral valve.  He has severe to profound mitral regurgitation and for symptom improvement if this could be reduced by at least 2 grades, he should notice improvement in his symptoms.    I discussed the risks/benefits/alternatives of the procedure with the patient. Risks include (but are not limited to) bleeding, infection,stroke,heart attack, need for emergency surgery/pericardiocentesis, need for dialysis or death. The patient understands and agrees to proceed.        []    High complexity decision making was performed  []    Patient is at high-risk of decompensation with multiple organ involvement  []    Complex/difficult social determinants of health outcomes  Total of ** minutes were spent on  images were not obtained. LV perfusion is normal.    Perfusion Conclusion: There is no evidence of transient ischemic dilation (TID). TID ratio is 1.11.    Image quality is good.    ECG: Resting ECG demonstrates normal sinus rhythm.    ECG: The ECG was negative for ischemia.    Stress Test: A pharmacological stress test was performed using lexiscan. 100 mg of aminophylline given as a reversal agent. Hemodynamics are adequate for diagnosis. Blood pressure demonstrated a normal response and heart rate demonstrated a normal response to stress. The patient's heart rate recovery was normal.    Signed by: José Antonio Rico MD on 3/7/2024  4:07 PM      No results found for this or any previous visit.     Patient Active Problem List   Diagnosis    Chronic a-fib (HCC)    Chronic anticoagulation    History of tobacco use    Malignant hypertension    CKD (chronic kidney disease)    Essential hypertension    Unstable angina (HCC)    Severe mitral regurgitation    Mitral regurgitation    Acute on chronic heart failure with preserved ejection fraction (HCC)       No Known Allergies    No current facility-administered medications on file prior to encounter.     Current Outpatient Medications on File Prior to Encounter   Medication Sig Dispense Refill    furosemide (LASIX) 40 MG tablet Take 2 tablets by mouth daily (Patient taking differently: Take 1 tablet by mouth daily) 60 tablet 2    doxazosin (CARDURA) 4 MG tablet Take 1 tablet by mouth daily 30 tablet 3    carvedilol (COREG) 25 MG tablet Take 1 tablet by mouth 2 times daily      rivaroxaban (XARELTO) 15 MG TABS tablet Take 1 tablet by mouth Daily with supper      bimatoprost (LUMIGAN) 0.01 % SOLN ophthalmic drops Place 1 drop into the right eye nightly      brimonidine (ALPHAGAN P) 0.1 % SOLN Place 1 drop into the right eye daily      dorzolamide (TRUSOPT) 2 % ophthalmic solution Place 1 drop into the right eye 3 times daily      finasteride (PROSCAR) 5 MG tablet Take 1 tablet by

## 2024-05-09 NOTE — PROGRESS NOTES
Cardiac Surgery Coordinator- Met with the family of Brent TONE Sabillon Jr., introduced role of the Cardiac Surgery Care Coordinator.  Reviewed plan of care and day of procedure expectations. Provided family with an update from OR.  Encouraged family to verbalize and emotional support given.  Will continue to update throughout the day.

## 2024-05-09 NOTE — PROGRESS NOTES
Patient complaining of urinating and \"wetting the bed.\" Assessed randle catheter and patient was noted to have blood around catheter with bed saturated with new blood from randle. Cleaned patient up and called Destiny Wolfe about above situation. Telephone orders rec'd for PRN irrigation of randle catheter. NP to assess patient once on CVSU. Room number provided.

## 2024-05-09 NOTE — PERIOP NOTE
TRANSFER - OUT REPORT:    Verbal report given to Patrick(name) on Brent D Ridge Jr.  being transferred to [facility](unit) for routine post-op       Report consisted of patient’s Situation, Background, Assessment and   Recommendations(SBAR).     Time Pre op antibiotic given:0753  Anesthesia Stop time: 1159    Information from the following report(s) SBAR, OR Summary, MAR, Accordion, and Recent Results was reviewed with the receiving nurse.    Opportunity for questions and clarification was provided.     Is the patient on 02? Yes       L/Min 2       Other     Is the patient on a monitor? Yes    Is the nurse transporting with the patient? Yes    Surgical Waiting Area notified of patient's transfer from PACU? Yes    Pt wearing glasses.   Clothes also to floor with pt

## 2024-05-09 NOTE — ANESTHESIA PROCEDURE NOTES
Arterial Line:    An arterial line was placed using ultrasound guidance, in the pre-op for the following indication(s): continuous blood pressure monitoring and blood sampling needed.    A 20 gauge (size), 1 and 3/8 inch (length), Arrow (type) catheter was placed, Seldinger technique used, into the right radial artery, secured by tape and Tegaderm.  Anesthesia type: Local  Local infiltration: Injection    Events:  patient tolerated procedure well with no complications.5/9/2024 6:55 AM5/9/2024 7:01 AM  Performed: Anesthesiologist   Preanesthetic Checklist  Completed: patient identified, IV checked, site marked, risks and benefits discussed, surgical/procedural consents, equipment checked, pre-op evaluation, timeout performed, anesthesia consent given, oxygen available and monitors applied/VS acknowledged

## 2024-05-09 NOTE — H&P
Cardiac Surgery   History and Physical    Subjective:      Brent Sabillon Jr. is a 79 y.o. male  with PMHx of Mitral Regurgitation, HTN, CKD, Chronic Afib, that is [facility] by Dr. Allen for interventional evaluation of  Mitral Regurgitation.     He notes shortness of breath mostly when he is in bed. He has orthopnea and notes episodes of PND.  He could probably walk 1/2 block before he would need to stop and rest. He feels fatigued daily.  He has mild LE edema. He was admitted to the hospital with HF symptoms in October and December of 2023.  He denies palpitations, CP, dizziness, syncope, fall, medication changes in the last 3 months, GIB or Hospitalization in the last 2 weeks.       He is  and his wife can help following a surgery. He is completely independent in his ADLs. He does not smoke, drink alcohol, or use recreational drugs.     Cardiac Testing    TTE:  3/7/24     Interpretation Summary          Left Ventricle: Normal left ventricular systolic function with a visually estimated EF of 55 - 60%. Left ventricle size is normal. Increased wall thickness. Findings consistent with concentric hypertrophy. Normal wall motion in setting of arrhythmia    Mitral Valve: Flail posterior leaflet with a single ruptured chord. Severe regurgitation with a posterior directed jet.    Tricuspid Valve: Moderate to severe regurgitation. Mildly elevated RVSP, consistent with mild pulmonary hypertension. The estimated RVSP is 51 mmHg.    Aorta: Mildly dilated ascending aorta. Ao ascending diameter is 4.1 cm.     Comparison Study Information     Prior Study     There is no prior study available for comparison      Echo Findings     Left Ventricle Normal left ventricular systolic function with a visually estimated EF of 55 - 60%. EF by 2D Simpsons Biplane is 53%. Left ventricle size is normal. Increased wall thickness. Findings consistent with concentric hypertrophy. Normal wall  motion in setting of arrhythmia Indeterminate diastolic function due to arrhythmia and atrial fibrillation.   Left Atrium Left atrium is severely dilated. Left atrial volume index is severely increased (>48 mL/m2).   Interatrial Septum No interatrial shunt visualized with color Doppler. The septum is bowing into the RA.   Right Ventricle Right ventricle size is normal. Normal systolic function.   Right Atrium Right atrium size is normal.   Aortic Valve Trileaflet valve. No regurgitation. No significant stenosis.   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 Flail posterior leaflet with a single ruptured chord. Severe regurgitation with a posterior directed jet. No stenosis noted.   Tricuspid Valve Valve structure is normal. Moderate to severe regurgitation. No stenosis noted. Mildly elevated RVSP, consistent with mild pulmonary hypertension. The estimated RVSP is 51 mmHg.   Pulmonic Valve Valve structure is normal. No regurgitation. No stenosis noted.   Aorta Dilated aortic root. Ao root diameter is 4.2 cm. Mildly dilated ascending aorta. Ao ascending diameter is 4.1 cm.   IVC/Hepatic Veins IVC diameter is less than or equal to 21 mm and decreases greater than 50% during inspiration; therefore the estimated right atrial pressure is normal (~3 mmHg). IVC size is normal.   Pericardium No pericardial effusion.            03/25/24     MARTIN (PRN CONTRAST/BUBBLE/3D) 03/27/2024  4:43 PM (Final)     Interpretation Summary    Left Ventricle: Normal left ventricular systolic function. Left ventricle size is normal. Mildly increased wall thickness.    Mitral Valve: Findings consistent with myxomatous degeneration.There appears to be posterior leaflet prolapse extending from P1 to P2 Moderate to severe regurgitation.  By ERO based calculation mitral regurgitation appears to be severe but it may be overestimated.  By color, MR appears to be moderate to severe and is likely secondary to posterior leaflet

## 2024-05-09 NOTE — ANESTHESIA PRE PROCEDURE
Department of Anesthesiology  Preprocedure Note       Name:  Brent Sabillon Jr.   Age:  79 y.o.  :  1945                                          MRN:  [id number]         Date:  2024      Surgeon: Surgeon(s):  Radha Allen MD    Procedure: Procedure(s):  TRANSCATHETER EDGE TO EDGE REPAIR    Medications prior to admission:   Prior to Admission medications    Medication Sig Start Date End Date Taking? Authorizing Provider   KLOR-CON M20 20 MEQ extended release tablet Take 1 tab (20 meq) Monday, Wednesday, Friday. Take 2 tabs (40 meq) all other days.  Patient taking differently: Take 1 tablet by mouth daily Take 1 tab (20 meq) Monday, Wednesday, Friday. Take 2 tabs (40 meq) all other days. 24   José Antonio Rico MD   furosemide (LASIX) 40 MG tablet Take 2 tablets by mouth daily  Patient taking differently: Take 1 tablet by mouth daily 4/4/24 7/3/24  Destiny Wolfe APRN - NP   doxazosin (CARDURA) 4 MG tablet Take 1 tablet by mouth daily 24   Destiny Wolfe APRN - NP   carvedilol (COREG) 25 MG tablet Take 1 tablet by mouth 2 times daily 3/8/24   ProviderCitlaly MD   rivaroxaban (XARELTO) 15 MG TABS tablet Take 1 tablet by mouth Daily with supper 11/3/23   ProviderCitlaly MD   bimatoprost (LUMIGAN) 0.01 % SOLN ophthalmic drops Place 1 drop into the right eye nightly    Automatic Reconciliation, Ar   brimonidine (ALPHAGAN P) 0.1 % SOLN Place 1 drop into the right eye daily    Automatic Reconciliation, Ar   dorzolamide (TRUSOPT) 2 % ophthalmic solution Place 1 drop into the right eye 3 times daily    Automatic Reconciliation, Ar   finasteride (PROSCAR) 5 MG tablet Take 1 tablet by mouth daily ceived the following from [facility]: Outside name: finasteride (PROSCAR) 5 mg tablet 21   Automatic Reconciliation, Ar   lisinopril (PRINIVIL;ZESTRIL) 40 MG tablet Take 1 tablet by mouth daily    Automatic Reconciliation, Ar   rosuvastatin (CRESTOR) 20 MG tablet Take 2

## 2024-05-09 NOTE — PROGRESS NOTES
1445: Patient transferred in from PACU. RN received report from KALIN Sandoval. Patient assessed and vitals obtained upon arrival to unit. R groin site CDI. Blood was draining around randle.      1452: Paged Yaneth NP, about pt bleeding from catheter site. Irrigated randle and it leaked around the randle. Orders in for urology consult.     1530: R groin site bleeding, pressure applied for 5 minutes quick clot applied. New dressing CDI.     1547: MD Choi called and said he will be here in 7-8 minutes to place randle.    1600: MD Choi at bedside. 16 South African randle removed. 3 way 20 South African placed. Per MD Choi, it's okay if some fluid comes out around randle because of bladder spasms.     1800: pts pad saturated, changed pad and bathed pt.     1900: pts pad CDI.     1930: Bedside and Verbal shift change report given to Gurinder GAGNON (oncoming nurse) by KALIN Nguyen (offgoing nurse). Report included the following information Nurse Handoff Report, Index, Intake/Output, MAR, and Cardiac Rhythm AFIB .

## 2024-05-09 NOTE — ANESTHESIA PROCEDURE NOTES
Procedure Performed: MARTIN       Start Time:  5/9/2024 8:00 AM       End Time:   5/9/2024 11:26 AM    Preanesthesia Checklist:  Patient identified, IV assessed, risks and benefits discussed, monitors and equipment assessed, procedure being performed at surgeon's request and anesthesia consent obtained.    General Procedure Information  Diagnostic Indications for Echo:  assessment of surgical repair and assessment of valve function  Location performed:  OR  Intubated  Bite block not placed  Heart visualized  Probe Insertion:  Easy  Probe Type:  3D and mulitplane  Modalities:  2D, 3D, pulse wave Doppler, color flow mapping and continuous wave Doppler    Echocardiographic and Doppler Measurements    Ventricles    Right Ventricle:  Cavity size dilated.  Hypertrophy not present.  Thrombus not present.  Global function normal.    Left Ventricle:  Cavity size normal.  Hypertrophy not present.  Thrombus not present.  Global Function normal.  Ejection Fraction 55%.      Ventricular Regional Function:  1- Basal Anteroseptal:  normal  2- Basal Anterior:  normal  3- Basal Anterolateral:  normal  4- Basal Inferolateral:  normal  5- Basal Inferior:  normal  6- Basal Inferoseptal:  normal  7- Mid Anteroseptal:  normal  8- Mid Anterior:  normal  9- Mid Anterolateral:  normal  10- Mid Inferolateral:  normal  11- Mid Inferior:  normal  12- Mid Inferoseptal:  normal  13- Apical Anterior:  normal  14- Apical Lateral:  normal  15- Apical Inferior:  normal  16- Apical Septal:  normal  17- Edmond:  normal      Valves    Aortic Valve:  Annulus normal.  Stenosis not present.  Regurgitation none.  Leaflets normal.  Leaflet motions normal.      Mitral Valve:  Annulus dilated.  Stenosis not present.  Regurgitation severe.  Leaflets myxomatous.  Leaflet motions flail.  Specific leaflet segments with abnormal motions are described in the following comments:       P1-2    Tricuspid Valve:  Annulus dilated.  Stenosis not present.  Regurgitation

## 2024-05-09 NOTE — ANESTHESIA POSTPROCEDURE EVALUATION
Department of Anesthesiology  Postprocedure Note    Patient: Brent Sabillon Jr.  MRN: [id number]  YOB: 1945  Date of evaluation: 5/9/2024    Procedure Summary       Date: 05/09/24 Room / Location: [facility] HEART OR [address] [place] [address] OPEN HEART    Anesthesia Start: 0726 Anesthesia Stop: 1159    Procedure: TRANSCATHETER EDGE TO EDGE REPAIR (Groin) Diagnosis:       Mitral regurgitation      (Mitral regurgitation [I34.0])    Providers: Radha Allen MD Responsible Provider: Tony Shepherd MD    Anesthesia Type: general ASA Status: 4            Anesthesia Type: No value filed.    Ld Phase I: Ld Score: 9    Ld Phase II:      Anesthesia Post Evaluation  Post-Anesthesia Evaluation and Assessment    Patient: Brent Sabillon Jr. MRN: [id number]  SSN: [id number]    YOB: 1945  Age: 79 y.o.  Sex: male      I have evaluated the patient and they are stable and ready for discharge from the PACU.     Cardiovascular Function/Vital Signs  Visit Vitals  BP (!) 148/75   Pulse 56   Temp 98.2 °F (36.8 °C) (Oral)   Resp 14   Ht 1.778 m (5' 10\")   Wt 81.1 kg (178 lb 12.7 oz)   SpO2 97%   BMI 25.65 kg/m²       Patient is status post General anesthesia for Procedure(s):  TRANSCATHETER EDGE TO EDGE REPAIR.    Nausea/Vomiting: None    Postoperative hydration reviewed and adequate.    Pain:      Managed    Neurological Status:       At baseline    Mental Status, Level of Consciousness: Alert and  oriented to person, place, and time    Pulmonary Status:       Adequate oxygenation and airway patent    Complications related to anesthesia: None    Post-anesthesia assessment completed. No concerns    Signed By: Tony Shepherd MD     May 9, 2024                No notable events documented.

## 2024-05-09 NOTE — OP NOTE
Operative Note      Mitral Clip Procedure  Indication:  The patient was found to have moderate to severe degenerative mitral regurgitation in a patient at high surgical risk due to comorbid conditions. He had NYHA class IVs symtoms. Patient was evaluated by the heart fine and  transcatheter edge to edge repair was felt to be the preferred option.     After informed consent was obtained from the patient, the patient was brought in the fasting state to the catheterization laboratory. The patient was prepped and draped in the usual sterile fashion. The patient was sedated and anesthetized, and intubated by the anesthesia service.  Prophylactic antibiotic administration was done.  A transesophageal echocardiogram was performed.     Access  A 8 Fr sheath was placed in the right femoral vein for diagnostic right heart catheterization, obtaining hemodynamic evaluation of the pulmonary artery, wedge pressures.A transseptal puncture was performed using MARTIN and fluoroscopic guidance, and then systemic heparinization was given.     Procedure  Following the right heart catheterization and transseptal puncture, a 24 Cymraes steerable guide catheter was introduced into the left atrium. Next, percutaneous transcatheter mitral valve repair was performed using MitraClip system and guided by MARTIN. A XTW clip placed between A2/P2 resulting in substantial reduction in MR from 4+ to 3+. Due to residual MR another XTW  clip was placed  medial to the first clip with reduction in MT to mild to moderate. Final hemodynamics demonstrated mean transmitral graident of 4 mmHg.     Hemodynamics  The inflow gradient by echo post-MitraClip was 3-4 mmHg mean by MARTIN. Pre procedure mean LA pressure was 22. Post procedure mean LA presssure was 16.       Conclusion  1.        Mitral regurgitation, severe  2.        High surgical risk  3.        Successful reduction in mitral regurgitation with mitraclip based transcatheter edge to edge mitral valve

## 2024-05-10 ENCOUNTER — APPOINTMENT (OUTPATIENT)
Facility: HOSPITAL | Age: 79
End: 2024-05-10
Attending: INTERNAL MEDICINE
Payer: MEDICARE

## 2024-05-10 DIAGNOSIS — I34.0 MITRAL VALVE INSUFFICIENCY, UNSPECIFIED ETIOLOGY: Primary | ICD-10-CM

## 2024-05-10 PROBLEM — Z95.818 S/P MITRAL VALVE CLIP IMPLANTATION: Status: ACTIVE | Noted: 2024-05-10

## 2024-05-10 PROBLEM — Z98.890 S/P MITRAL VALVE CLIP IMPLANTATION: Status: ACTIVE | Noted: 2024-05-10

## 2024-05-10 LAB
ABO + RH BLD: NORMAL
ANION GAP SERPL CALC-SCNC: 7 MMOL/L (ref 5–15)
BLD PROD TYP BPU: NORMAL
BLOOD BANK DISPENSE STATUS: NORMAL
BLOOD GROUP ANTIBODIES SERPL: NORMAL
BPU ID: NORMAL
BUN SERPL-MCNC: 31 MG/DL (ref 6–20)
BUN/CREAT SERPL: 13 (ref 12–20)
CALCIUM SERPL-MCNC: 9 MG/DL (ref 8.5–10.1)
CHLORIDE SERPL-SCNC: 109 MMOL/L (ref 97–108)
CO2 SERPL-SCNC: 27 MMOL/L (ref 21–32)
CREAT SERPL-MCNC: 2.4 MG/DL (ref 0.7–1.3)
CROSSMATCH RESULT: NORMAL
ECHO BSA: 2 M2
ECHO EST RA PRESSURE: 9 MMHG
ECHO LA DIAMETER INDEX: 3.38 CM/M2
ECHO LA DIAMETER: 6.8 CM
ECHO LA VOL A-L A4C: 224 ML (ref 18–58)
ECHO LA VOL MOD A4C: 217 ML (ref 18–58)
ECHO LA VOLUME INDEX A-L A4C: 111 ML/M2 (ref 16–34)
ECHO LA VOLUME INDEX MOD A4C: 108 ML/M2 (ref 16–34)
ECHO LV FRACTIONAL SHORTENING: 34 % (ref 28–44)
ECHO LV INTERNAL DIMENSION DIASTOLE INDEX: 2.34 CM/M2
ECHO LV INTERNAL DIMENSION DIASTOLIC: 4.7 CM (ref 4.2–5.9)
ECHO LV INTERNAL DIMENSION SYSTOLIC INDEX: 1.54 CM/M2
ECHO LV INTERNAL DIMENSION SYSTOLIC: 3.1 CM
ECHO LV IVSD: 1.4 CM (ref 0.6–1)
ECHO LV MASS 2D: 224.8 G (ref 88–224)
ECHO LV MASS INDEX 2D: 111.8 G/M2 (ref 49–115)
ECHO LV POSTERIOR WALL DIASTOLIC: 1.1 CM (ref 0.6–1)
ECHO LV RELATIVE WALL THICKNESS RATIO: 0.47
ECHO LVOT PEAK GRADIENT: 4 MMHG
ECHO LVOT PEAK VELOCITY: 1 M/S
ECHO MV AREA PHT: 2 CM2
ECHO MV E VELOCITY: 1.08 M/S
ECHO MV EROA PISA: 0.3 CM2
ECHO MV MAX VELOCITY: 1.6 M/S
ECHO MV MEAN GRADIENT: 3 MMHG
ECHO MV MEAN VELOCITY: 0.7 M/S
ECHO MV PEAK GRADIENT: 11 MMHG
ECHO MV PRESSURE HALF TIME (PHT): 112.3 MS
ECHO MV REGURGITANT ALIASING (NYQUIST) VELOCITY: 37 CM/S
ECHO MV REGURGITANT PEAK VELOCITY: 4.8 M/S
ECHO MV REGURGITANT PEAK VELOCITY: 4.8 M/S
ECHO MV REGURGITANT RADIUS PISA: 0.76 CM
ECHO MV REGURGITANT VELOCITY PISA: 5 M/S
ECHO MV REGURGITANT VTIA: 162.3 CM
ECHO MV REGURGITANT VTIA: 162.8 CM
ECHO MV VTI: 40.4 CM
ECHO RIGHT VENTRICULAR SYSTOLIC PRESSURE (RVSP): 48 MMHG
ECHO RV INTERNAL DIMENSION: 6 CM
ECHO RV TAPSE: 2.1 CM (ref 1.7–?)
ECHO TV REGURGITANT MAX VELOCITY: 3.13 M/S
ECHO TV REGURGITANT PEAK GRADIENT: 39 MMHG
ERYTHROCYTE [DISTWIDTH] IN BLOOD BY AUTOMATED COUNT: 14.2 % (ref 11.5–14.5)
GLUCOSE SERPL-MCNC: 119 MG/DL (ref 65–100)
HCT VFR BLD AUTO: 38.3 % (ref 36.6–50.3)
HGB BLD-MCNC: 13.3 G/DL (ref 12.1–17)
MCH RBC QN AUTO: 29.4 PG (ref 26–34)
MCHC RBC AUTO-ENTMCNC: 34.7 G/DL (ref 30–36.5)
MCV RBC AUTO: 84.5 FL (ref 80–99)
NRBC # BLD: 0 K/UL (ref 0–0.01)
NRBC BLD-RTO: 0 PER 100 WBC
PLATELET # BLD AUTO: 127 K/UL (ref 150–400)
PMV BLD AUTO: 12 FL (ref 8.9–12.9)
POTASSIUM SERPL-SCNC: 3 MMOL/L (ref 3.5–5.1)
RBC # BLD AUTO: 4.53 M/UL (ref 4.1–5.7)
SODIUM SERPL-SCNC: 143 MMOL/L (ref 136–145)
SPECIMEN EXP DATE BLD: NORMAL
UNIT DIVISION: 0
WBC # BLD AUTO: 10.3 K/UL (ref 4.1–11.1)

## 2024-05-10 PROCEDURE — 85027 COMPLETE CBC AUTOMATED: CPT

## 2024-05-10 PROCEDURE — 6370000000 HC RX 637 (ALT 250 FOR IP): Performed by: NURSE PRACTITIONER

## 2024-05-10 PROCEDURE — 93306 TTE W/DOPPLER COMPLETE: CPT

## 2024-05-10 PROCEDURE — APPSS45 APP SPLIT SHARED TIME 31-45 MINUTES: Performed by: NURSE PRACTITIONER

## 2024-05-10 PROCEDURE — 36415 COLL VENOUS BLD VENIPUNCTURE: CPT

## 2024-05-10 PROCEDURE — 93306 TTE W/DOPPLER COMPLETE: CPT | Performed by: INTERNAL MEDICINE

## 2024-05-10 PROCEDURE — 99233 SBSQ HOSP IP/OBS HIGH 50: CPT | Performed by: INTERNAL MEDICINE

## 2024-05-10 PROCEDURE — 2580000003 HC RX 258: Performed by: NURSE PRACTITIONER

## 2024-05-10 PROCEDURE — 80048 BASIC METABOLIC PNL TOTAL CA: CPT

## 2024-05-10 PROCEDURE — 2060000000 HC ICU INTERMEDIATE R&B

## 2024-05-10 RX ORDER — LISINOPRIL 20 MG/1
20 TABLET ORAL DAILY
Status: DISCONTINUED | OUTPATIENT
Start: 2024-05-11 | End: 2024-05-11 | Stop reason: HOSPADM

## 2024-05-10 RX ORDER — ACETAMINOPHEN 325 MG/1
650 TABLET ORAL EVERY 4 HOURS PRN
Status: SHIPPED | COMMUNITY
Start: 2024-05-10

## 2024-05-10 RX ORDER — FUROSEMIDE 40 MG/1
40 TABLET ORAL DAILY
Qty: 30 TABLET | Refills: 2 | Status: SHIPPED
Start: 2024-05-10 | End: 2024-08-08

## 2024-05-10 RX ORDER — POTASSIUM CHLORIDE 750 MG/1
20 TABLET, FILM COATED, EXTENDED RELEASE ORAL ONCE
Status: COMPLETED | OUTPATIENT
Start: 2024-05-10 | End: 2024-05-10

## 2024-05-10 RX ADMIN — LISINOPRIL 40 MG: 20 TABLET ORAL at 09:08

## 2024-05-10 RX ADMIN — SODIUM CHLORIDE, PRESERVATIVE FREE 10 ML: 5 INJECTION INTRAVENOUS at 20:28

## 2024-05-10 RX ADMIN — SODIUM CHLORIDE, PRESERVATIVE FREE 10 ML: 5 INJECTION INTRAVENOUS at 09:10

## 2024-05-10 RX ADMIN — FUROSEMIDE 40 MG: 40 TABLET ORAL at 09:07

## 2024-05-10 RX ADMIN — DOXAZOSIN 4 MG: 2 TABLET ORAL at 09:08

## 2024-05-10 RX ADMIN — ROSUVASTATIN 40 MG: 40 TABLET, FILM COATED ORAL at 20:24

## 2024-05-10 RX ADMIN — POTASSIUM CHLORIDE 20 MEQ: 750 TABLET, FILM COATED, EXTENDED RELEASE ORAL at 09:08

## 2024-05-10 RX ADMIN — POTASSIUM CHLORIDE 20 MEQ: 750 TABLET, FILM COATED, EXTENDED RELEASE ORAL at 12:08

## 2024-05-10 RX ADMIN — FINASTERIDE 5 MG: 5 TABLET, FILM COATED ORAL at 09:08

## 2024-05-10 RX ADMIN — CARVEDILOL 25 MG: 12.5 TABLET, FILM COATED ORAL at 09:08

## 2024-05-10 NOTE — DISCHARGE SUMMARY
rosuvastatin 20 MG tablet  Commonly known as: CRESTOR     timolol 0.5 % ophthalmic solution  Commonly known as: TIMOPTIC               Where to Get Your Medications        These medications were sent to [facility]/pharmacy #[id number] - [place], [place] - [address] - P [phone] - F [phone] [address] [facility] [address]      Phone: [phone]   lisinopril 40 MG tablet       Information about where to get these medications is not yet available    Ask your nurse or doctor about these medications  acetaminophen 325 MG tablet  furosemide 40 MG tablet  rivaroxaban 15 MG Tabs tablet         HPI: Copied from H&P dated 5/9/24    Brent Sabillon Jr. is a 79 y.o. male  with PMHx of Mitral Regurgitation, HTN, CKD, Chronic Afib, that is [facility] by Dr. Allen for interventional evaluation of  Mitral Regurgitation.     He notes shortness of breath mostly when he is in bed. He has orthopnea and notes episodes of PND.  He could probably walk 1/2 block before he would need to stop and rest. He feels fatigued daily.  He has mild LE edema. He was admitted to the hospital with HF symptoms in October and December of 2023.  He denies palpitations, CP, dizziness, syncope, fall, medication changes in the last 3 months, GIB or Hospitalization in the last 2 weeks.       He is  and his wife can help following a surgery. He is completely independent in his ADLs. He does not smoke, drink alcohol, or use recreational drugs.     Hospital Course: Patient underwent Procedure(s):  TRANSCATHETER EDGE TO EDGE REPAIR of the mitral valve on 5/9/24 with Dr. Allen. Patient was transferred to the PACU in stable condition on no gtts. The patient's post operative course was complicated by hematuria requiring a Urology consultation. The patient was stable and ready for discharge on 2 with the following plan:    POD 2:   Mitral Regurgitation - s/p ARCHANA with Dr. Allen. Plan to continue  tortuous and atherosclerotic. There is  unchanged cardiomegaly. The pulmonary vasculature is within normal limits.    The lungs and pleural spaces are clear. The visualized bones and upper abdomen  are age-appropriate.    Impression  Unchanged cardiomegaly. No acute pulmonary process.      Patient Instructions/Follow Up Care:  Discharge instructions were reviewed with the patient and family present. Questions were also answered at this time. Prescriptions and medications were reviewed. The patient has a follow up appointment with the Nurse Practitioner or Physician Assistant on 5/15/24 and with MYESHA Mcguire NP  on 6/14/24. The patient was also instructed to follow up with his primary care physician as needed. The patient and family were encouraged to call with any questions or concerns.       Signed:  MYESHA Mcguire NP  5/11/2024  10:27 AM

## 2024-05-10 NOTE — DISCHARGE INSTRUCTIONS
Cardiac Surgery Specialist    [address] [facility] [address]       [address] [facility]      [place] [address]                                       [place] [address]  Office- [phone]  Fax- [phone]       Office- [phone]  Fax- [phone]  _____________________________________________________________  Dr. Jose Aguilar, NP    Sabrina Danielle, PADAKOTA Zaman, NP  Dr. Bal Montilla,RHODA Heard, NP  Dr. Markus Wolfe, RHODA Cuba, MARY Gleason, RHODA Mcwilliams, NP  ______________________________________________________________     Name:Brent Sabillon Jr.     Surgery & Date: Procedure(s):  TRANSCATHETER EDGE TO EDGE REPAIR of MV    Discharge Date: 05/11/24     MEDICATIONS:  Please refer to your After Visit Summary for your medication list.       DO NOT TAKE ANY MEDICATIONS THAT ARE NOT ON THIS LIST    INSTRUCTIONS:  NO SMOKING OR TOBACCO PRODUCTS  Do not follow the activity/exercise instructions in your discharge book given to you as an inpatient. You have no activity restrictions.   You may shower.  Wash all incisions twice daily with mild soap and water.  No lotions, ointments or powder.  Call the office immediately for any redness, swelling, or drainage from your incision.   Take your temperature daily and call for a temperature of 101 degrees or higher or for any symptoms that make you think you have and infection.  Weigh yourself each morning.  Call if you gain more than 5 pounds in 48 hours.  Use the incentive spirometer 6-8 times a day-10 breaths each time.    Walk several hundred feet several times daily.    DIET  Eat an American Heart Association diet.  If you are having trouble with your appetite, eat what you can.  Try eating small, frequent meals throughout the day.        ACTIVITY  1. You have no activity restrictions.  You may resume your daily activities at home, based on your comfort level. You may also drive.          FOLLOW UP  Your first follow up appointment will be on 5/15/24 at 1030am BY TELEPHONE. Our office is located in [facility] [place] [address]. Your second follow up appointment will be in four weeks, on 6/14/24 at 1045 following your echo in our offices at 945am. You will need to have an ECHO prior to your appointment time. Our office will set that up for you. You will also have a 1 year follow up in the valve clinic on 4/18/25 at 1130am following your echo in our offices at 1030am.  Please call our office at [phone] if you are unable to make either one of these appointments.   You will be receiving a call before your 3 day follow up appointment to begin cardiac rehab. They are programs located at the [facility], [facility].  The contact information is located in your Cardiac Surgery booklet.  Please call if you have not been contacted 2-3 weeks after discharge from the hospital.  We will make an appointment for you with your cardiologist in 4-5 weeks.  Consult you primary care physician regarding your influenza &   pneumovax vaccines.        5.   Please bring all medications with you to your appointment.    Signature:___________________________________________________

## 2024-05-10 NOTE — PROGRESS NOTES
A Spiritual Care Partner Volunteer visited Brent Sabillon Jr. at [facility] in [facility] 4 CV SERVICES UNIT on 5/10/2024     Documented by: Chaplain Payal Fang

## 2024-05-10 NOTE — PROGRESS NOTES
Cardiac Surgery FLOOR Progress Note    Admit Date: 2024  POD: 1 Day Post-Op      Procedure:  Procedure(s):  TRANSCATHETER EDGE TO EDGE REPAIR of the mitral vavle    Subjective:   Patient seen with Dr. Allen. Tmax 99.1, room air.  Feels well, breathing is good.      Objective:     BP (!) 160/59   Pulse 60   Temp 99 °F (37.2 °C) (Oral)   Resp 19   Ht 1.778 m (5' 10\")   Wt 82.7 kg (182 lb 4.8 oz)   SpO2 96%   BMI 26.16 kg/m²   Temp (24hrs), Av.3 °F (36.8 °C), Min:97.9 °F (36.6 °C), Max:99.1 °F (37.3 °C)      Last 24hr Input/Output:    Intake/Output Summary (Last 24 hours) at 5/10/2024 1028  Last data filed at 5/10/2024 0950  Gross per 24 hour   Intake 1840 ml   Output 2130 ml   Net -290 ml        EKG/Rhythm:   Encounter Date: 24   EKG 12 lead   Result Value    Ventricular Rate 52    QRS Duration 122    Q-T Interval 494    QTc Calculation (Bazett) 459    R Axis -8    T Axis -84    Diagnosis      Atrial fibrillation with slow ventricular response  Left ventricular hypertrophy with QRS widening ( Sokolow-Ace , Albino   product )  ST & T wave abnormality, consider inferolateral ischemia  Abnormal ECG  When compared with ECG of 03-MAY-2024 09:05,  No significant change was found  Confirmed by Rick HURST, [facility] ([id number]) on 2024 7:23:11 PM           Oxygen: Room air    CXR: Xray Result (most recent):  XR CHEST PORTABLE 2024    Narrative  EXAM:  XR CHEST PORTABLE    INDICATION: Post TAVR    COMPARISON: 5/3/2020    TECHNIQUE: portable chest AP view    FINDINGS: The thoracic aorta remains tortuous and atherosclerotic. There is  unchanged cardiomegaly. The pulmonary vasculature is within normal limits.    The lungs and pleural spaces are clear. The visualized bones and upper abdomen  are age-appropriate.    Impression  Unchanged cardiomegaly. No acute pulmonary process.          Admission Weight: Last Weight   Weight - Scale: 81.1 kg (178 lb 12.7 oz) Weight - Scale: 82.7 kg (182 lb 4.8 oz)  trial. Continue BPH medications (doxazosin, finasteride). Okay to resume anticoagulation in 1-2 days if urine continues to clear. Contact information provided for outpt follow up . Call if we can be of further assistance.     Mixon out at 1230p.  Will monitor.    Signed By: MYESHA Mcguire NP

## 2024-05-10 NOTE — PROGRESS NOTES
Cardiac Surgery Care Coordinator-  Met with Brent Sabillon Jr. provided him with the Transcatheter educational folder.  Reviewed plan of care and discussed planned discharge later today. Encouraged continued use of the incentive spirometer.  Reviewed the importance of daily temp and weight monitoring, discussed groin site care and reviewed signs and symptoms of infection.  Red reminder bracelet on left wrist, reviewed purpose of the bracelet and when to call the MD. Provided him with him Mitraclip  identification card and dental prophylaxis card, he stated he does not routinely visit the dentist. Discussed the purpose of both cards.  Reminded pt of appts and encouraged participation in the Cardiac Wellness and rehab program after discharge.  Encouraged him to verbalize and emotional support given.He is without questions or concerns at this time.

## 2024-05-10 NOTE — PROGRESS NOTES
0730: Bedside and Verbal shift change report given to KALIN Jensen (oncoming nurse) by KALIN Fairchild (offgoing nurse). Report included the following information Nurse Handoff Report, Index, Adult Overview, MAR, Recent Results, and Cardiac Rhythm Intermittent A. Fib - rate controlled 70's .     1230: Mixon d/c per order.    1530: Pt voided but incontinent, bladder scan post void 93 mL.     1600: BP in chair 81/44, transferred back to bed, BP sitting in bed 104/76 then standing BP 88/49 pt asymptomatic. NP Yaneth notified.     1930: Bedside and Verbal shift change report given to KALIN Fairchild (oncoming nurse) by KALIN Jensen (offgoing nurse). Report included the following information Nurse Handoff Report, Index, Adult Overview, MAR, Recent Results, and Cardiac Rhythm Intermittent A. Fib - rate controlled 70's .         Care Plan:   Problem: Chronic Conditions and Co-morbidities  Goal: Patient's chronic conditions and co-morbidity symptoms are monitored and maintained or improved  5/10/2024 1000 by Sabrina Plasencia RN  Outcome: Progressing  5/9/2024 2212 by Gurinder Johnson RN  Outcome: Progressing  Flowsheets (Taken 5/9/2024 2029)  Care Plan - Patient's Chronic Conditions and Co-Morbidity Symptoms are Monitored and Maintained or Improved: Monitor and assess patient's chronic conditions and comorbid symptoms for stability, deterioration, or improvement     Problem: Pain  Goal: Verbalizes/displays adequate comfort level or baseline comfort level  5/10/2024 1000 by Sabrina Plasencia RN  Outcome: Progressing  5/9/2024 2212 by Gurinder Johnson RN  Outcome: Progressing  Flowsheets (Taken 5/9/2024 2029)  Verbalizes/displays adequate comfort level or baseline comfort level: Encourage patient to monitor pain and request assistance     Problem: Discharge Planning  Goal: Discharge to home or other facility with appropriate resources  5/10/2024 1000 by Sabrina Plasencia RN  Outcome: Progressing  5/9/2024 2212 by Gurinder Johnson RN  Outcome:  Progressing  Flowsheets (Taken 5/9/2024 2029)  Discharge to home or other facility with appropriate resources: Identify barriers to discharge with patient and caregiver     Problem: Safety - Adult  Goal: Free from fall injury  5/10/2024 1000 by Sabrina Plasencia, RN  Outcome: Progressing  5/9/2024 2212 by Gurinder Johnson, RN  Outcome: Progressing

## 2024-05-10 NOTE — CONSULTS
Requesting Provider: Radha Allen MD              Patient: Brent Sabillon Jr. MRN: [id number]  SSN: [id number]    YOB: 1945  Age: 79 y.o.  Sex: male     Location: [facility]/       Code Status: Full Code   PCP: Yasmine Mascorro MD  - [phone]   Emergency Contact:  Primary Emergency Contact: Mar Sabillon, Home Phone: [phone]   Race/Caodaism/Language: Black /  / Amish / Speaks English   Payor: Payor: [employer] MEDICARE / Plan: HU[employer] GOLD PLUS HMO / Product Type: *No Product type* /    Prior Admission Data: 4/11/24 [facility] EMERGENCY DEPT Tam Monson   Hospitalized:  Hospital Day: 2 - Admitted 5/9/2024  5:28 AM   POD # 1 Day Post-Op Procedure(s):  TRANSCATHETER EDGE TO EDGE REPAIR by Radha Allen MD - Blood Loss: 20 mL 4 Hr 15 Min 6 Sec     CONSULTANTS  IP CONSULT TO CARDIAC REHAB  IP CONSULT TO UROLOGY   ADMISSION DIAGNOSES  [unfilled]      Assessment/Plan:       78 yo M with hx of BPH with gross hematuria likely 2/2 catheter trauma    - Hematuria resolving. Okay to remove randle with voiding trial. Continue BPH medications (doxazosin, finasteride). Okay to resume anticoagulation in 1-2 days if urine continues to clear. Contact information provided for outpt follow up . Call if we can be of further assistance.      CC: [unfilled]   HPI: He is a 79 y.o. male with PMHx of Mitral Regurgitation, HTN, CKD, Chronic Afib, BPH on doxazosin and finasteride, who is admitted s/p mitral valve repair. Urology is consulted for hematuria. Formerly followed by Dr. Carreon at , last seen in 2022, see below, for hx of BPH, PSA elevation s/p negative prostate biopsy, microscopic hematuria. Denies difficulties with voiding prior to admission or blood in the urine.   16 fr randle placed in the OR.  No documentation of any difficulty during Randle placement. Received call from nursing staff after his procedure 1 day ago that he was noticed to have gross hematuria and clots coming around the

## 2024-05-10 NOTE — PLAN OF CARE
Problem: Chronic Conditions and Co-morbidities  Goal: Patient's chronic conditions and co-morbidity symptoms are monitored and maintained or improved  Outcome: Progressing  Flowsheets (Taken 5/9/2024 2029)  Care Plan - Patient's Chronic Conditions and Co-Morbidity Symptoms are Monitored and Maintained or Improved: Monitor and assess patient's chronic conditions and comorbid symptoms for stability, deterioration, or improvement     Problem: Pain  Goal: Verbalizes/displays adequate comfort level or baseline comfort level  Outcome: Progressing  Flowsheets (Taken 5/9/2024 2029)  Verbalizes/displays adequate comfort level or baseline comfort level: Encourage patient to monitor pain and request assistance     Problem: Discharge Planning  Goal: Discharge to home or other facility with appropriate resources  Outcome: Progressing  Flowsheets (Taken 5/9/2024 2029)  Discharge to home or other facility with appropriate resources: Identify barriers to discharge with patient and caregiver     Problem: Safety - Adult  Goal: Free from fall injury  Outcome: Progressing

## 2024-05-10 NOTE — PROGRESS NOTES
Dr. Alejandro Llanes [facility] Cardiology.  [phone]            Structural Cardiology Consult/Progress Note      Brent Sabillon Jr.    Requesting/referring provider: DR. Rico    Reason for Consult: Mitral regurgitation    Assessment/Plan:    Severe mitral regurgitation, partial flail of posterior leaflet, ruptured chordae  Long-term persistent atrial fibrillation  Chronic anticoagulation: Xarelto   Primary hypertension  CKD 3  In the hospital also schedule level  Brent Sabillon Jr. is seen at the request of Dr. Rico for evaluation of mitral valve disease.  He has severe mitral regurgitation in conjunction with recurrent persistent atrial fibrillation.  He underwent transcatheter [id number] mitral valve repair with 2 MitraClip devices yesterday resulting in reduction of severe to torrential mitral regurgitation to mild to moderate.  Overnight complications in the form of hematuria from urinary catheter insertion which required placement of a larger catheter.  This morning urine is clear.  Will try to hold off Xarelto for the next 48 hours.  If urine clears out and urology feels that catheter can be removed, will plan to do so.  He will require few more days of IV diuretics.  Xarelto potentially to be restarted if there is no further hematuria for 48 hours.  Long-term may benefit from watchman if there is recurrent issues with hematuria.    If he can ambulate and there is no more issue with urinary retention, could consider potential discharge later today otherwise tomorrow morning.      []    High complexity decision making was performed  []    Patient is at high-risk of decompensation with multiple organ involvement  []    Complex/difficult social determinants of health outcomes  Total of ** minutes were spent on reviewing the records, analyzing investigations and documentation in the chart, on the day of visit including time for examination and time spent with the    Intimate Partner Violence: Not on file   Housing Stability: Not on file           ATTENTION:   This medical record was transcribed using an electronic medical records/speech recognition system.  Although proofread, it may and can contain electronic, spelling and other errors.  Corrections may be executed at a later time.  Please feel free to contact us for any clarifications as needed.    Mario Alberto [facility] and [facility] [place]  [facility], [place], [place]. [phone]

## 2024-05-11 VITALS
WEIGHT: 173.94 LBS | TEMPERATURE: 98.1 F | HEART RATE: 73 BPM | RESPIRATION RATE: 23 BRPM | OXYGEN SATURATION: 94 % | BODY MASS INDEX: 24.9 KG/M2 | DIASTOLIC BLOOD PRESSURE: 80 MMHG | HEIGHT: 70 IN | SYSTOLIC BLOOD PRESSURE: 115 MMHG

## 2024-05-11 LAB
ERYTHROCYTE [DISTWIDTH] IN BLOOD BY AUTOMATED COUNT: 14.2 % (ref 11.5–14.5)
HCT VFR BLD AUTO: 39.8 % (ref 36.6–50.3)
HGB BLD-MCNC: 13.6 G/DL (ref 12.1–17)
MCH RBC QN AUTO: 29.4 PG (ref 26–34)
MCHC RBC AUTO-ENTMCNC: 34.2 G/DL (ref 30–36.5)
MCV RBC AUTO: 86 FL (ref 80–99)
NRBC # BLD: 0 K/UL (ref 0–0.01)
NRBC BLD-RTO: 0 PER 100 WBC
PLATELET # BLD AUTO: 118 K/UL (ref 150–400)
PMV BLD AUTO: 11.4 FL (ref 8.9–12.9)
RBC # BLD AUTO: 4.63 M/UL (ref 4.1–5.7)
WBC # BLD AUTO: 6.7 K/UL (ref 4.1–11.1)

## 2024-05-11 PROCEDURE — 6370000000 HC RX 637 (ALT 250 FOR IP): Performed by: NURSE PRACTITIONER

## 2024-05-11 PROCEDURE — 36415 COLL VENOUS BLD VENIPUNCTURE: CPT

## 2024-05-11 PROCEDURE — APPSS45 APP SPLIT SHARED TIME 31-45 MINUTES: Performed by: NURSE PRACTITIONER

## 2024-05-11 PROCEDURE — 2580000003 HC RX 258: Performed by: NURSE PRACTITIONER

## 2024-05-11 PROCEDURE — 85027 COMPLETE CBC AUTOMATED: CPT

## 2024-05-11 RX ORDER — LISINOPRIL 40 MG/1
20 TABLET ORAL DAILY
Qty: 30 TABLET | Refills: 3 | Status: SHIPPED | OUTPATIENT
Start: 2024-05-11

## 2024-05-11 RX ADMIN — FINASTERIDE 5 MG: 5 TABLET, FILM COATED ORAL at 08:20

## 2024-05-11 RX ADMIN — SODIUM CHLORIDE, PRESERVATIVE FREE 10 ML: 5 INJECTION INTRAVENOUS at 08:22

## 2024-05-11 RX ADMIN — LISINOPRIL 20 MG: 20 TABLET ORAL at 08:21

## 2024-05-11 RX ADMIN — CARVEDILOL 25 MG: 12.5 TABLET, FILM COATED ORAL at 08:21

## 2024-05-11 RX ADMIN — FUROSEMIDE 40 MG: 40 TABLET ORAL at 08:22

## 2024-05-11 RX ADMIN — DOXAZOSIN 4 MG: 2 TABLET ORAL at 08:20

## 2024-05-11 RX ADMIN — POTASSIUM CHLORIDE 20 MEQ: 750 TABLET, FILM COATED, EXTENDED RELEASE ORAL at 08:19

## 2024-05-11 NOTE — PLAN OF CARE
0730: Bedside and Verbal shift change report given to KALIN Lee (oncoming nurse) by KALIN Fairchild (offgoing nurse). Report included the following information Nurse Handoff Report, Intake/Output, and Cardiac Rhythm afib .     1115: discharge instructions reviewed w/ pt and wife at bedside. All questions were answered and pt verbalized understanding of the teaching. Pt discharge w/ all belongings including glasses, cell phone, and clothing.     Problem: Chronic Conditions and Co-morbidities  Goal: Patient's chronic conditions and co-morbidity symptoms are monitored and maintained or improved  5/11/2024 0914 by Rosa Roth RN  Outcome: Progressing  5/10/2024 2132 by Gurinder Johnson RN  Outcome: Progressing  Flowsheets (Taken 5/10/2024 2000)  Care Plan - Patient's Chronic Conditions and Co-Morbidity Symptoms are Monitored and Maintained or Improved: Monitor and assess patient's chronic conditions and comorbid symptoms for stability, deterioration, or improvement     Problem: Pain  Goal: Verbalizes/displays adequate comfort level or baseline comfort level  5/11/2024 0914 by Rosa Roth RN  Outcome: Progressing  5/10/2024 2132 by Gurinder Johnson RN  Outcome: Progressing  Flowsheets (Taken 5/10/2024 2000)  Verbalizes/displays adequate comfort level or baseline comfort level: Encourage patient to monitor pain and request assistance     Problem: Discharge Planning  Goal: Discharge to home or other facility with appropriate resources  5/11/2024 0914 by Rosa Roth RN  Outcome: Progressing  5/10/2024 2132 by Gurinder Johnson RN  Outcome: Progressing  Flowsheets (Taken 5/10/2024 2000)  Discharge to home or other facility with appropriate resources: Identify barriers to discharge with patient and caregiver     Problem: Safety - Adult  Goal: Free from fall injury  5/11/2024 0914 by Rosa Roth RN  Outcome: Progressing  5/10/2024 2132 by Gurinder Johnson RN  Outcome: Progressing

## 2024-05-11 NOTE — PLAN OF CARE
Problem: Chronic Conditions and Co-morbidities  Goal: Patient's chronic conditions and co-morbidity symptoms are monitored and maintained or improved  5/11/2024 1042 by Rosa Roth RN  Outcome: Completed  5/11/2024 0914 by Rosa Roth RN  Outcome: Progressing  5/10/2024 2132 by Gurinder Johnson RN  Outcome: Progressing  Flowsheets (Taken 5/10/2024 2000)  Care Plan - Patient's Chronic Conditions and Co-Morbidity Symptoms are Monitored and Maintained or Improved: Monitor and assess patient's chronic conditions and comorbid symptoms for stability, deterioration, or improvement     Problem: Pain  Goal: Verbalizes/displays adequate comfort level or baseline comfort level  5/11/2024 1042 by Rosa Roth RN  Outcome: Completed  5/11/2024 0914 by Rosa Roth RN  Outcome: Progressing  5/10/2024 2132 by Gurinder Johnson RN  Outcome: Progressing  Flowsheets (Taken 5/10/2024 2000)  Verbalizes/displays adequate comfort level or baseline comfort level: Encourage patient to monitor pain and request assistance     Problem: Discharge Planning  Goal: Discharge to home or other facility with appropriate resources  5/11/2024 1042 by Rosa Roth RN  Outcome: Completed  5/11/2024 0914 by Rosa Roth RN  Outcome: Progressing  5/10/2024 2132 by Gurinder Johnson RN  Outcome: Progressing  Flowsheets (Taken 5/10/2024 2000)  Discharge to home or other facility with appropriate resources: Identify barriers to discharge with patient and caregiver     Problem: Safety - Adult  Goal: Free from fall injury  5/11/2024 1042 by Rosa Roth RN  Outcome: Completed  5/11/2024 0914 by Rosa Roth RN  Outcome: Progressing  5/10/2024 2132 by Gurinder Johnson RN  Outcome: Progressing

## 2024-05-11 NOTE — PROGRESS NOTES
Cardiac Surgery FLOOR Progress Note    Admit Date: 2024  POD: 2 Days Post-Op      Procedure:  Procedure(s):  TRANSCATHETER EDGE TO EDGE REPAIR of the mitral vavle    Subjective:   Doing well this am. Has been urinating without issue.  Afebrile, room air.  Wife is at the bedside. BP has been stable    Objective:     /80   Pulse 73   Temp 98.1 °F (36.7 °C) (Oral)   Resp 23   Ht 1.778 m (5' 10\")   Wt 78.9 kg (173 lb 15.1 oz)   SpO2 94%   BMI 24.96 kg/m²   Temp (24hrs), Av.4 °F (36.9 °C), Min:98 °F (36.7 °C), Max:98.8 °F (37.1 °C)      Last 24hr Input/Output:    Intake/Output Summary (Last 24 hours) at 2024 1019  Last data filed at 2024 0815  Gross per 24 hour   Intake 1080 ml   Output 800 ml   Net 280 ml        EKG/Rhythm:   Encounter Date: 24   EKG 12 lead   Result Value    Ventricular Rate 52    QRS Duration 122    Q-T Interval 494    QTc Calculation (Bazett) 459    R Axis -8    T Axis -84    Diagnosis      Atrial fibrillation with slow ventricular response  Left ventricular hypertrophy with QRS widening ( Sokolow-Ace , Albino   product )  ST & T wave abnormality, consider inferolateral ischemia  Abnormal ECG  When compared with ECG of 03-MAY-2024 09:05,  No significant change was found  Confirmed by Rick HURST, [facility] ([id number]) on 2024 7:23:11 PM           Oxygen: Room air    CXR: Xray Result (most recent):  XR CHEST PORTABLE 2024    Narrative  EXAM:  XR CHEST PORTABLE    INDICATION: Post TAVR    COMPARISON: 5/3/2020    TECHNIQUE: portable chest AP view    FINDINGS: The thoracic aorta remains tortuous and atherosclerotic. There is  unchanged cardiomegaly. The pulmonary vasculature is within normal limits.    The lungs and pleural spaces are clear. The visualized bones and upper abdomen  are age-appropriate.    Impression  Unchanged cardiomegaly. No acute pulmonary process.          Admission Weight: Last Weight   Weight - Scale: 81.1 kg (178 lb 12.7 oz) Weight - Scale:  continues to clear. Contact information provided for outpt follow up . Call if we can be of further assistance.   Hypokalemia: Daily Kdur restarted and will gave an extra dose. Continue to monitor.   Thrombocytopenia: Noted since April 2024.  Continue to monitor.     Dispo:Cardiac Rehab. Case Management for discharge planning. Home today      Signed By: MYESHA Mcguire NP

## 2024-05-11 NOTE — PLAN OF CARE
2100:  Held pt's coreg d/t HR sustaining in low 50s.      Problem: Chronic Conditions and Co-morbidities  Goal: Patient's chronic conditions and co-morbidity symptoms are monitored and maintained or improved  5/10/2024 2132 by Gurinder Johnson RN  Outcome: Progressing  Flowsheets (Taken 5/10/2024 2000)  Care Plan - Patient's Chronic Conditions and Co-Morbidity Symptoms are Monitored and Maintained or Improved: Monitor and assess patient's chronic conditions and comorbid symptoms for stability, deterioration, or improvement  5/10/2024 1000 by Sabrina Plasencia RN  Outcome: Progressing  Flowsheets (Taken 5/10/2024 0800)  Care Plan - Patient's Chronic Conditions and Co-Morbidity Symptoms are Monitored and Maintained or Improved: Monitor and assess patient's chronic conditions and comorbid symptoms for stability, deterioration, or improvement     Problem: Pain  Goal: Verbalizes/displays adequate comfort level or baseline comfort level  5/10/2024 2132 by Gurinder Johnson RN  Outcome: Progressing  Flowsheets (Taken 5/10/2024 2000)  Verbalizes/displays adequate comfort level or baseline comfort level: Encourage patient to monitor pain and request assistance  5/10/2024 1000 by Sabrina Plasencia RN  Outcome: Progressing     Problem: Discharge Planning  Goal: Discharge to home or other facility with appropriate resources  5/10/2024 2132 by Gurinder Johnson RN  Outcome: Progressing  Flowsheets (Taken 5/10/2024 2000)  Discharge to home or other facility with appropriate resources: Identify barriers to discharge with patient and caregiver  5/10/2024 1000 by Sabrina Plasencia RN  Outcome: Progressing  Flowsheets (Taken 5/10/2024 0800)  Discharge to home or other facility with appropriate resources: Identify barriers to discharge with patient and caregiver     Problem: Safety - Adult  Goal: Free from fall injury  5/10/2024 2132 by Gurinder Johnson RN  Outcome: Progressing  5/10/2024 1000 by Sabrina Plasencia RN  Outcome: Progressing

## 2024-05-13 ENCOUNTER — TELEPHONE (OUTPATIENT)
Age: 79
End: 2024-05-13

## 2024-05-13 NOTE — TELEPHONE ENCOUNTER
Patient returned my call. We walked through his discharge after visit summary and reviewed changes to his medications. His wife had questions regarding the amlodipine he was taking prior to surgery. I do not see it listed in his medications, but lisinopril is currently listed as a change. I instructed the patient to take the medications as prescribed and documented on his discharge summary. I will review with NP today.

## 2024-05-15 ENCOUNTER — OFFICE VISIT (OUTPATIENT)
Age: 79
End: 2024-05-15
Payer: MEDICARE

## 2024-05-15 VITALS
OXYGEN SATURATION: 99 % | DIASTOLIC BLOOD PRESSURE: 61 MMHG | HEART RATE: 55 BPM | WEIGHT: 179.5 LBS | BODY MASS INDEX: 25.76 KG/M2 | SYSTOLIC BLOOD PRESSURE: 100 MMHG | TEMPERATURE: 97.4 F

## 2024-05-15 DIAGNOSIS — Z95.818 S/P MITRAL VALVE CLIP IMPLANTATION: ICD-10-CM

## 2024-05-15 DIAGNOSIS — Z98.890 S/P MITRAL VALVE CLIP IMPLANTATION: ICD-10-CM

## 2024-05-15 DIAGNOSIS — I34.0 SEVERE MITRAL REGURGITATION: Primary | ICD-10-CM

## 2024-05-15 PROCEDURE — 1111F DSCHRG MED/CURRENT MED MERGE: CPT | Performed by: NURSE PRACTITIONER

## 2024-05-15 PROCEDURE — G8419 CALC BMI OUT NRM PARAM NOF/U: HCPCS | Performed by: NURSE PRACTITIONER

## 2024-05-15 PROCEDURE — 3074F SYST BP LT 130 MM HG: CPT | Performed by: NURSE PRACTITIONER

## 2024-05-15 PROCEDURE — 1036F TOBACCO NON-USER: CPT | Performed by: NURSE PRACTITIONER

## 2024-05-15 PROCEDURE — 99214 OFFICE O/P EST MOD 30 MIN: CPT | Performed by: NURSE PRACTITIONER

## 2024-05-15 PROCEDURE — 1123F ACP DISCUSS/DSCN MKR DOCD: CPT | Performed by: NURSE PRACTITIONER

## 2024-05-15 PROCEDURE — 3078F DIAST BP <80 MM HG: CPT | Performed by: NURSE PRACTITIONER

## 2024-05-15 PROCEDURE — G8427 DOCREV CUR MEDS BY ELIG CLIN: HCPCS | Performed by: NURSE PRACTITIONER

## 2024-05-15 NOTE — PROGRESS NOTES
Patient: Brent Sabillon Jr.   Age: 79 y.o.     Patient Care Team:  Yasmine Mascorro MD as PCP - General  Yasmine Mascorro MD as PCP - [facility] Provider  Kali Post MD as Consulting Physician (Cardiothoracic Surgery)  Radha Allen MD as Consulting Physician (Cardiology)    PCP: Yasmine Mascorro MD    Cardiologist: Dr. Allen    Diagnosis/Reason for Consultation: The primary encounter diagnosis was Severe mitral regurgitation. A diagnosis of S/P mitral valve clip implantation was also pertinent to this visit.    Problem List:   Patient Active Problem List   Diagnosis    Chronic a-fib (HCC)    Chronic anticoagulation    History of tobacco use    Malignant hypertension    CKD (chronic kidney disease)    Essential hypertension    Unstable angina (HCC)    Severe mitral regurgitation    Mitral regurgitation    Acute on chronic heart failure with preserved ejection fraction (HCC)    S/P mitral valve clip implantation         HPI: 79 y.o. y.o. male  S/P TRANSCATHETER EDGE TO EDGE REPAIR of the mitral valve on 5/9/24 with Dr. Allen. Patient was transferred to the PACU in stable condition on no gtts. The patient's post operative course was complicated by hematuria requiring a Urology consultation. The patient was stable and ready for discharge on 5/11/24.  he  is here today for his  Initial Post Op Appointment.    Denies fever, chills, worsening shortness of breath or fatigue, chest pain, orthopnea, PND, dizziness, syncope or recent fall, or issues with his incisions. He has been getting around well at home and walking in his home. He hasn't been walking outside yet as it has been rainy. He had a low BP at home today but had still been taking his Norvasc and Torsemide in addition to his other BP medications.  He brought his pill bottles and we  the ones he shouldn't be taking. He hasn't made his follow up with Urology yet but will call this week.  He is not having issues urinating and denies

## 2024-06-04 ENCOUNTER — HOSPITAL ENCOUNTER (OUTPATIENT)
Facility: HOSPITAL | Age: 79
Setting detail: RECURRING SERIES
Discharge: HOME | End: 2024-06-07
Attending: INTERNAL MEDICINE
Payer: MEDICARE

## 2024-06-04 VITALS — WEIGHT: 187 LBS | RESPIRATION RATE: 22 BRPM | OXYGEN SATURATION: 98 % | HEIGHT: 70 IN | BODY MASS INDEX: 26.77 KG/M2

## 2024-06-04 PROCEDURE — 93798 PHYS/QHP OP CAR RHAB W/ECG: CPT

## 2024-06-04 PROCEDURE — 93797 PHYS/QHP OP CAR RHAB WO ECG: CPT

## 2024-06-04 ASSESSMENT — PATIENT HEALTH QUESTIONNAIRE - PHQ9
SUM OF ALL RESPONSES TO PHQ9 QUESTIONS 1 & 2: 3
2. FEELING DOWN, DEPRESSED OR HOPELESS: NOT AT ALL
6. FEELING BAD ABOUT YOURSELF - OR THAT YOU ARE A FAILURE OR HAVE LET YOURSELF OR YOUR FAMILY DOWN: NOT AT ALL
1. LITTLE INTEREST OR PLEASURE IN DOING THINGS: NEARLY EVERY DAY
5. POOR APPETITE OR OVEREATING: SEVERAL DAYS
8. MOVING OR SPEAKING SO SLOWLY THAT OTHER PEOPLE COULD HAVE NOTICED. OR THE OPPOSITE, BEING SO FIGETY OR RESTLESS THAT YOU HAVE BEEN MOVING AROUND A LOT MORE THAN USUAL: NOT AT ALL
SUM OF ALL RESPONSES TO PHQ QUESTIONS 1-9: 4
9. THOUGHTS THAT YOU WOULD BE BETTER OFF DEAD, OR OF HURTING YOURSELF: NOT AT ALL
10. IF YOU CHECKED OFF ANY PROBLEMS, HOW DIFFICULT HAVE THESE PROBLEMS MADE IT FOR YOU TO DO YOUR WORK, TAKE CARE OF THINGS AT HOME, OR GET ALONG WITH OTHER PEOPLE: NOT DIFFICULT AT ALL
SUM OF ALL RESPONSES TO PHQ QUESTIONS 1-9: 4
4. FEELING TIRED OR HAVING LITTLE ENERGY: NOT AT ALL
SUM OF ALL RESPONSES TO PHQ QUESTIONS 1-9: 4
3. TROUBLE FALLING OR STAYING ASLEEP: NOT AT ALL
7. TROUBLE CONCENTRATING ON THINGS, SUCH AS READING THE NEWSPAPER OR WATCHING TELEVISION: NOT AT ALL
SUM OF ALL RESPONSES TO PHQ QUESTIONS 1-9: 4

## 2024-06-04 ASSESSMENT — EXERCISE STRESS TEST
PEAK_METS: 1.7
PEAK_RPE: 12
PEAK_BP: 142/88
PEAK_HR: 91
PEAK_BP: 142/88

## 2024-06-04 ASSESSMENT — LIFESTYLE VARIABLES: SMOKELESS_TOBACCO: NO

## 2024-06-04 ASSESSMENT — EJECTION FRACTION: EF_VALUE: 60

## 2024-06-04 NOTE — CARDIO/PULMONARY
INTAKE APPOINTMENT NOTE  2024    NAME: Brent Sabillon Jr. : 1945 AGE: 79 y.o.  GENDER: male    CARDIAC REHAB ADMITTING DIAGNOSIS: Personal history of surgery to heart and great vessels, presenting hazards to health [Z98.890]    REFERRING PHYSICIAN: Radha Allen MD    MEDICAL HX:  Past Medical History:   Diagnosis Date    Atrial fibrillation (HCC)     CKD (chronic kidney disease)     Heart failure (HCC)     Hypertension      Past Surgical History:   Procedure Laterality Date    CARDIAC PROCEDURE N/A 2024    Left and right heart cath / coronary angiography performed by Radha Allen MD at [facility] CARDIAC CATH LAB    CARDIAC PROCEDURE N/A 2024    Rodri during cath case performed by Radha Allen MD at [facility] CARDIAC CATH LAB    CARDIAC SURGERY N/A 2024    TRANSCATHETER EDGE TO EDGE REPAIR performed by Radha Allen MD at [facility] OPEN HEART    CATARACT EXTRACTION Bilateral     ENDOSCOPY, COLON, DIAGNOSTIC      HEMORRHOID SURGERY      MITRAL VALVE REPAIR  2024    RETINAL DETACHMENT SURGERY Bilateral       LABS:     No results found for: \"HBA1C\", \"YAM5URBB\"  No results found for: \"CHOL\", \"CHOLPOCT\", \"CHLST\", \"CHOLV\", \"HDL\", \"HDLPOC\", \"HDLC\", \"LDL\", \"VLDLC\", \"VLDL\"      ANTHROPOMETRICS:      Ht Readings from Last 1 Encounters:   24 1.778 m (5' 10\")      Wt Readings from Last 1 Encounters:   24 84.8 kg (187 lb)        WAIST: 39.5 inches       VISIT SUMMARY:    Brent Sabillon Jr. 79 y.o. presented to Cardiac Rehab for program orientation and 6 minute walk test today with a primary diagnosis of Personal history of surgery to heart and great vessels, presenting hazards to health [Z98.890] (Mitral Valve Repair on 24). His EF is 60 %. Cardiac risk factors include smoking/ tobacco exposure, dyslipidemia, hypertension.  Brent Sabillon Jr. is  and lives with his wife. He is retired and lives in [place]. Patient was evaluated for depression using the

## 2024-06-11 ENCOUNTER — HOSPITAL ENCOUNTER (OUTPATIENT)
Facility: HOSPITAL | Age: 79
Setting detail: RECURRING SERIES
Discharge: HOME | End: 2024-06-14
Attending: INTERNAL MEDICINE
Payer: MEDICARE

## 2024-06-11 VITALS — WEIGHT: 190 LBS | BODY MASS INDEX: 27.26 KG/M2

## 2024-06-11 PROCEDURE — 93797 PHYS/QHP OP CAR RHAB WO ECG: CPT

## 2024-06-11 PROCEDURE — 93798 PHYS/QHP OP CAR RHAB W/ECG: CPT

## 2024-06-11 ASSESSMENT — EXERCISE STRESS TEST
PEAK_METS: 1.7
PEAK_METS: 1.7
PEAK_RPE: 12

## 2024-06-14 ENCOUNTER — CLINICAL DOCUMENTATION (OUTPATIENT)
Age: 79
End: 2024-06-14

## 2024-06-14 ENCOUNTER — OFFICE VISIT (OUTPATIENT)
Age: 79
End: 2024-06-14

## 2024-06-14 ENCOUNTER — HOSPITAL ENCOUNTER (OUTPATIENT)
Facility: HOSPITAL | Age: 79
End: 2024-06-14
Payer: MEDICARE

## 2024-06-14 VITALS
BODY MASS INDEX: 27.41 KG/M2 | OXYGEN SATURATION: 98 % | WEIGHT: 191 LBS | TEMPERATURE: 97.5 F | DIASTOLIC BLOOD PRESSURE: 104 MMHG | SYSTOLIC BLOOD PRESSURE: 160 MMHG | HEART RATE: 53 BPM

## 2024-06-14 DIAGNOSIS — Z95.818 S/P MITRAL VALVE CLIP IMPLANTATION: Primary | ICD-10-CM

## 2024-06-14 DIAGNOSIS — Z98.890 S/P MITRAL VALVE CLIP IMPLANTATION: Primary | ICD-10-CM

## 2024-06-14 DIAGNOSIS — I34.0 SEVERE MITRAL REGURGITATION: ICD-10-CM

## 2024-06-14 DIAGNOSIS — I34.0 MITRAL VALVE INSUFFICIENCY, UNSPECIFIED ETIOLOGY: ICD-10-CM

## 2024-06-14 LAB
ECHO AV AREA PEAK VELOCITY: 1.1 CM2
ECHO AV PEAK GRADIENT: 11 MMHG
ECHO AV PEAK VELOCITY: 1.7 M/S
ECHO AV VELOCITY RATIO: 0.41
ECHO EST RA PRESSURE: 3 MMHG
ECHO LA DIAMETER: 7.8 CM
ECHO LA VOL A-L A4C: 451 ML (ref 18–58)
ECHO LA VOL MOD A4C: 429 ML (ref 18–58)
ECHO LV FRACTIONAL SHORTENING: 46 % (ref 28–44)
ECHO LV INTERNAL DIMENSION DIASTOLIC: 6.3 CM (ref 4.2–5.9)
ECHO LV INTERNAL DIMENSION SYSTOLIC: 3.4 CM
ECHO LV IVSD: 1.2 CM (ref 0.6–1)
ECHO LV MASS 2D: 321.8 G (ref 88–224)
ECHO LV POSTERIOR WALL DIASTOLIC: 1.1 CM (ref 0.6–1)
ECHO LV RELATIVE WALL THICKNESS RATIO: 0.35
ECHO LVOT AREA: 2.8 CM2
ECHO LVOT DIAM: 1.9 CM
ECHO LVOT PEAK GRADIENT: 2 MMHG
ECHO LVOT PEAK VELOCITY: 0.7 M/S
ECHO MV AREA PHT: 2.2 CM2
ECHO MV E VELOCITY: 1.73 M/S
ECHO MV MAX VELOCITY: 2.5 M/S
ECHO MV MEAN GRADIENT: 7 MMHG
ECHO MV MEAN VELOCITY: 1.2 M/S
ECHO MV PEAK GRADIENT: 24 MMHG
ECHO MV PRESSURE HALF TIME (PHT): 101.6 MS
ECHO MV REGURGITANT PEAK GRADIENT: 159 MMHG
ECHO MV REGURGITANT PEAK VELOCITY: 6.3 M/S
ECHO MV REGURGITANT VTIA: 195.2 CM
ECHO MV VTI: 45 CM
ECHO RIGHT VENTRICULAR SYSTOLIC PRESSURE (RVSP): 48 MMHG
ECHO RV INTERNAL DIMENSION: 3.7 CM
ECHO RV TAPSE: 1.8 CM (ref 1.7–?)
ECHO TV REGURGITANT MAX VELOCITY: 3.37 M/S
ECHO TV REGURGITANT PEAK GRADIENT: 45 MMHG

## 2024-06-14 PROCEDURE — 93306 TTE W/DOPPLER COMPLETE: CPT

## 2024-06-14 NOTE — PROGRESS NOTES
Patient: Brent Sabillon Jr.   Age: 79 y.o.     Patient Care Team:  Yasmine Mascorro MD as PCP - General  Yasmine Mascorro MD as PCP - [facility] Provider  Kali Post MD as Consulting Physician (Cardiothoracic Surgery)  Radha Allen MD as Consulting Physician (Cardiology)    PCP: Yasmine Mascorro MD    Cardiologist: Dr. Allen/Dr. Rico    Diagnosis/Reason for Consultation: The primary encounter diagnosis was S/P mitral valve clip implantation. A diagnosis of Severe mitral regurgitation was also pertinent to this visit.    Problem List:   Patient Active Problem List   Diagnosis    Chronic a-fib (HCC)    Chronic anticoagulation    History of tobacco use    Malignant hypertension    CKD (chronic kidney disease)    Essential hypertension    Unstable angina (HCC)    Severe mitral regurgitation    Mitral regurgitation    Acute on chronic heart failure with preserved ejection fraction (HCC)    S/P mitral valve clip implantation         HPI: 79 y.o. y.o. male  S/P  TRANSCATHETER EDGE TO EDGE REPAIR of the mitral valve on 5/9/24 with Dr. Allen. Patient was transferred to the PACU in stable condition on no gtts. The patient's post operative course was complicated by hematuria requiring a Urology consultation. The patient was stable and ready for discharge on 5/11/24.  he  is here today for his  One Month Post Op Appointment.    Denies fever, chills, worsening shortness of breath or fatigue, chest pain, orthopnea, PND, dizziness, syncope or recent fall. States his dyspnea is much improved.  He has been going to Cardiac Rehab. He had his 1 month echo just prior to this appointment. Still has LE edema but states this has been stable.  His weight has been stable.  His BP is quite elevated in the clinic today and he brings a note from Cardiac Rehab where his BP was 170/110.  In review of his medications, he states he is taking everything on our list but then states he is only taking 5 pills which doesn't add  Include Location In Plan?: Yes Detail Level: Simple

## 2024-06-14 NOTE — PROGRESS NOTES
Spoke to [facility] to see if the patient had follow up scheduled. He does not, but the  sent a note to the nurse to check in with the patient for further medication management. Patient has follow up scheduled with cardiology.

## 2024-06-18 ENCOUNTER — HOSPITAL ENCOUNTER (OUTPATIENT)
Facility: HOSPITAL | Age: 79
Setting detail: RECURRING SERIES
Discharge: HOME | End: 2024-06-21
Attending: INTERNAL MEDICINE
Payer: MEDICARE

## 2024-06-18 ENCOUNTER — TELEPHONE (OUTPATIENT)
Age: 79
End: 2024-06-18

## 2024-06-18 VITALS — WEIGHT: 191.6 LBS | BODY MASS INDEX: 27.49 KG/M2

## 2024-06-18 PROCEDURE — 93798 PHYS/QHP OP CAR RHAB W/ECG: CPT

## 2024-06-18 PROCEDURE — 93797 PHYS/QHP OP CAR RHAB WO ECG: CPT

## 2024-06-18 ASSESSMENT — EXERCISE STRESS TEST
PEAK_RPE: 12
PEAK_METS: 1.7

## 2024-06-18 NOTE — TELEPHONE ENCOUNTER
----- Message from José Antonio Rico MD sent at 6/18/2024 11:12 AM EDT -----  Regarding: RE: HTN  Thx.  Alexandra, lets switch his lisinopril to 100 mg of losartan. Update us with Bps in 1 week . thx  ----- Message -----  From: Poppy Gutierrez RN  Sent: 6/18/2024  11:09 AM EDT  To: José Antonio Rico MD  Subject: HTN                                              Dr. Rico,    [facility] sessions with us. We have noticed that his BP has been elevated each time before and after exercise in the 140s-150s/90s-100s. We just wanted to notify you in case you wanted to make any medication adjustments. He is in chronic A Fib with HR in the 50s-60s with occasional PVCs. He is currently taking Cardura, Lasix, and Lisinopril for BP control.    Thanks,  Poppy Gutierrez, RN, BSN

## 2024-06-19 RX ORDER — LOSARTAN POTASSIUM 100 MG/1
100 TABLET ORAL DAILY
Qty: 90 TABLET | Refills: 1 | Status: SHIPPED | OUTPATIENT
Start: 2024-06-19

## 2024-06-19 NOTE — TELEPHONE ENCOUNTER
Telephone call made to patient's wife. Two patient identifiers verified.   Told her to have patient stop lisinopril and start losartan 100 mg daily. The patient to follow up with bp readings.   Requested Prescriptions     Signed Prescriptions Disp Refills    losartan (COZAAR) 100 MG tablet 90 tablet 1     Sig: Take 1 tablet by mouth daily     Authorizing Provider: NIKHIL BOYD     Ordering User: JAHAIRA PETERSEN MD    Future Appointments   Date Time Provider Department Center   6/25/2024  9:30 AM [facility] CARDIAC WELLNESS CLINIC [facility]   6/25/2024 10:30 AM [facility] CARDIAC WELLNESS EXERCISE [facility]   7/2/2024  9:30 AM [facility] CARDIAC WELLNESS EXERCISE [facility]   7/2/2024 10:30 AM [facility] CR NUTRITION [facility]   7/3/2024  3:40 PM Radha Allen MD [facility]   7/8/2024 11:20 AM Nikhil Boyd MD [facility]   7/9/2024  9:30 AM [facility] CARDIAC WELLNESS CLINIC [facility]   7/9/2024 10:30 AM [facility] CARDIAC WELLNESS EXERCISE [facility]   7/16/2024  9:30 AM [facility] CARDIAC WELLNESS CLINIC [facility]   7/16/2024 10:30 AM [facility] CARDIAC WELLNESS EXERCISE [facility]   7/23/2024  9:30 AM [facility] CARDIAC WELLNESS CLINIC [facility]   7/23/2024 10:30 AM [facility] CARDIAC WELLNESS EXERCISE [facility]   7/30/2024  9:30 AM [facility] CARDIAC WELLNESS CLINIC [facility]   7/30/2024 10:30 AM [facility] CARDIAC WELLNESS EXERCISE [facility]   8/6/2024  9:30 AM [facility] CARDIAC WELLNESS CLINIC [facility]   8/6/2024 10:30 AM [facility] CARDIAC WELLNESS EXERCISE [facility]   8/13/2024  9:30 AM [facility] CARDIAC WELLNESS CLINIC [facility]   8/13/2024 10:30 AM [facility] CARDIAC WELLNESS EXERCISE [facility]   8/20/2024  9:30 AM [facility] CARDIAC WELLNESS CLINIC [facility]   8/20/2024 10:30 AM [facility] CARDIAC WELLNESS EXERCISE [facility]   8/27/2024 10:30 AM [facility]   4/18/2025 10:30 AM [facility] ECHO LAB 1 HNIC [facility]   4/18/2025 11:30 AM Destiny Wolfe APRN - NP [facility] AMB

## 2024-06-20 ENCOUNTER — TELEPHONE (OUTPATIENT)
Age: 79
End: 2024-06-20

## 2024-06-20 RX ORDER — FUROSEMIDE 40 MG/1
40 TABLET ORAL DAILY
Qty: 90 TABLET | Refills: 1 | Status: SHIPPED | OUTPATIENT
Start: 2024-06-20 | End: 2024-12-17

## 2024-06-20 NOTE — TELEPHONE ENCOUNTER
Telephone call made to patient's wife. Two patient identifiers verified.   Went over med changes with both the patient and his wife and med list. Refilled furosemide.     Requested Prescriptions     Signed Prescriptions Disp Refills    furosemide (LASIX) 40 MG tablet 90 tablet 1     Sig: Take 1 tablet by mouth daily     Authorizing Provider: NIKHIL BOYD     Ordering User: JAHAIRA PETERSEN MD    Future Appointments   Date Time Provider Department Center   6/25/2024  9:30 AM [facility] CARDIAC WELLNESS CLINIC [facility]   6/25/2024 10:30 AM [facility] CARDIAC WELLNESS EXERCISE [facility]   7/2/2024  9:30 AM [facility] CARDIAC WELLNESS EXERCISE [facility]   7/2/2024 10:30 AM [facility] CR NUTRITION [facility]   7/3/2024  3:40 PM Radha Allen MD [facility]   7/8/2024 11:20 AM Nikhil Boyd MD [facility]   7/9/2024  9:30 AM [facility] CARDIAC WELLNESS CLINIC [facility]   7/9/2024 10:30 AM [facility] CARDIAC WELLNESS EXERCISE [facility]   7/16/2024  9:30 AM [facility] CARDIAC WELLNESS CLINIC [facility]   7/16/2024 10:30 AM [facility] CARDIAC WELLNESS EXERCISE [facility]   7/23/2024  9:30 AM [facility] CARDIAC WELLNESS CLINIC [facility]   7/23/2024 10:30 AM [facility] CARDIAC WELLNESS EXERCISE [facility]   7/30/2024  9:30 AM [facility] CARDIAC WELLNESS CLINIC [facility]   7/30/2024 10:30 AM [facility] CARDIAC WELLNESS EXERCISE [facility]   8/6/2024  9:30 AM [facility] CARDIAC WELLNESS CLINIC [facility]   8/6/2024 10:30 AM [facility] CARDIAC WELLNESS EXERCISE [facility]   8/13/2024  9:30 AM [facility] CARDIAC WELLNESS CLINIC [facility]   8/13/2024 10:30 AM [facility] CARDIAC WELLNESS EXERCISE [facility]   8/20/2024  9:30 AM [facility] CARDIAC WELLNESS CLINIC [facility]   8/20/2024 10:30 AM [facility] CARDIAC WELLNESS EXERCISE [facility]   8/27/2024 10:30 AM [facility] CARDIAC WELLNESS EXERCISE [facility]   4/18/2025 10:30 AM [facility] ECHO LAB 1 HNIC [facility]   4/18/2025 11:30 AM Destiny Wolfe, MYESHA - NP [facility] AMB

## 2024-06-20 NOTE — TELEPHONE ENCOUNTER
Patient's wife is calling to find out if her  is suppose to stop Lisinopril, and Rosuvastatin 20 mg and start the Losartan Potassium 100 mg.Please advise.    [phone]

## 2024-06-25 ENCOUNTER — HOSPITAL ENCOUNTER (OUTPATIENT)
Facility: HOSPITAL | Age: 79
Setting detail: RECURRING SERIES
Discharge: HOME | End: 2024-06-28
Attending: INTERNAL MEDICINE
Payer: MEDICARE

## 2024-06-25 VITALS — WEIGHT: 193.4 LBS | BODY MASS INDEX: 27.75 KG/M2

## 2024-06-25 PROCEDURE — 93798 PHYS/QHP OP CAR RHAB W/ECG: CPT

## 2024-06-25 PROCEDURE — 93797 PHYS/QHP OP CAR RHAB WO ECG: CPT

## 2024-06-25 ASSESSMENT — EJECTION FRACTION: EF_VALUE: 60

## 2024-06-25 ASSESSMENT — EXERCISE STRESS TEST
PEAK_RPE: 12
PEAK_METS: 1.7

## 2024-06-25 ASSESSMENT — LIFESTYLE VARIABLES: SMOKELESS_TOBACCO: NO

## 2024-07-02 ENCOUNTER — HOSPITAL ENCOUNTER (OUTPATIENT)
Facility: HOSPITAL | Age: 79
Setting detail: RECURRING SERIES
Discharge: HOME | End: 2024-07-05
Attending: INTERNAL MEDICINE
Payer: MEDICARE

## 2024-07-02 VITALS — BODY MASS INDEX: 27.98 KG/M2 | WEIGHT: 195 LBS

## 2024-07-02 PROCEDURE — 93797 PHYS/QHP OP CAR RHAB WO ECG: CPT

## 2024-07-02 PROCEDURE — 93798 PHYS/QHP OP CAR RHAB W/ECG: CPT

## 2024-07-02 ASSESSMENT — EXERCISE STRESS TEST
PEAK_RPE: 12
PEAK_METS: 1.7

## 2024-07-02 NOTE — PROGRESS NOTES
Making many healthy choices; 41-57: Some choices need improving 24-40: many choices need improving)    24 HOUR DIET RECALL  Breakfast Frosted Flakes or Frosted Krispies with Whole Milk and 1 boiled egg, Toast, 2 Turkey Sausages or Breakfast [place] (biscuit/Egg/Sausage)   Lunch NONE   Dinner Baked Chicken, Vegetables, Sweet Potato, Some times Ribs   Snacks Ice cream [place]   Beverages Decaf Coffee, Water, Caffeine Free Soda     Environmental/Social:  Pt live with wife, who does the cooking at home.  Pt was a  for years prior to retiring and couple had a [employer] business.      Estimated Energy Needs: 2000 (using [address] [employer] with AF 1.2)    NUTRITION INTERVENTION:  Nutrition 60 minute one-on-one education & goal setting with Brent Sabillon Jr.    Reviewed with relevant labs compared to ideals.    Reviewed weight history and patient's verbalized weight goal as well as any real or perceived barriers to obtaining the goal. Collaborated with patient to set a specific short and long term weight goal.     Reviewed Rate Your Plate and conducted a verbal diet recall.  Assessed for environmental, financial, psychosocial, physical and comorbidities that may impact the food and eating patterns / behaviors.    Collaborated with patient to set specific nutrient goals as well as specific food / behavior changes that will help patient meet the overall goal of following a heart healthy eating pattern (using guidelines as set forth by the American Heart Association and modeled after healthful eating patterns as recognized by the USDA Dietary Guidelines such as DASH, Mediterranean or plant-based).    Briefly reviewed with Brent Sabillon Jr. the nutrition information in the Cardiac Rehab patient education book and encouraged Brent Sabillon Jr. to read thoroughly, ask questions as needed, and use for future reference for heart healthy nutrition information.    Supplemental handouts provided: [place]

## 2024-07-03 ENCOUNTER — OFFICE VISIT (OUTPATIENT)
Age: 79
End: 2024-07-03
Payer: MEDICARE

## 2024-07-03 VITALS
HEIGHT: 70 IN | SYSTOLIC BLOOD PRESSURE: 138 MMHG | HEART RATE: 61 BPM | DIASTOLIC BLOOD PRESSURE: 88 MMHG | OXYGEN SATURATION: 95 % | WEIGHT: 192.8 LBS | BODY MASS INDEX: 27.6 KG/M2

## 2024-07-03 DIAGNOSIS — Z09 HOSPITAL DISCHARGE FOLLOW-UP: Primary | ICD-10-CM

## 2024-07-03 PROCEDURE — 99214 OFFICE O/P EST MOD 30 MIN: CPT | Performed by: INTERNAL MEDICINE

## 2024-07-03 RX ORDER — AMLODIPINE BESYLATE 5 MG/1
5 TABLET ORAL DAILY
COMMUNITY
Start: 2024-06-10

## 2024-07-03 ASSESSMENT — PATIENT HEALTH QUESTIONNAIRE - PHQ9
SUM OF ALL RESPONSES TO PHQ9 QUESTIONS 1 & 2: 0
SUM OF ALL RESPONSES TO PHQ QUESTIONS 1-9: 0
1. LITTLE INTEREST OR PLEASURE IN DOING THINGS: NOT AT ALL
SUM OF ALL RESPONSES TO PHQ QUESTIONS 1-9: 0
2. FEELING DOWN, DEPRESSED OR HOPELESS: NOT AT ALL
SUM OF ALL RESPONSES TO PHQ QUESTIONS 1-9: 0
SUM OF ALL RESPONSES TO PHQ QUESTIONS 1-9: 0

## 2024-07-03 NOTE — PATIENT INSTRUCTIONS
Continue taking 40mg of furosemide in the am, he may take another 40mg tablet in the pm if he experiences leg swelling

## 2024-07-03 NOTE — PROGRESS NOTES
Dr. Alejandro Llanes [facility] Cardiology.  [phone]            Structural Cardiology Consult/Progress Note      Brent Sabillon Jr.    Requesting/referring provider: DR. Rico    Reason for Consult: Mitral regurgitation    Assessment/Plan:    Severe mitral regurgitation, partial flail of posterior leaflet, ruptured chordae status post transcatheter [id number] repair with 2 MitraClip XT W's with reduction to moderate MR  Long-term persistent atrial fibrillation  Chronic anticoagulation: Xarelto   Primary hypertension  CKD2-3    Brent Sabillon Jr. seen for evaluation of mitral valve disease.  He has severe mitral regurgitation in conjunction with recurrent persistent atrial fibrillation.  He underwent transcatheter [id number] mitral valve repair with 2 MitraClip devices yesterday resulting in reduction of severe to torrential mitral regurgitation to mild to moderate.      Postprocedural complication in the form of hematuria from traumatic catheterization which required discontinuation of Xarelto but eventually improved.  He was discharged home and had some issues with low blood pressure requiring rearranging his home medications.Postprocedural 1 month echo demonstrated the MR is moderate or less.    Xarelto has been restarted at 50 mg daily which was his original dose.  No further hematuria.  He should continue his routine follow-up with Dr. Rico.  He will be seen in valve clinic at 1 year follow-up.      Investigations ordered      []    High complexity decision making was performed  []    Patient is at high-risk of decompensation with multiple organ involvement  []    Complex/difficult social determinants of health outcomes  Total of ** minutes were spent on reviewing the records, analyzing investigations and documentation in the chart, on the day of visit including time for examination and time spent with the patient    Investigations personally reviewed and

## 2024-07-09 ENCOUNTER — TELEPHONE (OUTPATIENT)
Age: 79
End: 2024-07-09

## 2024-07-09 ENCOUNTER — HOSPITAL ENCOUNTER (OUTPATIENT)
Facility: HOSPITAL | Age: 79
Setting detail: RECURRING SERIES
Discharge: HOME | End: 2024-07-12
Attending: INTERNAL MEDICINE
Payer: MEDICARE

## 2024-07-09 VITALS — WEIGHT: 196.8 LBS | BODY MASS INDEX: 28.24 KG/M2

## 2024-07-09 PROCEDURE — 93798 PHYS/QHP OP CAR RHAB W/ECG: CPT

## 2024-07-09 PROCEDURE — 93797 PHYS/QHP OP CAR RHAB WO ECG: CPT

## 2024-07-09 ASSESSMENT — EXERCISE STRESS TEST
PEAK_METS: 1.7
PEAK_RPE: 12

## 2024-07-09 NOTE — TELEPHONE ENCOUNTER
Pt and wife came into office to discuss medication changes at last visit. Provided pt with med list and explained that he should take 40mg of lasix daily and another tablet if he experiences leg swelling. Pt and wife verbalize understanding. No further questions.

## 2024-07-16 ENCOUNTER — HOSPITAL ENCOUNTER (OUTPATIENT)
Facility: HOSPITAL | Age: 79
Setting detail: RECURRING SERIES
Discharge: HOME | End: 2024-07-19
Attending: INTERNAL MEDICINE
Payer: MEDICARE

## 2024-07-16 VITALS — BODY MASS INDEX: 27.43 KG/M2 | WEIGHT: 191.2 LBS

## 2024-07-16 PROCEDURE — 93798 PHYS/QHP OP CAR RHAB W/ECG: CPT

## 2024-07-16 PROCEDURE — 93797 PHYS/QHP OP CAR RHAB WO ECG: CPT

## 2024-07-16 ASSESSMENT — EXERCISE STRESS TEST
PEAK_RPE: 12
PEAK_METS: 1.7

## 2024-07-16 ASSESSMENT — EJECTION FRACTION: EF_VALUE: 60

## 2024-07-16 ASSESSMENT — LIFESTYLE VARIABLES: SMOKELESS_TOBACCO: NO

## 2024-07-23 ENCOUNTER — HOSPITAL ENCOUNTER (OUTPATIENT)
Facility: HOSPITAL | Age: 79
Setting detail: RECURRING SERIES
Discharge: HOME | End: 2024-07-26
Attending: INTERNAL MEDICINE
Payer: MEDICARE

## 2024-07-23 VITALS — BODY MASS INDEX: 28.3 KG/M2 | WEIGHT: 197.2 LBS

## 2024-07-23 PROCEDURE — 93798 PHYS/QHP OP CAR RHAB W/ECG: CPT

## 2024-07-23 PROCEDURE — 93797 PHYS/QHP OP CAR RHAB WO ECG: CPT

## 2024-07-30 ENCOUNTER — HOSPITAL ENCOUNTER (OUTPATIENT)
Facility: HOSPITAL | Age: 79
Setting detail: RECURRING SERIES
Discharge: HOME | End: 2024-08-02
Attending: INTERNAL MEDICINE
Payer: MEDICARE

## 2024-07-30 VITALS — BODY MASS INDEX: 26.86 KG/M2 | WEIGHT: 187.2 LBS

## 2024-07-30 PROCEDURE — 93798 PHYS/QHP OP CAR RHAB W/ECG: CPT

## 2024-07-30 PROCEDURE — 93797 PHYS/QHP OP CAR RHAB WO ECG: CPT

## 2024-07-30 ASSESSMENT — EXERCISE STRESS TEST
PEAK_RPE: 12
PEAK_METS: 1.8

## 2024-08-04 ENCOUNTER — HOSPITAL ENCOUNTER (INPATIENT)
Facility: HOSPITAL | Age: 79
LOS: 3 days | Discharge: HOME | End: 2024-08-07
Attending: STUDENT IN AN ORGANIZED HEALTH CARE EDUCATION/TRAINING PROGRAM | Admitting: INTERNAL MEDICINE
Payer: MEDICARE

## 2024-08-04 ENCOUNTER — APPOINTMENT (OUTPATIENT)
Facility: HOSPITAL | Age: 79
End: 2024-08-04
Payer: MEDICARE

## 2024-08-04 ENCOUNTER — ANESTHESIA EVENT (OUTPATIENT)
Facility: HOSPITAL | Age: 79
End: 2024-08-04
Payer: MEDICARE

## 2024-08-04 ENCOUNTER — ANESTHESIA (OUTPATIENT)
Facility: HOSPITAL | Age: 79
End: 2024-08-04
Payer: MEDICARE

## 2024-08-04 DIAGNOSIS — K35.201 ACUTE APPENDICITIS WITH PERFORATION AND GENERALIZED PERITONITIS, WITHOUT ABSCESS OR GANGRENE: Primary | ICD-10-CM

## 2024-08-04 PROBLEM — K35.32 RUPTURED APPENDICITIS: Status: ACTIVE | Noted: 2024-08-04

## 2024-08-04 LAB
ALBUMIN SERPL-MCNC: 3.5 G/DL (ref 3.5–5)
ALBUMIN/GLOB SERPL: 0.9 (ref 1.1–2.2)
ALP SERPL-CCNC: 63 U/L (ref 45–117)
ALT SERPL-CCNC: 35 U/L (ref 12–78)
ANION GAP SERPL CALC-SCNC: 6 MMOL/L (ref 5–15)
APPEARANCE UR: CLEAR
AST SERPL-CCNC: 31 U/L (ref 15–37)
BACTERIA URNS QL MICRO: NEGATIVE /HPF
BASOPHILS # BLD: 0 K/UL (ref 0–0.1)
BASOPHILS NFR BLD: 0 % (ref 0–1)
BILIRUB SERPL-MCNC: 2.1 MG/DL (ref 0.2–1)
BILIRUB UR QL: NEGATIVE
BUN SERPL-MCNC: 21 MG/DL (ref 6–20)
BUN/CREAT SERPL: 12 (ref 12–20)
CALCIUM SERPL-MCNC: 9.4 MG/DL (ref 8.5–10.1)
CHLORIDE SERPL-SCNC: 107 MMOL/L (ref 97–108)
CO2 SERPL-SCNC: 29 MMOL/L (ref 21–32)
COLOR UR: NORMAL
CREAT SERPL-MCNC: 1.69 MG/DL (ref 0.7–1.3)
DIFFERENTIAL METHOD BLD: ABNORMAL
EOSINOPHIL # BLD: 0 K/UL (ref 0–0.4)
EOSINOPHIL NFR BLD: 0 % (ref 0–7)
EPITH CASTS URNS QL MICRO: NORMAL /LPF
ERYTHROCYTE [DISTWIDTH] IN BLOOD BY AUTOMATED COUNT: 15.9 % (ref 11.5–14.5)
GLOBULIN SER CALC-MCNC: 3.7 G/DL (ref 2–4)
GLUCOSE SERPL-MCNC: 123 MG/DL (ref 65–100)
GLUCOSE UR STRIP.AUTO-MCNC: NEGATIVE MG/DL
HCT VFR BLD AUTO: 42.9 % (ref 36.6–50.3)
HGB BLD-MCNC: 14.6 G/DL (ref 12.1–17)
HGB UR QL STRIP: NEGATIVE
HYALINE CASTS URNS QL MICRO: NORMAL /LPF (ref 0–5)
IMM GRANULOCYTES # BLD AUTO: 0 K/UL
IMM GRANULOCYTES NFR BLD AUTO: 0 %
KETONES UR QL STRIP.AUTO: NEGATIVE MG/DL
LEUKOCYTE ESTERASE UR QL STRIP.AUTO: NEGATIVE
LIPASE SERPL-CCNC: 14 U/L (ref 13–75)
LYMPHOCYTES # BLD: 0.3 K/UL (ref 0.8–3.5)
LYMPHOCYTES NFR BLD: 3 % (ref 12–49)
MCH RBC QN AUTO: 28.9 PG (ref 26–34)
MCHC RBC AUTO-ENTMCNC: 34 G/DL (ref 30–36.5)
MCV RBC AUTO: 84.8 FL (ref 80–99)
MONOCYTES # BLD: 0.1 K/UL (ref 0–1)
MONOCYTES NFR BLD: 1 % (ref 5–13)
NEUTS BAND NFR BLD MANUAL: 3 % (ref 0–6)
NEUTS SEG # BLD: 11.2 K/UL (ref 1.8–8)
NEUTS SEG NFR BLD: 93 % (ref 32–75)
NITRITE UR QL STRIP.AUTO: NEGATIVE
NRBC # BLD: 0 K/UL (ref 0–0.01)
NRBC BLD-RTO: 0 PER 100 WBC
PH UR STRIP: 6.5 (ref 5–8)
PLATELET # BLD AUTO: 141 K/UL (ref 150–400)
PMV BLD AUTO: 11.7 FL (ref 8.9–12.9)
POTASSIUM SERPL-SCNC: 3.5 MMOL/L (ref 3.5–5.1)
PROT SERPL-MCNC: 7.2 G/DL (ref 6.4–8.2)
PROT UR STRIP-MCNC: NEGATIVE MG/DL
RBC # BLD AUTO: 5.06 M/UL (ref 4.1–5.7)
RBC #/AREA URNS HPF: NORMAL /HPF (ref 0–5)
RBC MORPH BLD: ABNORMAL
RBC MORPH BLD: ABNORMAL
SODIUM SERPL-SCNC: 142 MMOL/L (ref 136–145)
SP GR UR REFRACTOMETRY: 1.01 (ref 1–1.03)
SPECIMEN HOLD: NORMAL
UROBILINOGEN UR QL STRIP.AUTO: 0.2 EU/DL (ref 0.2–1)
WBC # BLD AUTO: 11.6 K/UL (ref 4.1–11.1)
WBC URNS QL MICRO: NORMAL /HPF (ref 0–4)

## 2024-08-04 PROCEDURE — 3600000013 HC SURGERY LEVEL 3 ADDTL 15MIN: Performed by: SURGERY

## 2024-08-04 PROCEDURE — 83690 ASSAY OF LIPASE: CPT

## 2024-08-04 PROCEDURE — 2580000003 HC RX 258: Performed by: STUDENT IN AN ORGANIZED HEALTH CARE EDUCATION/TRAINING PROGRAM

## 2024-08-04 PROCEDURE — 36415 COLL VENOUS BLD VENIPUNCTURE: CPT

## 2024-08-04 PROCEDURE — 81001 URINALYSIS AUTO W/SCOPE: CPT

## 2024-08-04 PROCEDURE — 2580000003 HC RX 258: Performed by: NURSE ANESTHETIST, CERTIFIED REGISTERED

## 2024-08-04 PROCEDURE — 6360000002 HC RX W HCPCS: Performed by: SURGERY

## 2024-08-04 PROCEDURE — 85025 COMPLETE CBC W/AUTO DIFF WBC: CPT

## 2024-08-04 PROCEDURE — 6370000000 HC RX 637 (ALT 250 FOR IP): Performed by: INTERNAL MEDICINE

## 2024-08-04 PROCEDURE — 6360000002 HC RX W HCPCS: Performed by: NURSE ANESTHETIST, CERTIFIED REGISTERED

## 2024-08-04 PROCEDURE — 2709999900 HC NON-CHARGEABLE SUPPLY: Performed by: SURGERY

## 2024-08-04 PROCEDURE — 0DTJ4ZZ RESECTION OF APPENDIX, PERCUTANEOUS ENDOSCOPIC APPROACH: ICD-10-PCS | Performed by: SURGERY

## 2024-08-04 PROCEDURE — 3600000003 HC SURGERY LEVEL 3 BASE: Performed by: SURGERY

## 2024-08-04 PROCEDURE — 6360000004 HC RX CONTRAST MEDICATION: Performed by: RADIOLOGY

## 2024-08-04 PROCEDURE — 2580000003 HC RX 258: Performed by: SURGERY

## 2024-08-04 PROCEDURE — 7100000001 HC PACU RECOVERY - ADDTL 15 MIN: Performed by: SURGERY

## 2024-08-04 PROCEDURE — 6360000002 HC RX W HCPCS: Performed by: EMERGENCY MEDICINE

## 2024-08-04 PROCEDURE — 96374 THER/PROPH/DIAG INJ IV PUSH: CPT

## 2024-08-04 PROCEDURE — 44970 LAPAROSCOPY APPENDECTOMY: CPT | Performed by: SURGERY

## 2024-08-04 PROCEDURE — 99222 1ST HOSP IP/OBS MODERATE 55: CPT | Performed by: SURGERY

## 2024-08-04 PROCEDURE — 74177 CT ABD & PELVIS W/CONTRAST: CPT

## 2024-08-04 PROCEDURE — 3700000001 HC ADD 15 MINUTES (ANESTHESIA): Performed by: SURGERY

## 2024-08-04 PROCEDURE — 80053 COMPREHEN METABOLIC PANEL: CPT

## 2024-08-04 PROCEDURE — 3700000000 HC ANESTHESIA ATTENDED CARE: Performed by: SURGERY

## 2024-08-04 PROCEDURE — 2580000003 HC RX 258: Performed by: INTERNAL MEDICINE

## 2024-08-04 PROCEDURE — 2580000003 HC RX 258: Performed by: EMERGENCY MEDICINE

## 2024-08-04 PROCEDURE — 6360000002 HC RX W HCPCS: Performed by: STUDENT IN AN ORGANIZED HEALTH CARE EDUCATION/TRAINING PROGRAM

## 2024-08-04 PROCEDURE — 99285 EMERGENCY DEPT VISIT HI MDM: CPT

## 2024-08-04 PROCEDURE — 2500000003 HC RX 250 WO HCPCS: Performed by: SURGERY

## 2024-08-04 PROCEDURE — 2720000010 HC SURG SUPPLY STERILE: Performed by: SURGERY

## 2024-08-04 PROCEDURE — 88304 TISSUE EXAM BY PATHOLOGIST: CPT

## 2024-08-04 PROCEDURE — 2500000003 HC RX 250 WO HCPCS: Performed by: NURSE ANESTHETIST, CERTIFIED REGISTERED

## 2024-08-04 PROCEDURE — 7100000000 HC PACU RECOVERY - FIRST 15 MIN: Performed by: SURGERY

## 2024-08-04 PROCEDURE — 1200000000 HC SEMI PRIVATE

## 2024-08-04 RX ORDER — ACETAMINOPHEN 325 MG/1
650 TABLET ORAL EVERY 6 HOURS PRN
Status: DISCONTINUED | OUTPATIENT
Start: 2024-08-04 | End: 2024-08-07 | Stop reason: HOSPADM

## 2024-08-04 RX ORDER — PROPOFOL 10 MG/ML
INJECTION, EMULSION INTRAVENOUS PRN
Status: DISCONTINUED | OUTPATIENT
Start: 2024-08-04 | End: 2024-08-04 | Stop reason: SDUPTHER

## 2024-08-04 RX ORDER — SODIUM CHLORIDE 9 MG/ML
INJECTION, SOLUTION INTRAVENOUS CONTINUOUS
Status: DISCONTINUED | OUTPATIENT
Start: 2024-08-04 | End: 2024-08-06

## 2024-08-04 RX ORDER — ONDANSETRON 2 MG/ML
4 INJECTION INTRAMUSCULAR; INTRAVENOUS EVERY 6 HOURS PRN
Status: DISCONTINUED | OUTPATIENT
Start: 2024-08-04 | End: 2024-08-07 | Stop reason: HOSPADM

## 2024-08-04 RX ORDER — NALOXONE HYDROCHLORIDE 0.4 MG/ML
INJECTION, SOLUTION INTRAMUSCULAR; INTRAVENOUS; SUBCUTANEOUS PRN
Status: DISCONTINUED | OUTPATIENT
Start: 2024-08-04 | End: 2024-08-05 | Stop reason: HOSPADM

## 2024-08-04 RX ORDER — FENTANYL CITRATE 50 UG/ML
25 INJECTION, SOLUTION INTRAMUSCULAR; INTRAVENOUS
Status: DISCONTINUED | OUTPATIENT
Start: 2024-08-04 | End: 2024-08-04 | Stop reason: HOSPADM

## 2024-08-04 RX ORDER — DIPHENHYDRAMINE HYDROCHLORIDE 50 MG/ML
12.5 INJECTION INTRAMUSCULAR; INTRAVENOUS
Status: DISCONTINUED | OUTPATIENT
Start: 2024-08-04 | End: 2024-08-05 | Stop reason: HOSPADM

## 2024-08-04 RX ORDER — ONDANSETRON 2 MG/ML
INJECTION INTRAMUSCULAR; INTRAVENOUS PRN
Status: DISCONTINUED | OUTPATIENT
Start: 2024-08-04 | End: 2024-08-04 | Stop reason: SDUPTHER

## 2024-08-04 RX ORDER — OXYCODONE HYDROCHLORIDE 5 MG/1
5 TABLET ORAL EVERY 4 HOURS PRN
Status: DISCONTINUED | OUTPATIENT
Start: 2024-08-04 | End: 2024-08-07 | Stop reason: HOSPADM

## 2024-08-04 RX ORDER — LORAZEPAM 2 MG/ML
0.5 INJECTION INTRAMUSCULAR
Status: DISCONTINUED | OUTPATIENT
Start: 2024-08-04 | End: 2024-08-05 | Stop reason: HOSPADM

## 2024-08-04 RX ORDER — LIDOCAINE HYDROCHLORIDE 20 MG/ML
INJECTION, SOLUTION EPIDURAL; INFILTRATION; INTRACAUDAL; PERINEURAL PRN
Status: DISCONTINUED | OUTPATIENT
Start: 2024-08-04 | End: 2024-08-04 | Stop reason: SDUPTHER

## 2024-08-04 RX ORDER — BRIMONIDINE TARTRATE 2 MG/ML
1 SOLUTION/ DROPS OPHTHALMIC DAILY
Status: DISCONTINUED | OUTPATIENT
Start: 2024-08-05 | End: 2024-08-07 | Stop reason: HOSPADM

## 2024-08-04 RX ORDER — FENTANYL CITRATE 50 UG/ML
50 INJECTION, SOLUTION INTRAMUSCULAR; INTRAVENOUS
Status: DISCONTINUED | OUTPATIENT
Start: 2024-08-04 | End: 2024-08-04 | Stop reason: HOSPADM

## 2024-08-04 RX ORDER — BUPIVACAINE HYDROCHLORIDE AND EPINEPHRINE 2.5; 5 MG/ML; UG/ML
INJECTION, SOLUTION EPIDURAL; INFILTRATION; INTRACAUDAL; PERINEURAL PRN
Status: DISCONTINUED | OUTPATIENT
Start: 2024-08-04 | End: 2024-08-04 | Stop reason: HOSPADM

## 2024-08-04 RX ORDER — DEXMEDETOMIDINE HYDROCHLORIDE 100 UG/ML
INJECTION, SOLUTION INTRAVENOUS PRN
Status: DISCONTINUED | OUTPATIENT
Start: 2024-08-04 | End: 2024-08-04 | Stop reason: SDUPTHER

## 2024-08-04 RX ORDER — OXYCODONE HYDROCHLORIDE 5 MG/1
5 TABLET ORAL PRN
Status: ACTIVE | OUTPATIENT
Start: 2024-08-04 | End: 2024-08-04

## 2024-08-04 RX ORDER — SODIUM CHLORIDE 0.9 % (FLUSH) 0.9 %
5-40 SYRINGE (ML) INJECTION PRN
Status: DISCONTINUED | OUTPATIENT
Start: 2024-08-04 | End: 2024-08-05 | Stop reason: HOSPADM

## 2024-08-04 RX ORDER — SODIUM CHLORIDE 0.9 % (FLUSH) 0.9 %
5-40 SYRINGE (ML) INJECTION EVERY 12 HOURS SCHEDULED
Status: DISCONTINUED | OUTPATIENT
Start: 2024-08-04 | End: 2024-08-04 | Stop reason: HOSPADM

## 2024-08-04 RX ORDER — SODIUM CHLORIDE, SODIUM LACTATE, POTASSIUM CHLORIDE, CALCIUM CHLORIDE 600; 310; 30; 20 MG/100ML; MG/100ML; MG/100ML; MG/100ML
INJECTION, SOLUTION INTRAVENOUS CONTINUOUS
Status: DISCONTINUED | OUTPATIENT
Start: 2024-08-04 | End: 2024-08-05 | Stop reason: HOSPADM

## 2024-08-04 RX ORDER — LIDOCAINE HYDROCHLORIDE 10 MG/ML
1 INJECTION, SOLUTION EPIDURAL; INFILTRATION; INTRACAUDAL; PERINEURAL
Status: DISCONTINUED | OUTPATIENT
Start: 2024-08-04 | End: 2024-08-04 | Stop reason: HOSPADM

## 2024-08-04 RX ORDER — SODIUM CHLORIDE 0.9 % (FLUSH) 0.9 %
5-40 SYRINGE (ML) INJECTION EVERY 12 HOURS SCHEDULED
Status: DISCONTINUED | OUTPATIENT
Start: 2024-08-04 | End: 2024-08-07 | Stop reason: HOSPADM

## 2024-08-04 RX ORDER — SODIUM CHLORIDE 9 MG/ML
INJECTION, SOLUTION INTRAVENOUS PRN
Status: DISCONTINUED | OUTPATIENT
Start: 2024-08-04 | End: 2024-08-04 | Stop reason: HOSPADM

## 2024-08-04 RX ORDER — FINASTERIDE 5 MG/1
5 TABLET, FILM COATED ORAL DAILY
Status: DISCONTINUED | OUTPATIENT
Start: 2024-08-05 | End: 2024-08-07 | Stop reason: HOSPADM

## 2024-08-04 RX ORDER — SODIUM CHLORIDE 9 MG/ML
INJECTION, SOLUTION INTRAVENOUS PRN
Status: DISCONTINUED | OUTPATIENT
Start: 2024-08-04 | End: 2024-08-07 | Stop reason: HOSPADM

## 2024-08-04 RX ORDER — MIDAZOLAM HYDROCHLORIDE 2 MG/2ML
2 INJECTION, SOLUTION INTRAMUSCULAR; INTRAVENOUS
Status: DISCONTINUED | OUTPATIENT
Start: 2024-08-04 | End: 2024-08-04 | Stop reason: HOSPADM

## 2024-08-04 RX ORDER — ALBUTEROL SULFATE 2.5 MG/3ML
2.5 SOLUTION RESPIRATORY (INHALATION)
Status: DISCONTINUED | OUTPATIENT
Start: 2024-08-04 | End: 2024-08-04 | Stop reason: HOSPADM

## 2024-08-04 RX ORDER — DEXAMETHASONE SODIUM PHOSPHATE 4 MG/ML
INJECTION, SOLUTION INTRA-ARTICULAR; INTRALESIONAL; INTRAMUSCULAR; INTRAVENOUS; SOFT TISSUE PRN
Status: DISCONTINUED | OUTPATIENT
Start: 2024-08-04 | End: 2024-08-04 | Stop reason: SDUPTHER

## 2024-08-04 RX ORDER — POLYETHYLENE GLYCOL 3350 17 G/17G
17 POWDER, FOR SOLUTION ORAL DAILY PRN
Status: DISCONTINUED | OUTPATIENT
Start: 2024-08-04 | End: 2024-08-07 | Stop reason: HOSPADM

## 2024-08-04 RX ORDER — TIMOLOL MALEATE 5 MG/ML
1 SOLUTION/ DROPS OPHTHALMIC DAILY
Status: DISCONTINUED | OUTPATIENT
Start: 2024-08-04 | End: 2024-08-07 | Stop reason: HOSPADM

## 2024-08-04 RX ORDER — CEFAZOLIN SODIUM 1 G/3ML
INJECTION, POWDER, FOR SOLUTION INTRAMUSCULAR; INTRAVENOUS PRN
Status: DISCONTINUED | OUTPATIENT
Start: 2024-08-04 | End: 2024-08-04 | Stop reason: SDUPTHER

## 2024-08-04 RX ORDER — MEPERIDINE HYDROCHLORIDE 25 MG/ML
12.5 INJECTION INTRAMUSCULAR; INTRAVENOUS; SUBCUTANEOUS EVERY 5 MIN PRN
Status: DISCONTINUED | OUTPATIENT
Start: 2024-08-04 | End: 2024-08-05 | Stop reason: HOSPADM

## 2024-08-04 RX ORDER — HYDRALAZINE HYDROCHLORIDE 20 MG/ML
10 INJECTION INTRAMUSCULAR; INTRAVENOUS
Status: DISCONTINUED | OUTPATIENT
Start: 2024-08-04 | End: 2024-08-05 | Stop reason: HOSPADM

## 2024-08-04 RX ORDER — HYDROMORPHONE HYDROCHLORIDE 1 MG/ML
1 INJECTION, SOLUTION INTRAMUSCULAR; INTRAVENOUS; SUBCUTANEOUS
Status: DISCONTINUED | OUTPATIENT
Start: 2024-08-04 | End: 2024-08-07 | Stop reason: HOSPADM

## 2024-08-04 RX ORDER — ONDANSETRON 4 MG/1
4 TABLET, ORALLY DISINTEGRATING ORAL EVERY 8 HOURS PRN
Status: DISCONTINUED | OUTPATIENT
Start: 2024-08-04 | End: 2024-08-07 | Stop reason: HOSPADM

## 2024-08-04 RX ORDER — SUCCINYLCHOLINE/SOD CL,ISO/PF 200MG/10ML
SYRINGE (ML) INTRAVENOUS PRN
Status: DISCONTINUED | OUTPATIENT
Start: 2024-08-04 | End: 2024-08-04 | Stop reason: SDUPTHER

## 2024-08-04 RX ORDER — FENTANYL CITRATE 50 UG/ML
INJECTION, SOLUTION INTRAMUSCULAR; INTRAVENOUS PRN
Status: DISCONTINUED | OUTPATIENT
Start: 2024-08-04 | End: 2024-08-04 | Stop reason: SDUPTHER

## 2024-08-04 RX ORDER — ACETAMINOPHEN 650 MG/1
650 SUPPOSITORY RECTAL EVERY 6 HOURS PRN
Status: DISCONTINUED | OUTPATIENT
Start: 2024-08-04 | End: 2024-08-07 | Stop reason: HOSPADM

## 2024-08-04 RX ORDER — SODIUM CHLORIDE 0.9 % (FLUSH) 0.9 %
5-40 SYRINGE (ML) INJECTION PRN
Status: DISCONTINUED | OUTPATIENT
Start: 2024-08-04 | End: 2024-08-04 | Stop reason: HOSPADM

## 2024-08-04 RX ORDER — SODIUM CHLORIDE, SODIUM LACTATE, POTASSIUM CHLORIDE, CALCIUM CHLORIDE 600; 310; 30; 20 MG/100ML; MG/100ML; MG/100ML; MG/100ML
INJECTION, SOLUTION INTRAVENOUS CONTINUOUS
Status: DISCONTINUED | OUTPATIENT
Start: 2024-08-04 | End: 2024-08-04 | Stop reason: HOSPADM

## 2024-08-04 RX ORDER — 0.9 % SODIUM CHLORIDE 0.9 %
1000 INTRAVENOUS SOLUTION INTRAVENOUS ONCE
Status: COMPLETED | OUTPATIENT
Start: 2024-08-04 | End: 2024-08-04

## 2024-08-04 RX ORDER — OXYCODONE HYDROCHLORIDE AND ACETAMINOPHEN 5; 325 MG/1; MG/1
1-2 TABLET ORAL EVERY 6 HOURS PRN
Qty: 20 TABLET | Refills: 0 | Status: SHIPPED | OUTPATIENT
Start: 2024-08-04 | End: 2024-08-08

## 2024-08-04 RX ORDER — ROCURONIUM BROMIDE 10 MG/ML
INJECTION, SOLUTION INTRAVENOUS PRN
Status: DISCONTINUED | OUTPATIENT
Start: 2024-08-04 | End: 2024-08-04 | Stop reason: SDUPTHER

## 2024-08-04 RX ORDER — SODIUM CHLORIDE 0.9 % (FLUSH) 0.9 %
5-40 SYRINGE (ML) INJECTION EVERY 12 HOURS SCHEDULED
Status: DISCONTINUED | OUTPATIENT
Start: 2024-08-04 | End: 2024-08-05 | Stop reason: HOSPADM

## 2024-08-04 RX ORDER — PROCHLORPERAZINE EDISYLATE 5 MG/ML
5 INJECTION INTRAMUSCULAR; INTRAVENOUS
Status: DISCONTINUED | OUTPATIENT
Start: 2024-08-04 | End: 2024-08-05 | Stop reason: HOSPADM

## 2024-08-04 RX ORDER — DORZOLAMIDE HCL 20 MG/ML
1 SOLUTION/ DROPS OPHTHALMIC 3 TIMES DAILY
Status: DISCONTINUED | OUTPATIENT
Start: 2024-08-04 | End: 2024-08-07 | Stop reason: HOSPADM

## 2024-08-04 RX ORDER — SODIUM CHLORIDE 0.9 % (FLUSH) 0.9 %
5-40 SYRINGE (ML) INJECTION PRN
Status: DISCONTINUED | OUTPATIENT
Start: 2024-08-04 | End: 2024-08-07 | Stop reason: HOSPADM

## 2024-08-04 RX ORDER — CARVEDILOL 12.5 MG/1
25 TABLET ORAL 2 TIMES DAILY
Status: DISCONTINUED | OUTPATIENT
Start: 2024-08-05 | End: 2024-08-07 | Stop reason: HOSPADM

## 2024-08-04 RX ORDER — FENTANYL CITRATE 50 UG/ML
50 INJECTION, SOLUTION INTRAMUSCULAR; INTRAVENOUS EVERY 5 MIN PRN
Status: DISCONTINUED | OUTPATIENT
Start: 2024-08-04 | End: 2024-08-05 | Stop reason: HOSPADM

## 2024-08-04 RX ORDER — PHENYLEPHRINE HCL IN 0.9% NACL 0.4MG/10ML
SYRINGE (ML) INTRAVENOUS PRN
Status: DISCONTINUED | OUTPATIENT
Start: 2024-08-04 | End: 2024-08-04 | Stop reason: SDUPTHER

## 2024-08-04 RX ORDER — SODIUM CHLORIDE, SODIUM LACTATE, POTASSIUM CHLORIDE, CALCIUM CHLORIDE 600; 310; 30; 20 MG/100ML; MG/100ML; MG/100ML; MG/100ML
INJECTION, SOLUTION INTRAVENOUS CONTINUOUS PRN
Status: DISCONTINUED | OUTPATIENT
Start: 2024-08-04 | End: 2024-08-04 | Stop reason: SDUPTHER

## 2024-08-04 RX ORDER — ROSUVASTATIN CALCIUM 40 MG/1
40 TABLET, COATED ORAL NIGHTLY
Status: DISCONTINUED | OUTPATIENT
Start: 2024-08-05 | End: 2024-08-07 | Stop reason: HOSPADM

## 2024-08-04 RX ORDER — OXYCODONE HYDROCHLORIDE 5 MG/1
10 TABLET ORAL EVERY 4 HOURS PRN
Status: DISCONTINUED | OUTPATIENT
Start: 2024-08-04 | End: 2024-08-07 | Stop reason: HOSPADM

## 2024-08-04 RX ORDER — SODIUM CHLORIDE 9 MG/ML
INJECTION, SOLUTION INTRAVENOUS PRN
Status: DISCONTINUED | OUTPATIENT
Start: 2024-08-04 | End: 2024-08-05 | Stop reason: HOSPADM

## 2024-08-04 RX ORDER — HYDROMORPHONE HYDROCHLORIDE 1 MG/ML
0.5 INJECTION, SOLUTION INTRAMUSCULAR; INTRAVENOUS; SUBCUTANEOUS
Status: DISCONTINUED | OUTPATIENT
Start: 2024-08-04 | End: 2024-08-07 | Stop reason: HOSPADM

## 2024-08-04 RX ORDER — LATANOPROST 50 UG/ML
1 SOLUTION/ DROPS OPHTHALMIC NIGHTLY
Status: DISCONTINUED | OUTPATIENT
Start: 2024-08-04 | End: 2024-08-07 | Stop reason: HOSPADM

## 2024-08-04 RX ORDER — ONDANSETRON 2 MG/ML
4 INJECTION INTRAMUSCULAR; INTRAVENOUS
Status: COMPLETED | OUTPATIENT
Start: 2024-08-04 | End: 2024-08-04

## 2024-08-04 RX ORDER — HYDROMORPHONE HYDROCHLORIDE 1 MG/ML
0.25 INJECTION, SOLUTION INTRAMUSCULAR; INTRAVENOUS; SUBCUTANEOUS EVERY 5 MIN PRN
Status: DISCONTINUED | OUTPATIENT
Start: 2024-08-04 | End: 2024-08-04

## 2024-08-04 RX ORDER — OXYCODONE HYDROCHLORIDE 5 MG/1
10 TABLET ORAL PRN
Status: ACTIVE | OUTPATIENT
Start: 2024-08-04 | End: 2024-08-04

## 2024-08-04 RX ORDER — LABETALOL HYDROCHLORIDE 5 MG/ML
10 INJECTION, SOLUTION INTRAVENOUS
Status: DISCONTINUED | OUTPATIENT
Start: 2024-08-04 | End: 2024-08-05 | Stop reason: HOSPADM

## 2024-08-04 RX ADMIN — DEXMEDETOMIDINE HYDROCHLORIDE 4 MCG: 100 INJECTION, SOLUTION, CONCENTRATE INTRAVENOUS at 21:12

## 2024-08-04 RX ADMIN — DEXMEDETOMIDINE HYDROCHLORIDE 4 MCG: 100 INJECTION, SOLUTION, CONCENTRATE INTRAVENOUS at 21:14

## 2024-08-04 RX ADMIN — Medication 100 MG: at 21:20

## 2024-08-04 RX ADMIN — Medication 100 MG: at 21:19

## 2024-08-04 RX ADMIN — PROPOFOL 20 MG: 10 INJECTION, EMULSION INTRAVENOUS at 21:19

## 2024-08-04 RX ADMIN — PHENYLEPHRINE HYDROCHLORIDE 40 MCG/MIN: 10 INJECTION INTRAVENOUS at 21:18

## 2024-08-04 RX ADMIN — TIMOLOL MALEATE 1 DROP: 5 SOLUTION OPHTHALMIC at 19:03

## 2024-08-04 RX ADMIN — SODIUM CHLORIDE, PRESERVATIVE FREE 10 ML: 5 INJECTION INTRAVENOUS at 23:40

## 2024-08-04 RX ADMIN — PIPERACILLIN AND TAZOBACTAM 4500 MG: 4; .5 INJECTION, POWDER, LYOPHILIZED, FOR SOLUTION INTRAVENOUS at 18:22

## 2024-08-04 RX ADMIN — Medication 120 MCG: at 21:35

## 2024-08-04 RX ADMIN — FENTANYL CITRATE 25 MCG: 50 INJECTION, SOLUTION INTRAMUSCULAR; INTRAVENOUS at 21:43

## 2024-08-04 RX ADMIN — Medication 120 MCG: at 22:39

## 2024-08-04 RX ADMIN — ONDANSETRON 4 MG: 2 INJECTION INTRAMUSCULAR; INTRAVENOUS at 23:43

## 2024-08-04 RX ADMIN — PIPERACILLIN AND TAZOBACTAM 3375 MG: 3; .375 INJECTION, POWDER, FOR SOLUTION INTRAVENOUS at 23:50

## 2024-08-04 RX ADMIN — Medication 80 MCG: at 21:37

## 2024-08-04 RX ADMIN — SODIUM CHLORIDE 1000 ML: 9 INJECTION, SOLUTION INTRAVENOUS at 13:35

## 2024-08-04 RX ADMIN — CEFAZOLIN 2 G: 330 INJECTION, POWDER, FOR SOLUTION INTRAMUSCULAR; INTRAVENOUS at 21:34

## 2024-08-04 RX ADMIN — SODIUM CHLORIDE, PRESERVATIVE FREE 10 ML: 5 INJECTION INTRAVENOUS at 21:01

## 2024-08-04 RX ADMIN — SODIUM CHLORIDE, POTASSIUM CHLORIDE, SODIUM LACTATE AND CALCIUM CHLORIDE: 600; 310; 30; 20 INJECTION, SOLUTION INTRAVENOUS at 20:46

## 2024-08-04 RX ADMIN — PROPOFOL 20 MG: 10 INJECTION, EMULSION INTRAVENOUS at 21:20

## 2024-08-04 RX ADMIN — SODIUM CHLORIDE: 9 INJECTION, SOLUTION INTRAVENOUS at 19:03

## 2024-08-04 RX ADMIN — Medication 120 MCG: at 21:18

## 2024-08-04 RX ADMIN — PROPOFOL 60 MG: 10 INJECTION, EMULSION INTRAVENOUS at 21:18

## 2024-08-04 RX ADMIN — LIDOCAINE HYDROCHLORIDE 100 MG: 20 INJECTION, SOLUTION EPIDURAL; INFILTRATION; INTRACAUDAL; PERINEURAL at 21:18

## 2024-08-04 RX ADMIN — ROCURONIUM BROMIDE 10 MG: 10 INJECTION, SOLUTION INTRAVENOUS at 21:18

## 2024-08-04 RX ADMIN — FENTANYL CITRATE 25 MCG: 50 INJECTION, SOLUTION INTRAMUSCULAR; INTRAVENOUS at 21:17

## 2024-08-04 RX ADMIN — SUGAMMADEX 200 MG: 100 INJECTION, SOLUTION INTRAVENOUS at 22:35

## 2024-08-04 RX ADMIN — DEXAMETHASONE SODIUM PHOSPHATE 4 MG: 4 INJECTION INTRA-ARTICULAR; INTRALESIONAL; INTRAMUSCULAR; INTRAVENOUS; SOFT TISSUE at 21:25

## 2024-08-04 RX ADMIN — ONDANSETRON 4 MG: 2 INJECTION INTRAMUSCULAR; INTRAVENOUS at 21:25

## 2024-08-04 RX ADMIN — ROCURONIUM BROMIDE 20 MG: 10 INJECTION, SOLUTION INTRAVENOUS at 21:27

## 2024-08-04 RX ADMIN — IOPAMIDOL 85 ML: 755 INJECTION, SOLUTION INTRAVENOUS at 14:34

## 2024-08-04 RX ADMIN — PANTOPRAZOLE SODIUM 40 MG: 40 INJECTION, POWDER, FOR SOLUTION INTRAVENOUS at 13:35

## 2024-08-04 ASSESSMENT — PAIN - FUNCTIONAL ASSESSMENT
PAIN_FUNCTIONAL_ASSESSMENT: NONE - DENIES PAIN
PAIN_FUNCTIONAL_ASSESSMENT: 0-10
PAIN_FUNCTIONAL_ASSESSMENT: PREVENTS OR INTERFERES SOME ACTIVE ACTIVITIES AND ADLS
PAIN_FUNCTIONAL_ASSESSMENT: NONE - DENIES PAIN
PAIN_FUNCTIONAL_ASSESSMENT: NONE - DENIES PAIN

## 2024-08-04 ASSESSMENT — PAIN SCALES - GENERAL: PAINLEVEL_OUTOF10: 6

## 2024-08-04 ASSESSMENT — PAIN DESCRIPTION - DESCRIPTORS: DESCRIPTORS: SHARP

## 2024-08-04 ASSESSMENT — LIFESTYLE VARIABLES
HOW MANY STANDARD DRINKS CONTAINING ALCOHOL DO YOU HAVE ON A TYPICAL DAY: PATIENT DOES NOT DRINK
HOW OFTEN DO YOU HAVE A DRINK CONTAINING ALCOHOL: NEVER

## 2024-08-04 ASSESSMENT — PAIN DESCRIPTION - ORIENTATION: ORIENTATION: LOWER

## 2024-08-04 ASSESSMENT — PAIN DESCRIPTION - LOCATION: LOCATION: ABDOMEN

## 2024-08-04 NOTE — ED PROVIDER NOTES
[facility] EMERGENCY DEP  EMERGENCY DEPARTMENT ENCOUNTER      Pt Name: Brent Sabillon Jr.  MRN: [id number]  Birthdate 1945  Date of evaluation: 8/4/2024  Provider: Galileo Loredo MD    CHIEF COMPLAINT       Chief Complaint   Patient presents with    Abdominal Pain         HISTORY OF PRESENT ILLNESS   (Location/Symptom, Timing/Onset, Context/Setting, Quality, Duration, Modifying Factors, Severity)  Note limiting factors.   Patient is an 79-year-old male presenting the emergency department with lower abdominal pain.  Patient woke up this morning with lower abdominal pain that he describes as crampy intermittent at times.  He had taken 2 laxatives and tried to have a bowel movement he said he was able to pass a small amount of stool continues to have lower abdominal pain worse with movement.  He had drinking some water and then had an episode of nausea and vomiting.  He denies any fevers, chills chest pain or shortness of breath.            Review of External Medical Records:     Nursing Notes were reviewed.    REVIEW OF SYSTEMS    (2-9 systems for level 4, 10 or more for level 5)     Review of Systems    Except as noted above the remainder of the review of systems was reviewed and negative.       PAST MEDICAL HISTORY     Past Medical History:   Diagnosis Date    Atrial fibrillation (HCC)     CKD (chronic kidney disease)     Heart failure (HCC)     Hypertension          SURGICAL HISTORY       Past Surgical History:   Procedure Laterality Date    CARDIAC PROCEDURE N/A 03/25/2024    Left and right heart cath / coronary angiography performed by Radha Allen MD at [facility] CARDIAC CATH LAB    CARDIAC PROCEDURE N/A 03/25/2024    Rodri during cath case performed by Radha Allen MD at [facility] CARDIAC CATH LAB    CARDIAC SURGERY N/A 05/09/2024    TRANSCATHETER EDGE TO EDGE REPAIR performed by Radha Allen MD at [facility] OPEN HEART    CATARACT EXTRACTION Bilateral     ENDOSCOPY, COLON, DIAGNOSTIC      HEMORRHOID SURGERY         Height: 1.778 m (5' 10\")             Medical Decision Making  Acute appendicitis, SBO, constipation.  79-year-old male presenting to the emergency department with sudden onset lower abdominal pain patient has hypoactive bowel sounds with abdominal distention and tenderness throughout lower quadrants.  Will obtain labs, CT imaging.    Risk  Decision regarding hospitalization.            REASSESSMENT        3:25 PM    CT imaging shows acute appendicitis with microperforation.  Will consult general surgery.      CONSULTS:  None    PROCEDURES:  Unless otherwise noted below, none     Procedures      FINAL IMPRESSION      1. Acute appendicitis with perforation and generalized peritonitis, without abscess or gangrene          DISPOSITION/PLAN   DISPOSITION Decision To Admit 08/04/2024 03:22:30 PM      PATIENT REFERRED TO:  No follow-up provider specified.    DISCHARGE MEDICATIONS:  New Prescriptions    No medications on file         (Please note that portions of this note were completed with a voice recognition program.  Efforts were made to edit the dictations but occasionally words are mis-transcribed.)    Galileo Loredo MD (electronically signed)  Emergency Attending Physician / Physician Assistant / Nurse Practitioner             Galileo Loredo MD  08/04/24 1429

## 2024-08-04 NOTE — PROGRESS NOTES
Clinical Pharmacy Note - Extended Infusion Zosyn Dosing    This patient has been ordered Zosyn at 3375 mg IV every 6 hours. P&T protocol allows automatic substitution to extended-infusion Zosyn and dose adjustment based on indication and renal function.    Indication: IAI    CrCl: 36 mL/min    Per P&T protocol, the Zosyn will initiated with a 4500 mg IV load dose (given over 30 minutes) which will be followed by a maintenance regimen of 3375 mg IV Q 8 hours (each dose will be infused over 4 hours). Pharmacy will continue to monitor this patient daily for changes in clinical status and renal function.    Please call pharmacy with any questions.

## 2024-08-04 NOTE — H&P
Hospitalist Admission Note    NAME: Brent Sabillon Jr.   :  1945   MRN:  [id number]     Date/Time:  2024 4:54 PM    Patient PCP: Yasmine Mascorro MD  ______________________________________________________________________  Given the patient's current clinical presentation, I have a high level of concern for decompensation if discharged from the emergency department.  Given the patient's current clinical presentation, I have a high level of concern for decompensation if patient is discharged from the emergency department.  Patient will be admitted as an inpatient with an estimated LOS of 2 days    My assessment of this patient's clinical condition and my plan of care is as follows.    Assessment / Plan:    Ruptured appendicitis  -NPO.  IVF  -IV zosyn  -General surgery consulted, plan to take patient to the OR tonight.    Pre-op cardiac risk assessment:    Denies CP or SOB.  VS stable and not hypoxic  Recent negative nuclear stress test    RCRI score 2 points for history of CAD and Cr around 2. No history of  CHF, CVA, or preop use of insulin.  Intermediate risk surgery  Class 3 cardiovascular risk with  10%  30 day risk for major cardiac complications    Further risk reduction will involve medical management of other comorbid conditions in the perioperative period.        Mitral regurgitation, s/p ARCHANA 2024  Chronic atrial fibrillation  Essential hypertension  -S/p LHC 3/2024:Mild to moderate coronary artery disease with predominant ectasia in LAD and RCA without high grade obstruction   -Nuclear stress test 3/2024: Normal pharmacological myocardial perfusion study. Findings suggest a low risk of cardiac events   -Echocardiogram 2024: EF 55-60%,    -Hold losartan, doxazosin and amlodipine.  Follow BP post op and plan to restart if BP allows  -Continue carvedilol with hold parameters  -Hold Xarelto, restart when cleared by surgery  -follows with Dr Rico    CKD 3  -Creatinine 1.69, prior  tenderness,  Not distended.  Bowel sounds normal  Extremities: No cyanosis.  Skin turgor normal  Skin:     Not pale.  Not Jaundiced  No rashes   Psych:  Good insight.  Not anxious or agitated.  Neurologic: EOMs intact. No facial asymmetry. No aphasia or slurred speech. Symmetrical strength, Sensation grossly intact. Alert and oriented X 4    LAB DATA REVIEWED:    Recent Results (from the past 12 hour(s))   Lipase    Collection Time: 08/04/24  1:05 PM   Result Value Ref Range    Lipase 14 13 - 75 U/L   Comprehensive Metabolic Panel    Collection Time: 08/04/24  1:05 PM   Result Value Ref Range    Sodium 142 136 - 145 mmol/L    Potassium 3.5 3.5 - 5.1 mmol/L    Chloride 107 97 - 108 mmol/L    CO2 29 21 - 32 mmol/L    Anion Gap 6 5 - 15 mmol/L    Glucose 123 (H) 65 - 100 mg/dL    BUN 21 (H) 6 - 20 MG/DL    Creatinine 1.69 (H) 0.70 - 1.30 MG/DL    BUN/Creatinine Ratio 12 12 - 20      Est, Glom Filt Rate 41 (L) >60 ml/min/1.73m2    Calcium 9.4 8.5 - 10.1 MG/DL    Total Bilirubin 2.1 (H) 0.2 - 1.0 MG/DL    ALT 35 12 - 78 U/L    AST 31 15 - 37 U/L    Alk Phosphatase 63 45 - 117 U/L    Total Protein 7.2 6.4 - 8.2 g/dL    Albumin 3.5 3.5 - 5.0 g/dL    Globulin 3.7 2.0 - 4.0 g/dL    Albumin/Globulin Ratio 0.9 (L) 1.1 - 2.2     CBC with Auto Differential    Collection Time: 08/04/24  1:05 PM   Result Value Ref Range    WBC 11.6 (H) 4.1 - 11.1 K/uL    RBC 5.06 4.10 - 5.70 M/uL    Hemoglobin 14.6 12.1 - 17.0 g/dL    Hematocrit 42.9 36.6 - 50.3 %    MCV 84.8 80.0 - 99.0 FL    MCH 28.9 26.0 - 34.0 PG    MCHC 34.0 30.0 - 36.5 g/dL    RDW 15.9 (H) 11.5 - 14.5 %    Platelets 141 (L) 150 - 400 K/uL    MPV 11.7 8.9 - 12.9 FL    Nucleated RBCs 0.0 0  WBC    nRBC 0.00 0.00 - 0.01 K/uL    Neutrophils % 93 (H) 32 - 75 %    Band Neutrophils 3 0 - 6 %    Lymphocytes % 3 (L) 12 - 49 %    Monocytes % 1 (L) 5 - 13 %    Eosinophils % 0 0 - 7 %    Basophils % 0 0 - 1 %    Immature Granulocytes % 0 %    Neutrophils Absolute 11.2 (H) 1.8 -

## 2024-08-04 NOTE — CONSULTS
General Surgery Consult Note            Date:8/4/2024        Patient Name:Brent Sbaillon Jr.     YOB: 1945     Age:79 y.o.    Reason for Consult:  Perforated appendicitis         History of Present Illness   The patient is an 79 year old male who presents complaining of abdominal pain since this morning.  He has had associated fevers and chills.  He also had some nausea vomiting.  Denies any chest pain or shortness of breath.  States that his pain is in his lower abdomen.  Has a history of A-fib and has been taking Xarelto.  His last Xarelto was yesterday at 430.    Past Medical History     Past Medical History:   Diagnosis Date    Atrial fibrillation (HCC)     CKD (chronic kidney disease)     Heart failure (HCC)     Hypertension         Past Surgical History     Past Surgical History:   Procedure Laterality Date    CARDIAC PROCEDURE N/A 03/25/2024    Left and right heart cath / coronary angiography performed by Radha Allen MD at [facility] CARDIAC CATH LAB    CARDIAC PROCEDURE N/A 03/25/2024    Rodri during cath case performed by Radha Allen MD at [facility] CARDIAC CATH LAB    CARDIAC SURGERY N/A 05/09/2024    TRANSCATHETER EDGE TO EDGE REPAIR performed by Radha Allen MD at [facility] OPEN HEART    CATARACT EXTRACTION Bilateral     ENDOSCOPY, COLON, DIAGNOSTIC      HEMORRHOID SURGERY      MITRAL VALVE REPAIR  05/09/2024    RETINAL DETACHMENT SURGERY Bilateral         Medications     Prior to Admission medications    Medication Sig Start Date End Date Taking? Authorizing Provider   amLODIPine (NORVASC) 5 MG tablet Take 1 tablet by mouth daily 6/10/24   Provider, MD Citlaly   furosemide (LASIX) 40 MG tablet Take 1 tablet by mouth daily 6/20/24 12/17/24  José Antonio Rico MD   losartan (COZAAR) 100 MG tablet Take 1 tablet by mouth daily 6/19/24   José Antonio Rico MD   rivaroxaban (XARELTO) 15 MG TABS tablet Take 1 tablet by mouth Daily with supper 5/12/24   Destiny Wolfe APRN - NP   acetaminophen (TYLENOL) 325

## 2024-08-05 ENCOUNTER — APPOINTMENT (OUTPATIENT)
Facility: HOSPITAL | Age: 79
End: 2024-08-05
Payer: MEDICARE

## 2024-08-05 LAB
ALBUMIN SERPL-MCNC: 2.8 G/DL (ref 3.5–5)
ALBUMIN/GLOB SERPL: 1 (ref 1.1–2.2)
ALP SERPL-CCNC: 45 U/L (ref 45–117)
ALT SERPL-CCNC: 26 U/L (ref 12–78)
ANION GAP SERPL CALC-SCNC: 6 MMOL/L (ref 5–15)
AST SERPL-CCNC: 22 U/L (ref 15–37)
BASOPHILS # BLD: 0 K/UL (ref 0–0.1)
BASOPHILS NFR BLD: 0 % (ref 0–1)
BILIRUB SERPL-MCNC: 1.3 MG/DL (ref 0.2–1)
BUN SERPL-MCNC: 24 MG/DL (ref 6–20)
BUN/CREAT SERPL: 14 (ref 12–20)
CALCIUM SERPL-MCNC: 8 MG/DL (ref 8.5–10.1)
CHLORIDE SERPL-SCNC: 112 MMOL/L (ref 97–108)
CO2 SERPL-SCNC: 25 MMOL/L (ref 21–32)
CREAT SERPL-MCNC: 1.66 MG/DL (ref 0.7–1.3)
DIFFERENTIAL METHOD BLD: ABNORMAL
EOSINOPHIL # BLD: 0 K/UL (ref 0–0.4)
EOSINOPHIL NFR BLD: 0 % (ref 0–7)
ERYTHROCYTE [DISTWIDTH] IN BLOOD BY AUTOMATED COUNT: 15.9 % (ref 11.5–14.5)
GLOBULIN SER CALC-MCNC: 2.8 G/DL (ref 2–4)
GLUCOSE SERPL-MCNC: 146 MG/DL (ref 65–100)
HCT VFR BLD AUTO: 36.5 % (ref 36.6–50.3)
HGB BLD-MCNC: 12.2 G/DL (ref 12.1–17)
IMM GRANULOCYTES # BLD AUTO: 0 K/UL
IMM GRANULOCYTES NFR BLD AUTO: 0 %
LYMPHOCYTES # BLD: 0.4 K/UL (ref 0.8–3.5)
LYMPHOCYTES NFR BLD: 3 % (ref 12–49)
MCH RBC QN AUTO: 28.2 PG (ref 26–34)
MCHC RBC AUTO-ENTMCNC: 33.4 G/DL (ref 30–36.5)
MCV RBC AUTO: 84.3 FL (ref 80–99)
MONOCYTES # BLD: 0.5 K/UL (ref 0–1)
MONOCYTES NFR BLD: 4 % (ref 5–13)
NEUTS BAND NFR BLD MANUAL: 1 % (ref 0–6)
NEUTS SEG # BLD: 11.9 K/UL (ref 1.8–8)
NEUTS SEG NFR BLD: 92 % (ref 32–75)
NRBC # BLD: 0 K/UL (ref 0–0.01)
NRBC BLD-RTO: 0 PER 100 WBC
NT PRO BNP: 5842 PG/ML
PLATELET # BLD AUTO: 121 K/UL (ref 150–400)
PMV BLD AUTO: 12 FL (ref 8.9–12.9)
POTASSIUM SERPL-SCNC: 3.6 MMOL/L (ref 3.5–5.1)
PROT SERPL-MCNC: 5.6 G/DL (ref 6.4–8.2)
RBC # BLD AUTO: 4.33 M/UL (ref 4.1–5.7)
RBC MORPH BLD: ABNORMAL
SODIUM SERPL-SCNC: 143 MMOL/L (ref 136–145)
WBC # BLD AUTO: 12.8 K/UL (ref 4.1–11.1)

## 2024-08-05 PROCEDURE — 6360000002 HC RX W HCPCS

## 2024-08-05 PROCEDURE — 2580000003 HC RX 258: Performed by: SURGERY

## 2024-08-05 PROCEDURE — 36415 COLL VENOUS BLD VENIPUNCTURE: CPT

## 2024-08-05 PROCEDURE — 99222 1ST HOSP IP/OBS MODERATE 55: CPT | Performed by: INTERNAL MEDICINE

## 2024-08-05 PROCEDURE — 6360000002 HC RX W HCPCS: Performed by: INTERNAL MEDICINE

## 2024-08-05 PROCEDURE — 71045 X-RAY EXAM CHEST 1 VIEW: CPT

## 2024-08-05 PROCEDURE — 2580000003 HC RX 258

## 2024-08-05 PROCEDURE — 6360000002 HC RX W HCPCS: Performed by: SURGERY

## 2024-08-05 PROCEDURE — 2580000003 HC RX 258: Performed by: INTERNAL MEDICINE

## 2024-08-05 PROCEDURE — 6370000000 HC RX 637 (ALT 250 FOR IP): Performed by: INTERNAL MEDICINE

## 2024-08-05 PROCEDURE — 1100000000 HC RM PRIVATE

## 2024-08-05 PROCEDURE — 80053 COMPREHEN METABOLIC PANEL: CPT

## 2024-08-05 PROCEDURE — 85025 COMPLETE CBC W/AUTO DIFF WBC: CPT

## 2024-08-05 PROCEDURE — 6370000000 HC RX 637 (ALT 250 FOR IP)

## 2024-08-05 PROCEDURE — 83880 ASSAY OF NATRIURETIC PEPTIDE: CPT

## 2024-08-05 RX ORDER — METRONIDAZOLE 250 MG/1
500 TABLET ORAL EVERY 8 HOURS SCHEDULED
Status: DISCONTINUED | OUTPATIENT
Start: 2024-08-05 | End: 2024-08-07 | Stop reason: HOSPADM

## 2024-08-05 RX ORDER — FUROSEMIDE 10 MG/ML
40 INJECTION INTRAMUSCULAR; INTRAVENOUS DAILY
Status: DISCONTINUED | OUTPATIENT
Start: 2024-08-05 | End: 2024-08-07 | Stop reason: HOSPADM

## 2024-08-05 RX ADMIN — LATANOPROST 1 DROP: 50 SOLUTION OPHTHALMIC at 02:15

## 2024-08-05 RX ADMIN — TIMOLOL MALEATE 1 DROP: 5 SOLUTION OPHTHALMIC at 12:42

## 2024-08-05 RX ADMIN — DORZOLAMIDE HYDROCHLORIDE 1 DROP: 20 SOLUTION/ DROPS OPHTHALMIC at 15:15

## 2024-08-05 RX ADMIN — CARVEDILOL 25 MG: 12.5 TABLET, FILM COATED ORAL at 09:41

## 2024-08-05 RX ADMIN — BRIMONIDINE TARTRATE 1 DROP: 2 SOLUTION OPHTHALMIC at 12:44

## 2024-08-05 RX ADMIN — ROSUVASTATIN 40 MG: 40 TABLET, FILM COATED ORAL at 21:02

## 2024-08-05 RX ADMIN — WATER 2000 MG: 1 INJECTION INTRAMUSCULAR; INTRAVENOUS; SUBCUTANEOUS at 14:00

## 2024-08-05 RX ADMIN — DORZOLAMIDE HYDROCHLORIDE 1 DROP: 20 SOLUTION/ DROPS OPHTHALMIC at 21:02

## 2024-08-05 RX ADMIN — FINASTERIDE 5 MG: 5 TABLET, FILM COATED ORAL at 09:41

## 2024-08-05 RX ADMIN — METRONIDAZOLE 500 MG: 250 TABLET ORAL at 22:43

## 2024-08-05 RX ADMIN — PANTOPRAZOLE SODIUM 40 MG: 40 INJECTION, POWDER, FOR SOLUTION INTRAVENOUS at 09:43

## 2024-08-05 RX ADMIN — METRONIDAZOLE 500 MG: 250 TABLET ORAL at 15:11

## 2024-08-05 RX ADMIN — DORZOLAMIDE HYDROCHLORIDE 1 DROP: 20 SOLUTION/ DROPS OPHTHALMIC at 12:39

## 2024-08-05 RX ADMIN — PIPERACILLIN AND TAZOBACTAM 3375 MG: 3; .375 INJECTION, POWDER, FOR SOLUTION INTRAVENOUS at 08:04

## 2024-08-05 RX ADMIN — LATANOPROST 1 DROP: 50 SOLUTION OPHTHALMIC at 21:02

## 2024-08-05 RX ADMIN — FUROSEMIDE 40 MG: 10 INJECTION, SOLUTION INTRAMUSCULAR; INTRAVENOUS at 15:10

## 2024-08-05 RX ADMIN — SODIUM CHLORIDE, PRESERVATIVE FREE 10 ML: 5 INJECTION INTRAVENOUS at 12:39

## 2024-08-05 RX ADMIN — CARVEDILOL 25 MG: 12.5 TABLET, FILM COATED ORAL at 21:02

## 2024-08-05 NOTE — CARE COORDINATION
Care Management Initial Assessment       RUR: 19% Medium   Readmission? No  1st IM letter given? Yes - 8/5/24  1st  letter given: NA    CM met with patient at bedside to introduce self and explain role. Patient lives with his wife in a 1 story home with 3 steps to enter. Patient was independent at baseline with ADL's, IADL's and ambulation. Patient does not own any DME. Patient reports no history of HH or SNF/IPR. Patient's wife will provide transport home once medically stable. CM will follow as needed.      08/05/24 5425   Service Assessment   Patient Orientation Alert and Oriented;Person;Place;Situation;Self   Cognition Alert   History Provided By Patient   Primary Caregiver Self   Accompanied By/Relationship Wife   Support Systems Spouse/Significant Other;Children   Patient's Healthcare Decision Maker is: Legal Next of Kin   PCP Verified by CM Yes   Last Visit to PCP Within last two years   Prior Functional Level Independent in ADLs/IADLs   Current Functional Level Independent in ADLs/IADLs   Can patient return to prior living arrangement Yes   Ability to make needs known: Good   Family able to assist with home care needs: Yes   Would you like for me to discuss the discharge plan with any other family members/significant others, and if so, who? Yes  (Upon patient request)   Financial Resources Medicare   Social/Functional History   Lives With Spouse   Type of Home House   Home Layout One level   Bathroom Equipment None   Home Equipment None   ADL Assistance Independent   Homemaking Assistance Independent   Ambulation Assistance Independent   Transfer Assistance Independent   Discharge Planning   Type of Residence House   Living Arrangements Spouse/Significant Other   Current Services Prior To Admission None   Potential Assistance Needed N/A   DME Ordered? No   Potential Assistance Purchasing Medications No   Type of Home Care Services None   Patient expects to be discharged to: [facility]

## 2024-08-05 NOTE — DISCHARGE INSTRUCTIONS
Discharge Instructions       PATIENT ID: Brent Sabillon Jr.  MRN: [id number]   YOB: 1945    DATE OF ADMISSION: 8/4/2024   DATE OF DISCHARGE: 8/7/2024    PRIMARY CARE PROVIDER: Yasmine Mascorro     ATTENDING PHYSICIAN: Pio Sparrow MD   DISCHARGING PROVIDER: Maryse Pino PA-C    To contact this individual call [phone] and ask the  to page.   If unavailable ask to be transferred the Adult Hospitalist Department.    DISCHARGE DIAGNOSES     Appendicitis with microperforation   S/p Appendectomy 8/4  -IVF  -Received 1 day of IV zosyn -- start Rocephin and Flagyl today per ID -- dc on cefdinir and flagyl   -General surgery consulted, cleared for dc today  - Laparoscopic appendectomy 8/4  - ID following, cleared for dc today      Acute hypoxic respiratory failure - resolved   - currently on RA  - echo 6/2024: EF 55-60%, diastolic dysfunction   - CT A/P 8/4 before surgery with small BL pleural effusions  - Suspect this is related to fluid overload during surgery  - repeat CXR 8/5 with evidence of atelectasis vs airspace disease   - BNP 5842  - IV Lasix 40mg daily -- will dc on few days PO lasix       Mitral regurgitation, s/p ARCHANA 5/9/2024  Chronic atrial fibrillation  Essential hypertension  -S/p LHC 3/2024:Mild to moderate coronary artery disease with predominant ectasia in LAD and RCA without high grade obstruction   -Nuclear stress test 3/2024: Normal pharmacological myocardial perfusion study. Findings suggest a low risk of cardiac events   -Echocardiogram 6/2024: EF 55-60%,  -Hold losartan, doxazosin and amlodipine.  Follow BP post op and plan to restart if BP allows  -Continue carvedilol with hold parameters  -Hold Xarelto, restart when cleared by surgery -- restart 8/7  -follows with Dr Rico     CKD 3  -Creatinine 2.05, prior baseline between 2-2.4  -Follow renal function  - needs outpatient follow up with nephrologist      BPH  -continue proscar     Glaucoma  -Continue

## 2024-08-05 NOTE — ANESTHESIA PRE PROCEDURE
Department of Anesthesiology  Preprocedure Note       Name:  Brent Sabillon Jr.   Age:  79 y.o.  :  1945                                          MRN:  [id number]         Date:  2024      Surgeon: Surgeon(s):  Casimiro Alvarez MD    Procedure: Procedure(s):  APPENDECTOMY LAPAROSCOPIC REQ 2100    Medications prior to admission:   Prior to Admission medications    Medication Sig Start Date End Date Taking? Authorizing Provider   amLODIPine (NORVASC) 5 MG tablet Take 1 tablet by mouth daily 6/10/24   Citlaly Dillon MD   furosemide (LASIX) 40 MG tablet Take 1 tablet by mouth daily 24  José Antonio Rico MD   losartan (COZAAR) 100 MG tablet Take 1 tablet by mouth daily 24   José Antonio Rico MD   rivaroxaban (XARELTO) 15 MG TABS tablet Take 1 tablet by mouth Daily with supper 24   Destiny Wolfe APRN - NP   acetaminophen (TYLENOL) 325 MG tablet Take 2 tablets by mouth every 4 hours as needed for Pain  Patient not taking: Reported on 5/15/2024 5/10/24   Destiny Wolfe APRN - NP   KLOR-CON M20 20 MEQ extended release tablet Take 1 tab (20 meq) Monday, Wednesday, Friday. Take 2 tabs (40 meq) all other days.  Patient taking differently: Take 1 tablet by mouth daily 24   José Atnonio Rico MD   doxazosin (CARDURA) 4 MG tablet Take 1 tablet by mouth daily 24   Destiny Wolfe APRN - NP   carvedilol (COREG) 25 MG tablet Take 1 tablet by mouth 2 times daily 3/8/24   ProviderCitlaly MD   bimatoprost (LUMIGAN) 0.01 % SOLN ophthalmic drops Place 1 drop into the right eye nightly    Automatic Reconciliation, Ar   brimonidine (ALPHAGAN P) 0.1 % SOLN Place 1 drop into the right eye daily    Automatic Reconciliation, Ar   dorzolamide (TRUSOPT) 2 % ophthalmic solution Place 1 drop into the right eye 3 times daily    Automatic Reconciliation, Ar   finasteride (PROSCAR) 5 MG tablet Take 1 tablet by mouth daily ceived the following from [facility]: Outside name:

## 2024-08-05 NOTE — CONSULTS
COLON, DIAGNOSTIC      HEMORRHOID SURGERY      LAPAROSCOPIC APPENDECTOMY N/A 8/4/2024    APPENDECTOMY LAPAROSCOPIC performed by Casimiro Alvarez MD at [facility] MAIN OR    MITRAL VALVE REPAIR  05/09/2024    RETINAL DETACHMENT SURGERY Bilateral       Prior to Admission medications    Medication Sig Start Date End Date Taking? Authorizing Provider   oxyCODONE-acetaminophen (PERCOCET) 5-325 MG per tablet Take 1-2 tablets by mouth every 6 hours as needed for Pain for up to 4 days. Intended supply: 3 days. Take lowest dose possible to manage pain Max Daily Amount: 8 tablets 8/4/24 8/8/24 Yes Casimiro Alvarez MD   amLODIPine (NORVASC) 5 MG tablet Take 1 tablet by mouth daily 6/10/24   ProviderCitlaly MD   furosemide (LASIX) 40 MG tablet Take 1 tablet by mouth daily 6/20/24 12/17/24  José Antonio Rico MD   losartan (COZAAR) 100 MG tablet Take 1 tablet by mouth daily 6/19/24   José Antonio Rico MD   rivaroxaban (XARELTO) 15 MG TABS tablet Take 1 tablet by mouth Daily with supper 5/12/24   Destiny Wolfe APRN - NP   acetaminophen (TYLENOL) 325 MG tablet Take 2 tablets by mouth every 4 hours as needed for Pain  Patient not taking: Reported on 5/15/2024 5/10/24   Destiny Wolfe APRN - NP   KLOR-CON M20 20 MEQ extended release tablet Take 1 tab (20 meq) Monday, Wednesday, Friday. Take 2 tabs (40 meq) all other days.  Patient taking differently: Take 1 tablet by mouth daily 4/29/24   José Antonio Rico MD   doxazosin (CARDURA) 4 MG tablet Take 1 tablet by mouth daily 4/4/24   Destiny Wolfe APRN - NP   carvedilol (COREG) 25 MG tablet Take 1 tablet by mouth 2 times daily 3/8/24   ProviderCitlaly MD   bimatoprost (LUMIGAN) 0.01 % SOLN ophthalmic drops Place 1 drop into the right eye nightly    Automatic Reconciliation, Ar   brimonidine (ALPHAGAN P) 0.1 % SOLN Place 1 drop into the right eye daily    Automatic Reconciliation, Ar   dorzolamide (TRUSOPT) 2 % ophthalmic solution Place 1 drop into the right eye 3 times daily        Basophils Absolute 0.0 0.0 - 0.1 K/UL    Immature Granulocytes Absolute 0.0 K/UL    Differential Type MANUAL      RBC Comment MARY CELLS  PRESENT            Microbiology:      Studies:  CT Result (most recent):  CT ABDOMEN PELVIS W IV CONTRAST 08/04/2024    Narrative  INDICATION: bilateral lower abdominal pain    COMPARISON: 1/7/2019    TECHNIQUE:  Thin axial images were obtained through the abdomen and pelvis with intravenous  iodinated contrast administration. Coronal and sagittal reconstructions were  generated. Oral contrast was not administered. CT dose reduction was achieved  through use of a standardized protocol tailored for this examination and  automatic exposure control for dose modulation.    FINDINGS:    LIVER: A few scattered subcentimeter low-density lesions. No significant biliary  ductal dilatation.  GALLBLADDER: Tiny layering calcified stones versus sludge but otherwise  unremarkable  SPLEEN: Unremarkable  PANCREAS: No mass or ductal dilatation.  ADRENALS: Mild thickening of the adrenal glands  KIDNEYS/URETERS: Bilateral renal cortical atrophy and scarring. No evidence for  abnormal enhancing mass or hydronephrosis.  PERITONEUM: Trace ascites. No abdominal lymphadenopathy.  COLON: No dilatation or wall thickening.  APPENDIX: Enlarged and inflamed appendix compatible with a appendicitis (3-51).  Small amount of free intraperitoneal air compatible with microperforation. No  abscess. SMALL BOWEL: No dilatation or wall thickening.  STOMACH: Gastritis  PELVIS: Markedly enlarged prostate gland. Moderate cystitis. No pelvic  lymphadenopathy or free fluid. Bilateral inguinal hernias and hydroceles.  BONES: Mild degenerative changes in the spine.  VISUALIZED THORAX: Moderate cardiomegaly. Bibasilar opacities favor atelectasis.  Small pleural effusions.  ADDITIONAL COMMENTS: N/A    Impression  1. Acute appendicitis with microperforation and minimal free intraperitoneal  air. No abscess.  2. Moderately

## 2024-08-05 NOTE — ED NOTES
Pt's clothing removed & placed into belongings bags. Pt cleansed as he had an incontinent episode of the bowel & briefs were soaked with urine. New gown placed on pt.

## 2024-08-05 NOTE — PERIOP NOTE
TRANSFER - OUT REPORT:    Verbal report given to Marguerite GAGNON(name) on Brent Sabillon Jr.  being transferred to [facility](unit) for routine post-op       Report consisted of patient’s Situation, Background, Assessment and   Recommendations(SBAR).     Time Pre op antibiotic given:ancef 2gm IV @2134  Anesthesia Stop time: 2302    Information from the following report(s) SBAR, OR Summary, Intake/Output, and MAR was reviewed with the receiving nurse.    Opportunity for questions and clarification was provided.     Is the patient on 02? Yes       L/Min 2       Other n/a    Is the patient on a monitor? No    Is the nurse transporting with the patient? Yes    Surgical Waiting Area notified of patient's transfer from PACU? Yes    Pt's upper dentures placed back in mouth per pt reques

## 2024-08-05 NOTE — ED NOTES
Assumed care of pt @ this time. Introduced self to pt & wife @ bedside. Pt in NAD with even & unlabored respirations. Connected pt to monitoring equipment & VS set to cycle q 30 min. VSS. Stretcher in lowest position, brakes secured, & B/L rails raised. Call bell within pt's reach. No requests @ this time.

## 2024-08-05 NOTE — ED NOTES
Pt notified that the OR is ready & OR transport has arrived to take him. Pt in NAD with even & unlabored respirations. VSS. No requests @ this time. Pt transferred out of ED by transport.

## 2024-08-05 NOTE — PROGRESS NOTES
General Surgery Progress Note    1 Day Post-Op   APPENDECTOMY LAPAROSCOPIC (Abdomen)   Date:2024       Room:[facility]  Patient Name:Brent Sabillon Jr.     YOB: 1945     Age:79 y.o.    Subjective     No acute surgical issues.  Pt reported incisional pain at umbilicus.  Tolerating diet.       Medications   Scheduled Meds:    sodium chloride flush  5-40 mL IntraVENous 2 times per day    pantoprazole (PROTONIX) 40 mg in sodium chloride (PF) 0.9 % 10 mL injection  40 mg IntraVENous Daily    latanoprost  1 drop Right Eye Nightly    brimonidine  1 drop Right Eye Daily    dorzolamide  1 drop Right Eye TID    timolol  1 drop Right Eye Daily    rosuvastatin  40 mg Oral Nightly    finasteride  5 mg Oral Daily    carvedilol  25 mg Oral BID    piperacillin-tazobactam  3,375 mg IntraVENous Q8H     Continuous Infusions:    sodium chloride      sodium chloride 100 mL/hr at 24 1903     PRN Meds: sodium chloride flush, sodium chloride, ondansetron **OR** ondansetron, polyethylene glycol, acetaminophen **OR** acetaminophen, HYDROmorphone, HYDROmorphone, oxyCODONE, oxyCODONE        Physical Examination      Vitals:  /86   Pulse 86   Temp 98.4 °F (36.9 °C) (Oral)   Resp 28   Ht 1.778 m (5' 10\")   Wt 85.7 kg (188 lb 15 oz)   SpO2 95%   BMI 27.11 kg/m²   Temp (24hrs), Av.3 °F (36.8 °C), Min:97.5 °F (36.4 °C), Max:98.9 °F (37.2 °C)      Physical Exam:    Gen:  No apparent distress  Neuro:  Alert and oriented x4  Pulm:  Unlabored  Abd:  Soft/non-distended/appropriate tenderness to palpation without guarding or rebound  Ext:  No cyanosis, clubbing or edema  Wound:  C/D/I    I/O (24Hr):    Intake/Output Summary (Last 24 hours) at 2024 1044  Last data filed at 2024 0733  Gross per 24 hour   Intake 700 ml   Output 775 ml   Net -75 ml         Labs/Imaging/Diagnostics   Labs:  CBC:  Recent Labs     24  1305 24  0515   WBC 11.6* 12.8*   RBC 5.06 4.33   HGB 14.6 12.2   HCT 42.9 36.5*

## 2024-08-05 NOTE — ANESTHESIA POSTPROCEDURE EVALUATION
Department of Anesthesiology  Postprocedure Note    Patient: Brent Sabillon Jr.  MRN: [id number]  YOB: 1945  Date of evaluation: 8/4/2024    Procedure Summary       Date: 08/04/24 Room / Location: [facility] MAIN OR [address] [place] [address] MAIN OR    Anesthesia Start: 2112 Anesthesia Stop: 2302    Procedure: APPENDECTOMY LAPAROSCOPIC (Abdomen) Diagnosis:       Acute appendicitis without peritonitis      (Acute appendicitis without peritonitis [K35.80])    Providers: Casimiro Alvarez MD Responsible Provider: Allen Turpin DO    Anesthesia Type: general ASA Status: 3 - Emergent            Anesthesia Type: No value filed.    Ld Phase I: Ld Score: 8    Ld Phase II:      Anesthesia Post Evaluation    Patient location during evaluation: bedside  Patient participation: complete - patient participated  Level of consciousness: awake  Pain score: 0  Airway patency: patent  Nausea & Vomiting: no nausea and no vomiting  Cardiovascular status: blood pressure returned to baseline  Respiratory status: acceptable  Hydration status: euvolemic  Comments: /77   Pulse 80   Temp 97.6 °F (36.4 °C) (Axillary)   Resp 26   Ht 1.778 m (5' 10\")   Wt 85.7 kg (188 lb 15 oz)   SpO2 93%   BMI 27.11 kg/m²     Pain management: adequate    No notable events documented.

## 2024-08-05 NOTE — OP NOTE
Operative Note      Patient: Brent Sabillon Jr.  YOB: 1945  MRN: [id number]    Date of Procedure: 8/4/2024    Pre-Op Diagnosis Codes:     * Acute appendicitis without peritonitis [K35.80]    Post-Op Diagnosis: Same       Procedure(s):  APPENDECTOMY LAPAROSCOPIC    Surgeon(s):  Casimiro Alvarez MD    Assistant:   Surgical Assistant: Michaelle Sosa    Anesthesia: General    Estimated Blood Loss (mL): less than 50     Complications: None    Specimens:   ID Type Source Tests Collected by Time Destination   1 : appendix Tissue Appendix SURGICAL PATHOLOGY Casimiro Alvarez MD 8/4/2024 2131        Implants:  * No implants in log *      Drains:   Urinary Catheter 08/04/24 Mixon (Active)   Catheter Indications Perioperative use for selected surgical procedures 08/04/24 2253   Site Assessment Pink;Bleeding 08/04/24 2253   Urine Color Yellow 08/04/24 2253   Urine Appearance Clear 08/04/24 2253   Collection Container Standard 08/04/24 2253   Securement Method Securing device (Describe) 08/04/24 2253   Catheter Best Practices  Catheter secured to thigh;Tamper seal intact;Bag below bladder;Bag not on floor;Lack of dependent loop in tubing;Drainage bag less than half full;Drainage tube clipped to bed 08/04/24 2253   Status Draining 08/04/24 2253       [REMOVED] Urinary Catheter 05/09/24 Mixon (Removed)       [REMOVED] Urinary Catheter 05/09/24 [address] Way (Removed)       [REMOVED] External Urinary Catheter (Removed)   Site Assessment Clean,dry & intact 08/04/24 2015   Placement Initiated 08/04/24 2015   Catheter Care Catheter/Wick replaced;Suction Canister/Tubing changed 08/04/24 2015   Perineal Care Yes 08/04/24 2015   Suction 40 mmgHg continuous 08/04/24 2015       Findings:  Infection Present At Time Of Surgery (PATOS) (choose all levels that have infection present):  - Organ Space infection (below fascia) present as evidenced by fluid consistent with infection  Other Findings: acute appendicitis with brownish  fluid no clear perforation     Detailed Description of Procedure:   Patient was brought to the operating room placed in the operative table supine position.  General endotracheal anesthesia was then established.  She was then prepped and draped in usual sterile fashion.  5 mm infraumbilical incision was then made.  Veress needle was placed within the abdomen and saline drop test was performed.  Once were assured we are within the abdomen, the abdomen was insufflated to 15 mmHg. Veress needle was removed and a 5 mm optiview trocar was then placed.  An additional 5 mm and 1-12 mm trocar were then placed through direct visualization.  In the pelvis there seemed to be some brownish fluid. This was aspirated.   The appendix was identified . It was down towards the pelvis. It was elevated and   The appendix was then grabbed and the mesentery of the appendix was resected using the sonocision down to the base of the appendix.  The terminal ileum was up along the appendix as well.  This was taken down.  Once at the base of the appendix an Endo NEAL 45 white load stapler was introduced and fired.  The appendix was thus amputated.   It was then placed into an Endobag and removed via the 12 mm trocar site.  There was some additional almost necrotic type material down there as well which was taken out and sent off to pathology as well.   There was some bleeding along the mesentery likely from the patient's Xarelto that he was taking.  Bleeding was controlled with clip applier and sun incision.  The abdomen was irrigated no further bleeding was identified.  This 12millimeter trocar was removed and the fascia was closed using 0 Vicryl suture and the Endo fascial closure device.  The abdomen was desufflated other trochars were removed local anesthetic was injected. The skin was then closed using 4-0 Monocryl subcuticular fashion.  Mastisol Steri-Strips 2 x 2's Tegaderms were applied.  Patient was woken the OR and taken to PACU in

## 2024-08-05 NOTE — PROGRESS NOTES
[facility]                                                                                          Hospitalist Progress Note  Maryse Pino PA-C  Answering service: [phone] OR [phone] from in house phone        Date of Service:  2024  NAME:  Brent Sabillon Jr.  :  1945  MRN:  [id number]       Admission Summary:     Brent Sabillon is a 79 y.o. male with history of mitral regurgitation, s/p MARTIN ER last May 2024 atrial fibrillation, hypertension, CKD and BPH who presents with acute onset of abdominal pain.  Patient went to bed last night feeling well but woke up this morning with lower abdominal pain.  He initially felt it did not that he might be constipated and took 2 Dulcolax tablets and subsequently had a small bowel movement.  He then vomited clear liquid and continues to experience pain and therefore decided to seek consultation in the ED.  CT scan of the abdomen demonstrates acute appendicitis with microperforation and minimal free intraperitoneal air but no abscess.  General surgery was consulted and HM service asked to admit          Interval history / Subjective:     Patient seen and examined sitting up in bed eating lunch. Wife at bedside. Endorses continued achy sensation over lower abdomen since surgery. No other complaints at this time. Denies cp, sob, lightheadedness, palpitations. Will restart Xarelto tomorrow.   Denies SOB but does appear sob on exam, speaking in short sentences, currently 99% on 2L, lungs sound ok     Assessment & Plan:     Ruptured appendicitis  S/p Appendectomy   -IVF  -Received 1 day of IV zosyn -- start Rocephin and Flagyl today per ID   -General surgery consulted  - Laparoscopic appendectomy     Acute hypoxic respiratory failure   - currently 98% on 2L NC  - echo 2024: EF 55-60%, diastolic dysfunction   - CT A/P  before surgery with small BL pleural effusions  - Suspect this is related to fluid

## 2024-08-06 LAB
ANION GAP SERPL CALC-SCNC: 7 MMOL/L (ref 5–15)
BUN SERPL-MCNC: 33 MG/DL (ref 6–20)
BUN/CREAT SERPL: 16 (ref 12–20)
CALCIUM SERPL-MCNC: 8.2 MG/DL (ref 8.5–10.1)
CHLORIDE SERPL-SCNC: 112 MMOL/L (ref 97–108)
CO2 SERPL-SCNC: 24 MMOL/L (ref 21–32)
CREAT SERPL-MCNC: 2.05 MG/DL (ref 0.7–1.3)
ERYTHROCYTE [DISTWIDTH] IN BLOOD BY AUTOMATED COUNT: 16.1 % (ref 11.5–14.5)
GLUCOSE SERPL-MCNC: 109 MG/DL (ref 65–100)
HCT VFR BLD AUTO: 35.4 % (ref 36.6–50.3)
HGB BLD-MCNC: 11.8 G/DL (ref 12.1–17)
MCH RBC QN AUTO: 28.5 PG (ref 26–34)
MCHC RBC AUTO-ENTMCNC: 33.3 G/DL (ref 30–36.5)
MCV RBC AUTO: 85.5 FL (ref 80–99)
NRBC # BLD: 0 K/UL (ref 0–0.01)
NRBC BLD-RTO: 0 PER 100 WBC
PLATELET # BLD AUTO: 114 K/UL (ref 150–400)
PMV BLD AUTO: 12.6 FL (ref 8.9–12.9)
POTASSIUM SERPL-SCNC: 3.5 MMOL/L (ref 3.5–5.1)
RBC # BLD AUTO: 4.14 M/UL (ref 4.1–5.7)
SODIUM SERPL-SCNC: 143 MMOL/L (ref 136–145)
WBC # BLD AUTO: 10.4 K/UL (ref 4.1–11.1)

## 2024-08-06 PROCEDURE — 6370000000 HC RX 637 (ALT 250 FOR IP)

## 2024-08-06 PROCEDURE — 6370000000 HC RX 637 (ALT 250 FOR IP): Performed by: INTERNAL MEDICINE

## 2024-08-06 PROCEDURE — 6360000002 HC RX W HCPCS: Performed by: INTERNAL MEDICINE

## 2024-08-06 PROCEDURE — 2580000003 HC RX 258: Performed by: INTERNAL MEDICINE

## 2024-08-06 PROCEDURE — 1100000000 HC RM PRIVATE

## 2024-08-06 PROCEDURE — 2580000003 HC RX 258

## 2024-08-06 PROCEDURE — 85027 COMPLETE CBC AUTOMATED: CPT

## 2024-08-06 PROCEDURE — 36415 COLL VENOUS BLD VENIPUNCTURE: CPT

## 2024-08-06 PROCEDURE — 6370000000 HC RX 637 (ALT 250 FOR IP): Performed by: SURGERY

## 2024-08-06 PROCEDURE — 6360000002 HC RX W HCPCS

## 2024-08-06 PROCEDURE — 80048 BASIC METABOLIC PNL TOTAL CA: CPT

## 2024-08-06 PROCEDURE — 99232 SBSQ HOSP IP/OBS MODERATE 35: CPT | Performed by: INTERNAL MEDICINE

## 2024-08-06 RX ORDER — PANTOPRAZOLE SODIUM 40 MG/1
40 TABLET, DELAYED RELEASE ORAL
Status: DISCONTINUED | OUTPATIENT
Start: 2024-08-07 | End: 2024-08-07 | Stop reason: HOSPADM

## 2024-08-06 RX ADMIN — CARVEDILOL 25 MG: 12.5 TABLET, FILM COATED ORAL at 08:54

## 2024-08-06 RX ADMIN — BRIMONIDINE TARTRATE 1 DROP: 2 SOLUTION OPHTHALMIC at 09:00

## 2024-08-06 RX ADMIN — PANTOPRAZOLE SODIUM 40 MG: 40 INJECTION, POWDER, FOR SOLUTION INTRAVENOUS at 08:55

## 2024-08-06 RX ADMIN — SODIUM CHLORIDE, PRESERVATIVE FREE 10 ML: 5 INJECTION INTRAVENOUS at 21:31

## 2024-08-06 RX ADMIN — FINASTERIDE 5 MG: 5 TABLET, FILM COATED ORAL at 08:55

## 2024-08-06 RX ADMIN — LATANOPROST 1 DROP: 50 SOLUTION OPHTHALMIC at 21:31

## 2024-08-06 RX ADMIN — METRONIDAZOLE 500 MG: 250 TABLET ORAL at 21:30

## 2024-08-06 RX ADMIN — DORZOLAMIDE HYDROCHLORIDE 1 DROP: 20 SOLUTION/ DROPS OPHTHALMIC at 21:31

## 2024-08-06 RX ADMIN — OXYCODONE HYDROCHLORIDE 5 MG: 5 TABLET ORAL at 08:54

## 2024-08-06 RX ADMIN — CARVEDILOL 25 MG: 12.5 TABLET, FILM COATED ORAL at 21:30

## 2024-08-06 RX ADMIN — TIMOLOL MALEATE 1 DROP: 5 SOLUTION OPHTHALMIC at 09:00

## 2024-08-06 RX ADMIN — DORZOLAMIDE HYDROCHLORIDE 1 DROP: 20 SOLUTION/ DROPS OPHTHALMIC at 09:00

## 2024-08-06 RX ADMIN — ROSUVASTATIN 40 MG: 40 TABLET, FILM COATED ORAL at 21:30

## 2024-08-06 RX ADMIN — METRONIDAZOLE 500 MG: 250 TABLET ORAL at 05:42

## 2024-08-06 RX ADMIN — DORZOLAMIDE HYDROCHLORIDE 1 DROP: 20 SOLUTION/ DROPS OPHTHALMIC at 13:54

## 2024-08-06 RX ADMIN — WATER 2000 MG: 1 INJECTION INTRAMUSCULAR; INTRAVENOUS; SUBCUTANEOUS at 12:06

## 2024-08-06 RX ADMIN — METRONIDAZOLE 500 MG: 250 TABLET ORAL at 13:54

## 2024-08-06 RX ADMIN — FUROSEMIDE 40 MG: 10 INJECTION, SOLUTION INTRAMUSCULAR; INTRAVENOUS at 08:55

## 2024-08-06 RX ADMIN — SODIUM CHLORIDE, PRESERVATIVE FREE 10 ML: 5 INJECTION INTRAVENOUS at 09:00

## 2024-08-06 ASSESSMENT — PAIN DESCRIPTION - ORIENTATION: ORIENTATION: ANTERIOR

## 2024-08-06 ASSESSMENT — PAIN DESCRIPTION - DESCRIPTORS
DESCRIPTORS: ACHING
DESCRIPTORS: OTHER (COMMENT)

## 2024-08-06 ASSESSMENT — PAIN SCALES - GENERAL
PAINLEVEL_OUTOF10: 5
PAINLEVEL_OUTOF10: 5

## 2024-08-06 ASSESSMENT — PAIN DESCRIPTION - LOCATION
LOCATION: GROIN
LOCATION: ABDOMEN

## 2024-08-06 NOTE — PROGRESS NOTES
Social Connections: Not on file   Intimate Partner Violence: Not on file   Depression: Not at risk (7/3/2024)    PHQ-2     PHQ-2 Score: 0   Recent Concern: Depression - At risk (6/4/2024)    PHQ-2     PHQ-2 Score: 4   Housing Stability: Not on file   Interpersonal Safety: Not At Risk (8/4/2024)    Interpersonal Safety Domain Source: IP Abuse Screening     Physical abuse: Denies     Verbal abuse: Denies     Emotional abuse: Denies     Financial abuse: Visitor in room need for follow up     Sexual abuse: Denies   Utilities: Not on file       Review of Systems:   Pertinent items are noted in HPI.       Vital Signs:    Last 24hrs VS reviewed since prior progress note. Most recent are:  Vitals:    08/06/24 1410   BP:    Pulse: 87   Resp:    Temp:    SpO2:          Intake/Output Summary (Last 24 hours) at 8/6/2024 1644  Last data filed at 8/6/2024 1024  Gross per 24 hour   Intake --   Output 1550 ml   Net -1550 ml          Physical Examination:     I had a face to face encounter with this patient and independently examined them on 8/6/2024 as outlined below:          General : alert x 3, awake, no acute distress,   HEENT: PEERL, EOMI, moist mucus membrane  Neck: supple, no JVD, no meningeal signs  Chest: Clear to auscultation bilaterally, currently on 2L, effortlessly breathing, speaking in full sentences   CVS: S1 S2 heard, Capillary refill less than 2 seconds  Abd: soft/ non tender, non distended, BS physiological,   Ext: no clubbing, no cyanosis, no edema, brisk 2+ DP pulses  Neuro/Psych: pleasant mood and affect, CN 2-12 grossly intact, sensory grossly within normal limit, Strength 5/5 in all extremities  Skin: warm     Data Review:    Review and/or order of clinical lab test      I have personally and independently reviewed all pertinent labs, diagnostic studies, imaging, and have provided independent interpretation of the same.     Labs:     Recent Labs     08/05/24  0515 08/06/24  0619   WBC 12.8* 10.4   HGB 12.2  11.8*   HCT 36.5* 35.4*   * 114*       Recent Labs     08/04/24  1305 08/05/24  0515 08/06/24  0619    143 143   K 3.5 3.6 3.5    112* 112*   CO2 29 25 24   BUN 21* 24* 33*       Recent Labs     08/04/24  1305 08/05/24  0515   ALT 35 26   GLOB 3.7 2.8       No results for input(s): \"INR\", \"APTT\" in the last 72 hours.    Invalid input(s): \"PTP\"   No results for input(s): \"TIBC\" in the last 72 hours.    Invalid input(s): \"FE\", \"PSAT\", \"FERR\"   No results found for: \"RBCF\"   No results for input(s): \"PH\", \"PCO2\", \"PO2\" in the last 72 hours.  No results for input(s): \"CPK\" in the last 72 hours.    Invalid input(s): \"CPKMB\", \"CKNDX\", \"TROIQ\"  No results found for: \"CHOL\", \"CHLST\", \"CHOLV\", \"HDL\", \"HDLC\", \"LDL\"  No results found for: \"GLUCPOC\"  [unfilled]    Notes reviewed from all clinical/nonclinical/nursing services involved in patient's clinical care. Care coordination discussions were held with appropriate clinical/nonclinical/ nursing providers based on care coordination needs.         Patients current active Medications were reviewed, considered, added and adjusted based on the clinical condition today.      Home Medications were reconciled to the best of my ability given all available resources at the time of admission. Route is PO if not otherwise noted.      Admission Status:[id number]:::1}      Medications Reviewed:     Current Facility-Administered Medications   Medication Dose Route Frequency    [START ON 8/7/2024] pantoprazole (PROTONIX) tablet 40 mg  40 mg Oral QAM AC    [START ON 8/7/2024] rivaroxaban (XARELTO) tablet 15 mg  15 mg Oral Dinner    cefTRIAXone (ROCEPHIN) 2,000 mg in sterile water 20 mL IV syringe  2,000 mg IntraVENous Q24H    metroNIDAZOLE (FLAGYL) tablet 500 mg  500 mg Oral 3 times per day    furosemide (LASIX) injection 40 mg  40 mg IntraVENous Daily    sodium chloride flush 0.9 % injection 5-40 mL  5-40 mL IntraVENous 2 times per day    sodium chloride flush 0.9 % injection

## 2024-08-06 NOTE — PROGRESS NOTES
Infectious Diseases Follow Up    2024    INFECTIOUS DISEASE Attending:     I agree with the above infectious disease daily progress note in its entirety as authored by and discussed in detail with the nurse practitioner.   I have reviewed pertinent laboratory studies, microbiology cultures, radiologic reports with review of the consultations and progress notes as appropriate.   I agree with today's subjective findings, physical examination, assessment and plan of care as described above and discussed extensively with the nurse practitioner.       Continue Ceftriaxone/Flagyl.    Eventual transition to PO antibiotics when ready for discharge to complete one week minimum post-op.    Assessment & Plan:     79 y.o. male with past medical Hx of mitral regurgitation, s/p MARTIN ER last May 2024 atrial fibrillation, hypertension, CKD and BPH who was admitted for acute appendicitis with microperforation. Infectious disease services was consulted to assist with antibiotic management of ruptured appendicitis.      Acute appendicitis with perforation  Leukocytosis - resolved  S/p lap appendectomy . Unfortunately no intra-op cx available     Continue ceftriaxone IV and flagyl  mg TID. Anticipate 7 days antibiotic course post-op pending clinical status             Subjective:     Complaining of abdominal pain, distended but positive bowel sounds, appears slightly winded  Creatinine worse today, Mixon with dark yellow urine    Objective:     Vitals: BP (!) 148/110   Pulse 88   Temp 98.1 °F (36.7 °C) (Oral)   Resp 23   Ht 1.778 m (5' 10\")   Wt 111.5 kg (245 lb 14.4 oz)   SpO2 95%   BMI 35.28 kg/m²      Tmax:  Temp (24hrs), Av.3 °F (36.8 °C), Min:97.7 °F (36.5 °C), Max:98.8 °F (37.1 °C)      Exam:   Patient is intubated:  no    Exam:    General:  Alert, cooperative, NAD   Eyes:  Sclera anicteric.    Mouth/Throat: Deferred   Neck: Supple   Lungs:   Diminished in bases   CV:  Regular rate and rhythm,no murmur

## 2024-08-06 NOTE — PROGRESS NOTES
General Surgery Progress Note    2 Days Post-Op   APPENDECTOMY LAPAROSCOPIC (Abdomen)   Date:2024       Room:[facility]  Patient Name:Brent Sabillon Jr.     YOB: 1945     Age:79 y.o.    Subjective     No acute surgical issues.  Pt overall is doing well.  Tolerating diet without nausea or vomiting.  Pain is under control. WBC has normalized.  Renal function is a bit worsened       Medications   Scheduled Meds:    rivaroxaban  15 mg Oral Dinner    [START ON 2024] pantoprazole  40 mg Oral QAM AC    cefTRIAXone (ROCEPHIN) IV  2,000 mg IntraVENous Q24H    metroNIDAZOLE  500 mg Oral 3 times per day    furosemide  40 mg IntraVENous Daily    sodium chloride flush  5-40 mL IntraVENous 2 times per day    latanoprost  1 drop Right Eye Nightly    brimonidine  1 drop Right Eye Daily    dorzolamide  1 drop Right Eye TID    timolol  1 drop Right Eye Daily    rosuvastatin  40 mg Oral Nightly    finasteride  5 mg Oral Daily    carvedilol  25 mg Oral BID     Continuous Infusions:    sodium chloride       PRN Meds: sodium chloride flush, sodium chloride, ondansetron **OR** ondansetron, polyethylene glycol, acetaminophen **OR** acetaminophen, HYDROmorphone, HYDROmorphone, oxyCODONE, oxyCODONE        Physical Examination      Vitals:  /75   Pulse 82   Temp 98.4 °F (36.9 °C) (Oral)   Resp 20   Ht 1.778 m (5' 10\")   Wt 111.5 kg (245 lb 14.4 oz)   SpO2 96%   BMI 35.28 kg/m²   Temp (24hrs), Av.3 °F (36.8 °C), Min:97.7 °F (36.5 °C), Max:98.8 °F (37.1 °C)      Physical Exam:    Gen:  No apparent distress  Neuro:  Alert and oriented x4  Pulm:  Unlabored  Abd:  Soft/non-distended/appropriate tenderness to palpation without guarding or rebound  Ext:  No cyanosis, clubbing or edema  Wound:  C/D/I    I/O (24Hr):    Intake/Output Summary (Last 24 hours) at 2024 1257  Last data filed at 2024 1024  Gross per 24 hour   Intake --   Output 1550 ml   Net -1550 ml           Labs/Imaging/Diagnostics

## 2024-08-06 NOTE — PROGRESS NOTES
Clinical Pharmacy Note: IV to PO Automatic Conversion  Please note: Brent Sabillon Jr.’s medication(s) (pantoprazole) has/have been changed from IV to PO based on the following critiera:    Patient is tolerating oral medications  Patient is tolerating a diet more advanced than clear liquids  Patient is not requiring vasopressors    This IV to PO conversion is based on the [employer] approved automatic conversion policy for eligible patients.  Please call with questions.

## 2024-08-07 VITALS
SYSTOLIC BLOOD PRESSURE: 143 MMHG | RESPIRATION RATE: 16 BRPM | HEIGHT: 70 IN | BODY MASS INDEX: 35.2 KG/M2 | DIASTOLIC BLOOD PRESSURE: 87 MMHG | HEART RATE: 89 BPM | WEIGHT: 245.9 LBS | OXYGEN SATURATION: 96 % | TEMPERATURE: 99 F

## 2024-08-07 PROBLEM — K35.32 RUPTURED APPENDICITIS: Status: RESOLVED | Noted: 2024-08-04 | Resolved: 2024-08-07

## 2024-08-07 LAB
ANION GAP SERPL CALC-SCNC: 9 MMOL/L (ref 5–15)
BUN SERPL-MCNC: 27 MG/DL (ref 6–20)
BUN/CREAT SERPL: 16 (ref 12–20)
CALCIUM SERPL-MCNC: 8.2 MG/DL (ref 8.5–10.1)
CHLORIDE SERPL-SCNC: 110 MMOL/L (ref 97–108)
CO2 SERPL-SCNC: 24 MMOL/L (ref 21–32)
CREAT SERPL-MCNC: 1.68 MG/DL (ref 0.7–1.3)
ERYTHROCYTE [DISTWIDTH] IN BLOOD BY AUTOMATED COUNT: 15.7 % (ref 11.5–14.5)
GLUCOSE SERPL-MCNC: 102 MG/DL (ref 65–100)
HCT VFR BLD AUTO: 33.4 % (ref 36.6–50.3)
HGB BLD-MCNC: 11.8 G/DL (ref 12.1–17)
MCH RBC QN AUTO: 29.1 PG (ref 26–34)
MCHC RBC AUTO-ENTMCNC: 35.3 G/DL (ref 30–36.5)
MCV RBC AUTO: 82.5 FL (ref 80–99)
NRBC # BLD: 0 K/UL (ref 0–0.01)
NRBC BLD-RTO: 0 PER 100 WBC
PLATELET # BLD AUTO: 113 K/UL (ref 150–400)
PMV BLD AUTO: 12 FL (ref 8.9–12.9)
POTASSIUM SERPL-SCNC: 3.2 MMOL/L (ref 3.5–5.1)
RBC # BLD AUTO: 4.05 M/UL (ref 4.1–5.7)
SODIUM SERPL-SCNC: 143 MMOL/L (ref 136–145)
WBC # BLD AUTO: 6.5 K/UL (ref 4.1–11.1)

## 2024-08-07 PROCEDURE — 2580000003 HC RX 258: Performed by: INTERNAL MEDICINE

## 2024-08-07 PROCEDURE — 6370000000 HC RX 637 (ALT 250 FOR IP)

## 2024-08-07 PROCEDURE — 99232 SBSQ HOSP IP/OBS MODERATE 35: CPT | Performed by: INTERNAL MEDICINE

## 2024-08-07 PROCEDURE — 6360000002 HC RX W HCPCS

## 2024-08-07 PROCEDURE — 80048 BASIC METABOLIC PNL TOTAL CA: CPT

## 2024-08-07 PROCEDURE — 2580000003 HC RX 258

## 2024-08-07 PROCEDURE — 6370000000 HC RX 637 (ALT 250 FOR IP): Performed by: INTERNAL MEDICINE

## 2024-08-07 PROCEDURE — 85027 COMPLETE CBC AUTOMATED: CPT

## 2024-08-07 RX ORDER — CEFDINIR 300 MG/1
300 CAPSULE ORAL 2 TIMES DAILY
Qty: 8 CAPSULE | Refills: 0 | Status: SHIPPED | OUTPATIENT
Start: 2024-08-07 | End: 2024-08-11

## 2024-08-07 RX ORDER — PANTOPRAZOLE SODIUM 40 MG/1
40 TABLET, DELAYED RELEASE ORAL
Qty: 30 TABLET | Refills: 3 | Status: SHIPPED | OUTPATIENT
Start: 2024-08-08

## 2024-08-07 RX ORDER — METRONIDAZOLE 500 MG/1
500 TABLET ORAL EVERY 8 HOURS SCHEDULED
Qty: 12 TABLET | Refills: 0 | Status: SHIPPED | OUTPATIENT
Start: 2024-08-07 | End: 2024-08-11

## 2024-08-07 RX ADMIN — PANTOPRAZOLE SODIUM 40 MG: 40 TABLET, DELAYED RELEASE ORAL at 06:33

## 2024-08-07 RX ADMIN — BRIMONIDINE TARTRATE 1 DROP: 2 SOLUTION OPHTHALMIC at 09:48

## 2024-08-07 RX ADMIN — TIMOLOL MALEATE 1 DROP: 5 SOLUTION OPHTHALMIC at 09:48

## 2024-08-07 RX ADMIN — METRONIDAZOLE 500 MG: 250 TABLET ORAL at 06:33

## 2024-08-07 RX ADMIN — FUROSEMIDE 40 MG: 10 INJECTION, SOLUTION INTRAMUSCULAR; INTRAVENOUS at 09:47

## 2024-08-07 RX ADMIN — METRONIDAZOLE 500 MG: 250 TABLET ORAL at 13:59

## 2024-08-07 RX ADMIN — CARVEDILOL 25 MG: 12.5 TABLET, FILM COATED ORAL at 09:47

## 2024-08-07 RX ADMIN — DORZOLAMIDE HYDROCHLORIDE 1 DROP: 20 SOLUTION/ DROPS OPHTHALMIC at 09:48

## 2024-08-07 RX ADMIN — WATER 2000 MG: 1 INJECTION INTRAMUSCULAR; INTRAVENOUS; SUBCUTANEOUS at 13:59

## 2024-08-07 RX ADMIN — FINASTERIDE 5 MG: 5 TABLET, FILM COATED ORAL at 09:47

## 2024-08-07 RX ADMIN — SODIUM CHLORIDE, PRESERVATIVE FREE 10 ML: 5 INJECTION INTRAVENOUS at 09:49

## 2024-08-07 RX ADMIN — POTASSIUM BICARBONATE 40 MEQ: 782 TABLET, EFFERVESCENT ORAL at 09:47

## 2024-08-07 ASSESSMENT — PAIN SCALES - GENERAL
PAINLEVEL_OUTOF10: 2
PAINLEVEL_OUTOF10: 2

## 2024-08-07 NOTE — PROGRESS NOTES
Spiritual Health Assessment/Progress Note  [facility]    Initial Encounter,  ,  ,      Name: Brent Sabillon Jr. MRN: [id number]    Age: 79 y.o.     Sex: male   Language: English   Shinto: Confucianist   Ruptured appendicitis     Date: 8/7/2024            Total Time Calculated: 9 min              Spiritual Assessment began in [facility] 5E2 SURGICAL UNIT        Referral/Consult From: Rounding   Encounter Overview/Reason: Initial Encounter  Service Provided For: Patient, Significant other    Asha, Belief, Meaning:   Patient is connected with a asha tradition or spiritual practice  Family/Friends are connected with a asha tradition or spiritual practice      Importance and Influence:  Patient has spiritual/personal beliefs that influence decisions regarding their health  Family/Friends have spiritual/personal beliefs that influence decisions regarding the patient's health    Community:  Patient is connected with a spiritual community and feels well-supported. Support system includes: Spouse/Partner, Children, Asha Community, and Extended family  Family/Friends are connected with a spiritual community: and feel well-supported. Support system includes: Spouse/Partner, Children, Asha Community, and Extended family    Assessment and Plan of Care:     Patient Interventions include: Facilitated expression of thoughts and feelings  Family/Friends Interventions include: Family/Friends Interventions    Patient Plan of Care: Spiritual Care available upon further referral  Family/Friends Plan of Care: Spiritual Care available upon further referral    Electronically signed by Emeka Rodriguezin Resident on 8/7/2024 at 10:32 AM

## 2024-08-07 NOTE — PLAN OF CARE
Started on IV lasix due to fluid overload, still on FC.  No acute changes. Encouraged ambulation and IS.     Problem: Safety - Adult  Goal: Free from fall injury  Outcome: Progressing     Problem: Discharge Planning  Goal: Discharge to home or other facility with appropriate resources  Outcome: Progressing     Problem: Chronic Conditions and Co-morbidities  Goal: Patient's chronic conditions and co-morbidity symptoms are monitored and maintained or improved  Outcome: Progressing     Problem: Pain  Goal: Verbalizes/displays adequate comfort level or baseline comfort level  Outcome: Progressing

## 2024-08-07 NOTE — DISCHARGE INSTR - PHARMACY
Written and verbal instructions provided for pt and his wife with good understanding of information provided.  They are aware of scheduling follow up appointments with surgery, nephrology and PCP.

## 2024-08-07 NOTE — DISCHARGE SUMMARY
Discharge Summary       PATIENT ID: Brent Sabillon Jr.  MRN: [id number]   YOB: 1945    DATE OF ADMISSION: 8/4/2024  1:08 PM    DATE OF DISCHARGE: 8/7/24   PRIMARY CARE PROVIDER: Yasmine Mascorro MD     ATTENDING PHYSICIAN: KAMALA Sparrow MD  DISCHARGING PROVIDER: Maryse Pino PA-C    To contact this individual call [phone] and ask the  to page.  If unavailable ask to be transferred the Adult Hospitalist Department.    CONSULTATIONS: IP CONSULT TO GENERAL SURGERY  IP CONSULT TO INFECTIOUS DISEASES    PROCEDURES/SURGERIES: Procedure(s):  APPENDECTOMY LAPAROSCOPIC     ADMITTING DIAGNOSES & HOSPITAL COURSE:     Brent Sabillon is a 79 y.o. male with history of mitral regurgitation, s/p MARTIN ER last May 2024 atrial fibrillation, hypertension, CKD and BPH who presents with acute onset of abdominal pain. Patient went to bed last night feeling well but woke up this morning with lower abdominal pain. He initially felt it did not that he might be constipated and took 2 Dulcolax tablets and subsequently had a small bowel movement. He then vomited clear liquid and continues to experience pain and therefore decided to seek consultation in the ED. CT scan of the abdomen demonstrates acute appendicitis with microperforation and minimal free intraperitoneal air but no abscess. General surgery was consulted and HM service asked to admit     8/7  Patient seen and examined this am. No complaints at this time. Effortlessly breathing on RA. Pt ambulates independently.  Agrees with dc today.   ID and gen surg cleared for dc today.  Return precautions provided. Patient verbalizes understanding and agrees with the plan.         DISCHARGE DIAGNOSES / PLAN:      Appendicitis with microperforation   S/p Appendectomy 8/4  -IVF  -Received 1 day of IV zosyn -- start Rocephin and Flagyl today per ID -- dc on cefdinir and flagyl   -General surgery consulted, cleared for dc today  - Laparoscopic appendectomy 8/4  -

## 2024-08-07 NOTE — PROGRESS NOTES
Mixon cath removed per orders of Maryse GAGNON. Pt instructed on use of urinal to measure urine and to notify nurse if he has urge to urinate but is unable to

## 2024-08-07 NOTE — PROGRESS NOTES
Infectious Diseases Follow Up    2024    INFECTIOUS DISEASE Attending:     I agree with the above infectious disease daily progress note in its entirety as authored by and discussed in detail with the nurse practitioner.   I have reviewed pertinent laboratory studies, microbiology cultures, radiologic reports with review of the consultations and progress notes as appropriate.   I agree with today's subjective findings, physical examination, assessment and plan of care as described above and discussed extensively with the nurse practitioner.       Continue Ceftriaxone/Flagyl while inpatient.    PO Cefdinir and Flagyl when ready for discharge until 24, inclusive.    Will sign off, please call with questions.    Assessment & Plan:     79 y.o. male with past medical Hx of mitral regurgitation, s/p MARTIN ER last May 2024 atrial fibrillation, hypertension, CKD and BPH who was admitted for acute appendicitis with microperforation. Infectious disease services was consulted to assist with antibiotic management of ruptured appendicitis.      Acute appendicitis with perforation  Leukocytosis - resolved  S/p lap appendectomy . Unfortunately no intra-op cx available     Continue ceftriaxone IV and flagyl  mg TID while inpatient. Can transition to cefdinir  mg BID and flagyl  mg TID 24, inclusive, when ready for discharge.      ID team signing off. Please reach out with any questions.            Subjective:     Creatinine better today. Ambulating in room. Abdominal pain improved, abdomen less distended, had bowel movement.     Objective:     Vitals: BP (!) 150/109   Pulse 90   Temp 98.6 °F (37 °C) (Oral)   Resp 18   Ht 1.778 m (5' 10\")   Wt 111.5 kg (245 lb 14.4 oz)   SpO2 97%   BMI 35.28 kg/m²      Tmax:  Temp (24hrs), Av.3 °F (36.8 °C), Min:98.1 °F (36.7 °C), Max:98.6 °F (37 °C)      Exam:   Patient is intubated:  no    Exam:    General:  Alert, cooperative, NAD   Eyes:  Sclera

## 2024-08-12 ENCOUNTER — HOSPITAL ENCOUNTER (EMERGENCY)
Facility: HOSPITAL | Age: 79
Discharge: HOME | End: 2024-08-12
Attending: EMERGENCY MEDICINE
Payer: MEDICARE

## 2024-08-12 VITALS
HEART RATE: 72 BPM | WEIGHT: 197.97 LBS | RESPIRATION RATE: 18 BRPM | OXYGEN SATURATION: 95 % | SYSTOLIC BLOOD PRESSURE: 177 MMHG | BODY MASS INDEX: 28.41 KG/M2 | TEMPERATURE: 97.9 F | DIASTOLIC BLOOD PRESSURE: 98 MMHG

## 2024-08-12 DIAGNOSIS — Z48.89 ENCOUNTER FOR POST SURGICAL WOUND CHECK: Primary | ICD-10-CM

## 2024-08-12 PROCEDURE — 99282 EMERGENCY DEPT VISIT SF MDM: CPT

## 2024-08-12 RX ORDER — POTASSIUM CHLORIDE 1500 MG/1
20 TABLET, EXTENDED RELEASE ORAL DAILY
Qty: 90 TABLET | Refills: 3 | Status: SHIPPED | OUTPATIENT
Start: 2024-08-12

## 2024-08-12 ASSESSMENT — ENCOUNTER SYMPTOMS
NAUSEA: 0
BACK PAIN: 0
ABDOMINAL PAIN: 0
RECTAL PAIN: 0
COUGH: 0
SHORTNESS OF BREATH: 0
VOMITING: 0

## 2024-08-12 NOTE — TELEPHONE ENCOUNTER
Requested Prescriptions     Signed Prescriptions Disp Refills    KLOR-CON M20 20 MEQ extended release tablet 90 tablet 3     Sig: Take 1 tablet by mouth daily     Authorizing Provider: NIKHIL BOYD     Ordering User: JAHAIRA PETERSEN MD    Future Appointments   Date Time Provider Department Center   8/13/2024  9:30 AM [facility] CARDIAC WELLNESS CLINIC [facility]   8/13/2024 10:30 AM [facility] CARDIAC WELLNESS EXERCISE [facility]   8/20/2024  9:30 AM [facility] CARDIAC WELLNESS CLINIC [facility]   8/20/2024 10:30 AM [facility] CARDIAC WELLNESS EXERCISE [facility]   8/27/2024 10:30 AM [facility] CARDIAC WELLNESS EXERCISE [facility]   4/18/2025 10:30 AM [facility] ECHO LAB 1 [facility]   4/18/2025 11:30 AM Destiny Wolfe APRN - [facility] BS AMB

## 2024-08-12 NOTE — ED TRIAGE NOTES
TRIAGE: Pt arrived ambulatory reporting tape needs to be removed from surgical incision s/p appendectomy.    Reports poor appetite that's not new, increased urination and BMs, endorses taking pain medications    NP removed tape in triage and educated on wound care

## 2024-08-12 NOTE — ED PROVIDER NOTES
drop into the right eye 3 times dailyHistorical Med      finasteride (PROSCAR) 5 MG tablet Take 1 tablet by mouth daily ceived the following from [facility]: Outside name: finasteride (PROSCAR) 5 mg tabletHistorical Med      rosuvastatin (CRESTOR) 20 MG tablet Take 2 tablets by mouth nightlyHistorical Med      timolol (TIMOPTIC) 0.5 % ophthalmic solution Place 1 drop into the right eye dailyHistorical Med             ALLERGIES     Patient has no known allergies.    FAMILY HISTORY       Family History   Problem Relation Age of Onset    No Known Problems Mother     No Known Problems Father     Lung Cancer Sister     Lung Cancer Brother     Anesth Problems Neg Hx           SOCIAL HISTORY       Social History     Socioeconomic History    Marital status:    Tobacco Use    Smoking status: Former     Current packs/day: 0.00     Average packs/day: 1 pack/day for 2.0 years (2.0 ttl pk-yrs)     Types: Cigarettes     Start date: 1962     Quit date: 1964     Years since quittin.6     Passive exposure: Never    Smokeless tobacco: Never   Vaping Use    Vaping status: Never Used   Substance and Sexual Activity    Alcohol use: No    Drug use: Never           PHYSICAL EXAM    (up to 7 for level 4, 8 or more for level 5)     ED Triage Vitals [24 0915]   BP Systolic BP Percentile Diastolic BP Percentile Temp Temp Source Pulse Respirations SpO2   (!) 177/98 -- -- 97.9 °F (36.6 °C) Oral 72 18 95 %      Height Weight - Scale         -- 89.8 kg (197 lb 15.6 oz)             Body mass index is 28.41 kg/m².    Physical Exam  Vitals and nursing note reviewed.   Constitutional:       Appearance: Normal appearance. He is normal weight.      Comments: Elderly black male; non-smoker; ; 8 days post laparoscopic appendectomy   HENT:      Head: Normocephalic.   Cardiovascular:      Rate and Rhythm: Normal rate and regular rhythm.   Pulmonary:      Effort: Pulmonary effort is normal.      Breath

## 2024-08-20 ENCOUNTER — APPOINTMENT (OUTPATIENT)
Facility: HOSPITAL | Age: 79
End: 2024-08-20
Payer: MEDICARE

## 2024-08-20 ENCOUNTER — HOSPITAL ENCOUNTER (EMERGENCY)
Facility: HOSPITAL | Age: 79
Discharge: HOME | End: 2024-08-20
Attending: EMERGENCY MEDICINE
Payer: MEDICARE

## 2024-08-20 VITALS
OXYGEN SATURATION: 97 % | HEART RATE: 76 BPM | WEIGHT: 188.93 LBS | RESPIRATION RATE: 24 BRPM | TEMPERATURE: 97.5 F | DIASTOLIC BLOOD PRESSURE: 91 MMHG | SYSTOLIC BLOOD PRESSURE: 155 MMHG | BODY MASS INDEX: 27.11 KG/M2

## 2024-08-20 DIAGNOSIS — R06.02 SHORTNESS OF BREATH: ICD-10-CM

## 2024-08-20 DIAGNOSIS — I50.9 ACUTE ON CHRONIC CONGESTIVE HEART FAILURE, UNSPECIFIED HEART FAILURE TYPE (HCC): Primary | ICD-10-CM

## 2024-08-20 LAB
ALBUMIN SERPL-MCNC: 3.1 G/DL (ref 3.5–5)
ALBUMIN/GLOB SERPL: 0.8 (ref 1.1–2.2)
ALP SERPL-CCNC: 64 U/L (ref 45–117)
ALT SERPL-CCNC: 52 U/L (ref 12–78)
ANION GAP SERPL CALC-SCNC: 5 MMOL/L (ref 5–15)
APPEARANCE UR: CLEAR
AST SERPL-CCNC: 51 U/L (ref 15–37)
BACTERIA URNS QL MICRO: NEGATIVE /HPF
BASOPHILS # BLD: 0 K/UL (ref 0–0.1)
BASOPHILS NFR BLD: 0 % (ref 0–1)
BILIRUB SERPL-MCNC: 1.4 MG/DL (ref 0.2–1)
BILIRUB UR QL: NEGATIVE
BUN SERPL-MCNC: 17 MG/DL (ref 6–20)
BUN/CREAT SERPL: 9 (ref 12–20)
CALCIUM SERPL-MCNC: 9.5 MG/DL (ref 8.5–10.1)
CHLORIDE SERPL-SCNC: 106 MMOL/L (ref 97–108)
CO2 SERPL-SCNC: 30 MMOL/L (ref 21–32)
COLOR UR: ABNORMAL
COMMENT:: NORMAL
CREAT SERPL-MCNC: 1.83 MG/DL (ref 0.7–1.3)
DIFFERENTIAL METHOD BLD: ABNORMAL
EKG DIAGNOSIS: NORMAL
EKG Q-T INTERVAL: 400 MS
EKG QRS DURATION: 110 MS
EKG QTC CALCULATION (BAZETT): 481 MS
EKG R AXIS: 56 DEGREES
EKG T AXIS: 13 DEGREES
EKG VENTRICULAR RATE: 87 BPM
EOSINOPHIL # BLD: 0 K/UL (ref 0–0.4)
EOSINOPHIL NFR BLD: 1 % (ref 0–7)
EPITH CASTS URNS QL MICRO: ABNORMAL /LPF
ERYTHROCYTE [DISTWIDTH] IN BLOOD BY AUTOMATED COUNT: 15.8 % (ref 11.5–14.5)
GLOBULIN SER CALC-MCNC: 4 G/DL (ref 2–4)
GLUCOSE SERPL-MCNC: 154 MG/DL (ref 65–100)
GLUCOSE UR STRIP.AUTO-MCNC: NEGATIVE MG/DL
HCT VFR BLD AUTO: 42.2 % (ref 36.6–50.3)
HGB BLD-MCNC: 13.8 G/DL (ref 12.1–17)
HGB UR QL STRIP: ABNORMAL
HYALINE CASTS URNS QL MICRO: ABNORMAL /LPF (ref 0–5)
IMM GRANULOCYTES # BLD AUTO: 0 K/UL (ref 0–0.04)
IMM GRANULOCYTES NFR BLD AUTO: 0 % (ref 0–0.5)
KETONES UR QL STRIP.AUTO: NEGATIVE MG/DL
LEUKOCYTE ESTERASE UR QL STRIP.AUTO: NEGATIVE
LIPASE SERPL-CCNC: 23 U/L (ref 13–75)
LYMPHOCYTES # BLD: 1 K/UL (ref 0.8–3.5)
LYMPHOCYTES NFR BLD: 17 % (ref 12–49)
MCH RBC QN AUTO: 27.9 PG (ref 26–34)
MCHC RBC AUTO-ENTMCNC: 32.7 G/DL (ref 30–36.5)
MCV RBC AUTO: 85.4 FL (ref 80–99)
MONOCYTES # BLD: 0.5 K/UL (ref 0–1)
MONOCYTES NFR BLD: 8 % (ref 5–13)
NEUTS SEG # BLD: 4.2 K/UL (ref 1.8–8)
NEUTS SEG NFR BLD: 74 % (ref 32–75)
NITRITE UR QL STRIP.AUTO: NEGATIVE
NRBC # BLD: 0 K/UL (ref 0–0.01)
NRBC BLD-RTO: 0 PER 100 WBC
NT PRO BNP: 9801 PG/ML
PH UR STRIP: 7.5 (ref 5–8)
PLATELET # BLD AUTO: 213 K/UL (ref 150–400)
PMV BLD AUTO: 11.6 FL (ref 8.9–12.9)
POTASSIUM SERPL-SCNC: 3.5 MMOL/L (ref 3.5–5.1)
PROT SERPL-MCNC: 7.1 G/DL (ref 6.4–8.2)
PROT UR STRIP-MCNC: 100 MG/DL
RBC # BLD AUTO: 4.94 M/UL (ref 4.1–5.7)
RBC #/AREA URNS HPF: ABNORMAL /HPF (ref 0–5)
SODIUM SERPL-SCNC: 141 MMOL/L (ref 136–145)
SP GR UR REFRACTOMETRY: 1.01 (ref 1–1.03)
SPECIMEN HOLD: NORMAL
SPECIMEN HOLD: NORMAL
TROPONIN I SERPL HS-MCNC: 35 NG/L (ref 0–76)
UROBILINOGEN UR QL STRIP.AUTO: 0.2 EU/DL (ref 0.2–1)
WBC # BLD AUTO: 5.7 K/UL (ref 4.1–11.1)
WBC URNS QL MICRO: ABNORMAL /HPF (ref 0–4)

## 2024-08-20 PROCEDURE — 83880 ASSAY OF NATRIURETIC PEPTIDE: CPT

## 2024-08-20 PROCEDURE — 93005 ELECTROCARDIOGRAM TRACING: CPT | Performed by: EMERGENCY MEDICINE

## 2024-08-20 PROCEDURE — 36415 COLL VENOUS BLD VENIPUNCTURE: CPT

## 2024-08-20 PROCEDURE — 80053 COMPREHEN METABOLIC PANEL: CPT

## 2024-08-20 PROCEDURE — 84484 ASSAY OF TROPONIN QUANT: CPT

## 2024-08-20 PROCEDURE — 6360000004 HC RX CONTRAST MEDICATION: Performed by: STUDENT IN AN ORGANIZED HEALTH CARE EDUCATION/TRAINING PROGRAM

## 2024-08-20 PROCEDURE — 81001 URINALYSIS AUTO W/SCOPE: CPT

## 2024-08-20 PROCEDURE — 85025 COMPLETE CBC W/AUTO DIFF WBC: CPT

## 2024-08-20 PROCEDURE — 99285 EMERGENCY DEPT VISIT HI MDM: CPT

## 2024-08-20 PROCEDURE — 71275 CT ANGIOGRAPHY CHEST: CPT

## 2024-08-20 PROCEDURE — 83690 ASSAY OF LIPASE: CPT

## 2024-08-20 RX ORDER — SPIRONOLACTONE 25 MG/1
25 TABLET ORAL DAILY
Qty: 14 TABLET | Refills: 0 | Status: SHIPPED | OUTPATIENT
Start: 2024-08-20

## 2024-08-20 RX ADMIN — IOPAMIDOL 100 ML: 755 INJECTION, SOLUTION INTRAVENOUS at 14:04

## 2024-08-20 ASSESSMENT — PAIN SCALES - GENERAL: PAINLEVEL_OUTOF10: 0

## 2024-08-20 ASSESSMENT — PAIN - FUNCTIONAL ASSESSMENT
PAIN_FUNCTIONAL_ASSESSMENT: 0-10
PAIN_FUNCTIONAL_ASSESSMENT: NONE - DENIES PAIN

## 2024-08-20 NOTE — ED TRIAGE NOTES
Pt c/o sob that started this am with bilateral leg swelling, denies cp, denies cough or fever , pt had heart surgery in May and appendectomy a few weeks ago

## 2024-08-20 NOTE — ED PROVIDER NOTES
tablet by mouth daily    BIMATOPROST (LUMIGAN) 0.01 % SOLN OPHTHALMIC DROPS    Place 1 drop into the right eye nightly    BRIMONIDINE (ALPHAGAN P) 0.1 % SOLN    Place 1 drop into the right eye daily    CARVEDILOL (COREG) 25 MG TABLET    Take 1 tablet by mouth 2 times daily    DORZOLAMIDE (TRUSOPT) 2 % OPHTHALMIC SOLUTION    Place 1 drop into the right eye 3 times daily    DOXAZOSIN (CARDURA) 4 MG TABLET    Take 1 tablet by mouth daily    FINASTERIDE (PROSCAR) 5 MG TABLET    Take 1 tablet by mouth daily ceived the following from [facility]: Outside name: finasteride (PROSCAR) 5 mg tablet    FUROSEMIDE (LASIX) 40 MG TABLET    Take 1 tablet by mouth daily    KLOR-CON M20 20 MEQ EXTENDED RELEASE TABLET    Take 1 tablet by mouth daily    LOSARTAN (COZAAR) 100 MG TABLET    Take 1 tablet by mouth daily    PANTOPRAZOLE (PROTONIX) 40 MG TABLET    Take 1 tablet by mouth every morning (before breakfast)    RIVAROXABAN (XARELTO) 15 MG TABS TABLET    Take 1 tablet by mouth Daily with supper    ROSUVASTATIN (CRESTOR) 20 MG TABLET    Take 2 tablets by mouth nightly    TIMOLOL (TIMOPTIC) 0.5 % OPHTHALMIC SOLUTION    Place 1 drop into the right eye daily       ALLERGIES     Patient has no known allergies.    FAMILY HISTORY       Family History   Problem Relation Age of Onset    No Known Problems Mother     No Known Problems Father     Lung Cancer Sister     Lung Cancer Brother     Anesth Problems Neg Hx           SOCIAL HISTORY       Social History     Socioeconomic History    Marital status:    Tobacco Use    Smoking status: Former     Current packs/day: 0.00     Average packs/day: 1 pack/day for 2.0 years (2.0 ttl pk-yrs)     Types: Cigarettes     Start date: 1962     Quit date: 1964     Years since quittin.6     Passive exposure: Never    Smokeless tobacco: Never   Vaping Use    Vaping status: Never Used   Substance and Sexual Activity    Alcohol use: No    Drug use: Never         PHYSICAL

## 2024-08-21 ENCOUNTER — TELEPHONE (OUTPATIENT)
Age: 79
End: 2024-08-21

## 2024-08-21 NOTE — TELEPHONE ENCOUNTER
Patient wife Virginia called stating that the patient was seen in the ER for fluid build up round the heart. Virginia stated the patient was prescribed spironolactone 25mg during his visit. Virginia would like a call back to review medications.     # [phone]

## 2024-08-22 ENCOUNTER — OFFICE VISIT (OUTPATIENT)
Age: 79
End: 2024-08-22

## 2024-08-22 VITALS
HEIGHT: 70 IN | TEMPERATURE: 97.9 F | OXYGEN SATURATION: 96 % | HEART RATE: 63 BPM | RESPIRATION RATE: 15 BRPM | WEIGHT: 166 LBS | DIASTOLIC BLOOD PRESSURE: 98 MMHG | BODY MASS INDEX: 23.77 KG/M2 | SYSTOLIC BLOOD PRESSURE: 148 MMHG

## 2024-08-22 DIAGNOSIS — Z09 POSTOP CHECK: Primary | ICD-10-CM

## 2024-08-22 PROCEDURE — 99024 POSTOP FOLLOW-UP VISIT: CPT | Performed by: NURSE PRACTITIONER

## 2024-08-22 ASSESSMENT — PATIENT HEALTH QUESTIONNAIRE - PHQ9
2. FEELING DOWN, DEPRESSED OR HOPELESS: NOT AT ALL
SUM OF ALL RESPONSES TO PHQ QUESTIONS 1-9: 0
1. LITTLE INTEREST OR PLEASURE IN DOING THINGS: NOT AT ALL
SUM OF ALL RESPONSES TO PHQ QUESTIONS 1-9: 0
SUM OF ALL RESPONSES TO PHQ QUESTIONS 1-9: 0
SUM OF ALL RESPONSES TO PHQ9 QUESTIONS 1 & 2: 0
SUM OF ALL RESPONSES TO PHQ QUESTIONS 1-9: 0

## 2024-08-22 NOTE — PROGRESS NOTES
Identified patient with two patient identifiers (name and ). Reviewed chart in preparation for visit and have obtained necessary documentation.    Brent Sabillon Jr. is a 79 y.o. male  Chief Complaint   Patient presents with    Post-Op Check     2 weeks post lap appendectomy     BP (!) 148/98 (Site: Left Upper Arm, Position: Sitting, Cuff Size: Large Adult)   Pulse 63   Temp 97.9 °F (36.6 °C) (Oral)   Resp 15   Ht 1.778 m (5' 10\")   Wt 75.3 kg (166 lb)   SpO2 96%   BMI 23.82 kg/m²     1. Have you been to the ER, urgent care clinic since your last visit?  Hospitalized since your last visit?Yes  and  [facility] ER    2. Have you seen or consulted any other health care providers outside of the [facility] since your last visit?  Include any pap smears or colon screening. No    Patient and provider made aware of elevated BP x2. Patient asymptomatic. Patient reminded to monitor BP, continue to take BP medications if prescribed, and follow up with PCP/Cardiologist.  Patient expressed understanding and agreement.

## 2024-08-22 NOTE — TELEPHONE ENCOUNTER
Left HIPAA compliant  I will call back    Future Appointments   Date Time Provider Department Center   8/22/2024  8:40 AM Mariah Correa APRN - NP [facility] BS AMB   4/18/2025 10:30 AM [facility] ECHO LAB 1 [facility]   4/18/2025 11:30 AM Destiny Wolfe APRN - NP [facility] BS AMB

## 2024-08-22 NOTE — PROGRESS NOTES
Subjective:      Brent Sabillon Jr. is a 79 y.o. male presents for postop care 2 weeks status post laparoscopic appendectomy  Appetite is fair. Eating a regular diet without difficulty.   Bowel movements are regular.  No complaints of pain.   He was seen in the ER for shortness of breath and was discharged home with sprinalctone. He is suppose to follow up with Dr. Rico.       Mr. Sabillon has a reminder for a \"due or due soon\" health maintenance. I have asked that he contact his primary care provider for follow-up on this health maintenance.      Objective:     BP (!) 148/98 (Site: Left Upper Arm, Position: Sitting, Cuff Size: Large Adult)   Pulse 63   Temp 97.9 °F (36.6 °C) (Oral)   Resp 15   Ht 1.778 m (5' 10\")   Wt 75.3 kg (166 lb)   SpO2 96%   BMI 23.82 kg/m²     General:  alert, cooperative   Abdomen: soft, bowel sounds active, non-tender   Incision:   healing well, no drainage, no erythema, no dehiscence, incision well approximated     Assessment:     Doing well postoperatively.    Plan:     Advised to follow up with Dr. Rico.   Follow up as needed here.   Diet as tolerated.   Path reviewed with patient.   MYESHA Pandya - [facility]

## 2024-08-26 ENCOUNTER — TELEPHONE (OUTPATIENT)
Facility: HOSPITAL | Age: 79
End: 2024-08-26

## 2024-08-26 NOTE — TELEPHONE ENCOUNTER
Spoke with Brent Sabillon Jr. Regarding his return to cardiac rehab after his hospitalization.  He is supposed to follow up with Dr. Rico about his medications and SOB.  He will call after he sees the doctor to make future appointments.  We will continue to follow.  Martha Szymanski RN

## 2024-08-31 ENCOUNTER — HOSPITAL ENCOUNTER (EMERGENCY)
Facility: HOSPITAL | Age: 79
Discharge: HOME | End: 2024-08-31
Attending: EMERGENCY MEDICINE
Payer: MEDICARE

## 2024-08-31 ENCOUNTER — APPOINTMENT (OUTPATIENT)
Facility: HOSPITAL | Age: 79
End: 2024-08-31
Payer: MEDICARE

## 2024-08-31 VITALS
HEART RATE: 70 BPM | SYSTOLIC BLOOD PRESSURE: 138 MMHG | RESPIRATION RATE: 14 BRPM | TEMPERATURE: 97.6 F | BODY MASS INDEX: 26.07 KG/M2 | OXYGEN SATURATION: 94 % | DIASTOLIC BLOOD PRESSURE: 88 MMHG | WEIGHT: 181.66 LBS

## 2024-08-31 DIAGNOSIS — N18.9 CHRONIC KIDNEY DISEASE, UNSPECIFIED CKD STAGE: ICD-10-CM

## 2024-08-31 DIAGNOSIS — I50.9 ACUTE CONGESTIVE HEART FAILURE, UNSPECIFIED HEART FAILURE TYPE (HCC): Primary | ICD-10-CM

## 2024-08-31 DIAGNOSIS — R60.0 PERIPHERAL EDEMA: ICD-10-CM

## 2024-08-31 LAB
ALBUMIN SERPL-MCNC: 3.4 G/DL (ref 3.5–5)
ALBUMIN/GLOB SERPL: 0.9 (ref 1.1–2.2)
ALP SERPL-CCNC: 62 U/L (ref 45–117)
ALT SERPL-CCNC: 35 U/L (ref 12–78)
ANION GAP SERPL CALC-SCNC: 5 MMOL/L (ref 5–15)
AST SERPL-CCNC: 36 U/L (ref 15–37)
BASOPHILS # BLD: 0 K/UL (ref 0–0.1)
BASOPHILS NFR BLD: 0 % (ref 0–1)
BILIRUB SERPL-MCNC: 1.5 MG/DL (ref 0.2–1)
BUN SERPL-MCNC: 23 MG/DL (ref 6–20)
BUN/CREAT SERPL: 12 (ref 12–20)
CALCIUM SERPL-MCNC: 9.4 MG/DL (ref 8.5–10.1)
CHLORIDE SERPL-SCNC: 108 MMOL/L (ref 97–108)
CO2 SERPL-SCNC: 28 MMOL/L (ref 21–32)
COMMENT:: NORMAL
CREAT SERPL-MCNC: 1.95 MG/DL (ref 0.7–1.3)
DIFFERENTIAL METHOD BLD: ABNORMAL
EKG DIAGNOSIS: NORMAL
EKG Q-T INTERVAL: 392 MS
EKG QRS DURATION: 106 MS
EKG QTC CALCULATION (BAZETT): 446 MS
EKG R AXIS: 60 DEGREES
EKG T AXIS: -37 DEGREES
EKG VENTRICULAR RATE: 78 BPM
EOSINOPHIL # BLD: 0.1 K/UL (ref 0–0.4)
EOSINOPHIL NFR BLD: 3 % (ref 0–7)
ERYTHROCYTE [DISTWIDTH] IN BLOOD BY AUTOMATED COUNT: 16.5 % (ref 11.5–14.5)
GLOBULIN SER CALC-MCNC: 3.6 G/DL (ref 2–4)
GLUCOSE SERPL-MCNC: 117 MG/DL (ref 65–100)
HCT VFR BLD AUTO: 42.7 % (ref 36.6–50.3)
HGB BLD-MCNC: 13.9 G/DL (ref 12.1–17)
IMM GRANULOCYTES # BLD AUTO: 0 K/UL (ref 0–0.04)
IMM GRANULOCYTES NFR BLD AUTO: 0 % (ref 0–0.5)
LYMPHOCYTES # BLD: 0.9 K/UL (ref 0.8–3.5)
LYMPHOCYTES NFR BLD: 20 % (ref 12–49)
MAGNESIUM SERPL-MCNC: 2.3 MG/DL (ref 1.6–2.4)
MCH RBC QN AUTO: 27.8 PG (ref 26–34)
MCHC RBC AUTO-ENTMCNC: 32.6 G/DL (ref 30–36.5)
MCV RBC AUTO: 85.4 FL (ref 80–99)
MONOCYTES # BLD: 0.4 K/UL (ref 0–1)
MONOCYTES NFR BLD: 9 % (ref 5–13)
NEUTS SEG # BLD: 3.2 K/UL (ref 1.8–8)
NEUTS SEG NFR BLD: 68 % (ref 32–75)
NRBC # BLD: 0 K/UL (ref 0–0.01)
NRBC BLD-RTO: 0 PER 100 WBC
NT PRO BNP: 5225 PG/ML
PLATELET # BLD AUTO: 150 K/UL (ref 150–400)
PMV BLD AUTO: 11.2 FL (ref 8.9–12.9)
POTASSIUM SERPL-SCNC: 3.8 MMOL/L (ref 3.5–5.1)
PROT SERPL-MCNC: 7 G/DL (ref 6.4–8.2)
RBC # BLD AUTO: 5 M/UL (ref 4.1–5.7)
RBC MORPH BLD: ABNORMAL
SODIUM SERPL-SCNC: 141 MMOL/L (ref 136–145)
SPECIMEN HOLD: NORMAL
TROPONIN I SERPL HS-MCNC: 50 NG/L (ref 0–76)
WBC # BLD AUTO: 4.6 K/UL (ref 4.1–11.1)

## 2024-08-31 PROCEDURE — 71045 X-RAY EXAM CHEST 1 VIEW: CPT

## 2024-08-31 PROCEDURE — 99223 1ST HOSP IP/OBS HIGH 75: CPT | Performed by: INTERNAL MEDICINE

## 2024-08-31 PROCEDURE — 36415 COLL VENOUS BLD VENIPUNCTURE: CPT

## 2024-08-31 PROCEDURE — 83880 ASSAY OF NATRIURETIC PEPTIDE: CPT

## 2024-08-31 PROCEDURE — 85025 COMPLETE CBC W/AUTO DIFF WBC: CPT

## 2024-08-31 PROCEDURE — 99285 EMERGENCY DEPT VISIT HI MDM: CPT

## 2024-08-31 PROCEDURE — 96374 THER/PROPH/DIAG INJ IV PUSH: CPT

## 2024-08-31 PROCEDURE — 80053 COMPREHEN METABOLIC PANEL: CPT

## 2024-08-31 PROCEDURE — 6360000002 HC RX W HCPCS: Performed by: EMERGENCY MEDICINE

## 2024-08-31 PROCEDURE — 83735 ASSAY OF MAGNESIUM: CPT

## 2024-08-31 PROCEDURE — 93005 ELECTROCARDIOGRAM TRACING: CPT | Performed by: PHYSICIAN ASSISTANT

## 2024-08-31 PROCEDURE — 93010 ELECTROCARDIOGRAM REPORT: CPT | Performed by: INTERNAL MEDICINE

## 2024-08-31 PROCEDURE — 84484 ASSAY OF TROPONIN QUANT: CPT

## 2024-08-31 RX ORDER — FUROSEMIDE 40 MG
40 TABLET ORAL 2 TIMES DAILY
Qty: 60 TABLET | Refills: 3 | Status: SHIPPED | OUTPATIENT
Start: 2024-08-31 | End: 2025-02-27

## 2024-08-31 RX ORDER — FUROSEMIDE 10 MG/ML
80 INJECTION INTRAMUSCULAR; INTRAVENOUS ONCE
Status: COMPLETED | OUTPATIENT
Start: 2024-08-31 | End: 2024-08-31

## 2024-08-31 RX ORDER — IPRATROPIUM BROMIDE AND ALBUTEROL SULFATE 2.5; .5 MG/3ML; MG/3ML
1 SOLUTION RESPIRATORY (INHALATION) ONCE
Status: DISCONTINUED | OUTPATIENT
Start: 2024-08-31 | End: 2024-08-31

## 2024-08-31 RX ORDER — FUROSEMIDE 10 MG/ML
40 INJECTION INTRAMUSCULAR; INTRAVENOUS ONCE
Status: DISCONTINUED | OUTPATIENT
Start: 2024-08-31 | End: 2024-08-31

## 2024-08-31 RX ADMIN — FUROSEMIDE 80 MG: 10 INJECTION, SOLUTION INTRAMUSCULAR; INTRAVENOUS at 10:31

## 2024-08-31 ASSESSMENT — PAIN SCALES - GENERAL: PAINLEVEL_OUTOF10: 0

## 2024-08-31 ASSESSMENT — ENCOUNTER SYMPTOMS
WHEEZING: 0
COUGH: 1
ANAL BLEEDING: 0
DIARRHEA: 0
SHORTNESS OF BREATH: 1
BLOOD IN STOOL: 0
NAUSEA: 0
ABDOMINAL PAIN: 0
ABDOMINAL DISTENTION: 0
VOMITING: 0

## 2024-08-31 ASSESSMENT — PAIN - FUNCTIONAL ASSESSMENT: PAIN_FUNCTIONAL_ASSESSMENT: 0-10

## 2024-08-31 NOTE — ED NOTES
Assumed care of patient. Pt resting in position of comfort. Call bell within reach.    RT called for neb

## 2024-08-31 NOTE — ED TRIAGE NOTES
Triage: Pt arrives ambulatory from home with CC of shortness of breath and cough. Denies chest pain. Endorses leg swelling. Hx of CHF and atrial fibrillation.

## 2024-08-31 NOTE — ED NOTES
Discharge instructions reviewed with pt and wife by provider. Pt and wife verbalized understanding of discharge instructions. Copy of discharge paperwork given. Patient condition stable, respiratory status within normal limits, neuro status intact. ambulatory out of er, accompanied by wife

## 2024-08-31 NOTE — ED NOTES
Pt presents to ED with reports of gradually worsening SOB with bilateral lower leg swelling.      Per Dr Monson hold lasix and neb at this time. RT made aware

## 2024-08-31 NOTE — CONSULTS
[facility] CARDIOLOGY                    Cardiology Hospital Care Note     [x]Initial Encounter     []Follow-up    Patient Name: Brent Sabillon Jr. - :1945 - MRN:[id number]  Primary Cardiologist: José Antonio Rico MD  Consulting Cardiologist: Tony Hebert MD     Reason for encounter: shortness of breaths    HPI:       Brent Sabillon Jr. is a 79 y.o. male with PMH significant for MR and ARCHANA with joellen clip XT 2024  He then had appendicitis and appendectomy with CHF in early August, sent home on lasix 40 mg qd and then back to ER  with dyspnea and aldactone added  Office nurse of Dr Allen called for appt  He is now back to ER and there is no major change in BNP and xray  He has chronic AF and BNP 5000 range as early August but lower than 9000 in 2024  His V rate is controlled  Left heart cath 3/2024 had mild CAD  Echo 2024 normal LVEF  Also sees Dr Rico as primary cardiologist    Wife at bedside     Assessment and Plan     Recurrent dyspnea with no major change except for may be mild pulmonary edema on chest xray.  Dr Bailon of radiology to finalize result  Discussed with ER attending Dr Meza  Patient agrees with lasix 40 gm bid and will see if he can come to office for weight, labs BMP next week after Labor Day since he has CKD stage IV, creatinine is close to 2  K is ok  WBC is normal and no cough to suggest pneumonia  On xarelto so doubt he has PE  He is on room air and oxygen saturation 100%    Alternative is that he will be admitted to hospitalist for IV diuresis  He agrees with outpatient follow up  Office nurse will call after holiday  I have sent message to both nurses of Dr Allen or Jacky to follow up with him next week    Bp is improved    AFIB rate is controlled     Tony Hebert M.D.   Electrophysiology/Cardiology   [facility] [place]   [address]                      [id number] [facility] [place] [facility] [address]   [place] [address]              response to stress. The patient's heart rate recovery was normal.    Signed by: José Antonio Rico MD on 3/7/2024  4:07 PM    03/25/24    CARDIAC PROCEDURE 03/25/2024 11:40 AM (Final)    Conclusion  Findings:  1) Normal LVEDP  2) Mild to moderate coronary artery disease with predominant ectasia in LAD and RCA without high grade obstruction  3) Reduced CO by TD  4) Normal right and high normal left sided filling pressures  5)Severe MR by RODRI without pulmonary HTN      Contrast: 25cc  Access: right radial, right IJV US guided    Recommendations  1)GDMT for HFpEF and MR  3)Follow up in valve clinic    Signed by: Radha Allen MD on 3/25/2024 11:40 AM      Most recent HS troponins:  Recent Labs     08/31/24  0925   TROPHS 50       ECG: atrial fibrillation occasional PVC      Review of Systems:    [x]All other systems reviewed and all negative except as written in HPI    [] Patient unable to provide secondary to condition    Past Medical History:   Diagnosis Date    Atrial fibrillation (HCC)     CKD (chronic kidney disease)     Heart failure (HCC)     Hypertension      Past Surgical History:   Procedure Laterality Date    CARDIAC PROCEDURE N/A 03/25/2024    Left and right heart cath / coronary angiography performed by Radha Allen MD at [facility] CARDIAC CATH LAB    CARDIAC PROCEDURE N/A 03/25/2024    Rodri during cath case performed by Radha Allen MD at [facility] CARDIAC CATH LAB    CARDIAC SURGERY N/A 05/09/2024    TRANSCATHETER EDGE TO EDGE REPAIR performed by Radha Allen MD at [facility] OPEN HEART    CATARACT EXTRACTION Bilateral     ENDOSCOPY, COLON, DIAGNOSTIC      HEMORRHOID SURGERY      LAPAROSCOPIC APPENDECTOMY N/A 8/4/2024    APPENDECTOMY LAPAROSCOPIC performed by Casimiro Alvarez MD at [facility] MAIN OR    MITRAL VALVE REPAIR  05/09/2024    RETINAL DETACHMENT SURGERY Bilateral      Social Hx:  reports that he quit smoking about 60 years ago. His smoking use included cigarettes. He started smoking about 62 years ago. He has a 2  CHEST W WO CONTRAST    INDICATION: Shortness of breath    COMPARISON: None.    TECHNIQUE: Helical thin section chest CT following intravenous administration of  nonionic contrast 100 mL of isovue 370 according to departmental PE protocol.  Coronal and sagittal reformats were performed. 3D post processing was performed.  CT dose reduction was achieved through the use of a standardized protocol  tailored for this examination and automatic exposure control for dose  modulation.    FINDINGS: This is a good quality study for the evaluation of pulmonary embolism  to the first subsegmental arterial level. There is no pulmonary embolism to this  level.      MEDIASTINUM: No mass or lymphadenopathy.  RADHA: No mass or lymphadenopathy.  THORACIC AORTA: No aneurysm or dissection.  HEART: Dilatation of the left and right atria.  ESOPHAGUS: No wall thickening or dilatation.  TRACHEA/BRONCHI: Patent.  PLEURA: Moderate right pleural effusion and smaller left pleural effusion.  LUNGS: Right lower lobe consolidation. Left basilar atelectasis. 2 mm pulmonary  nodule left upper lobe (image 49).  UPPER ABDOMEN: Partially imaged. No acute pathology.  BONES: No aggressive bone lesion or fracture.    Impression  Moderate right pleural effusion with right lower lobe consolidation. Smaller  left pleural effusion with underlying atelectasis. No evidence of pulmonary  embolism or aortic dissection.      Electronically signed by SPENCER RODRÍGUEZ      Lab Results   Component Value Date/Time     08/31/2024 09:25 AM    K 3.8 08/31/2024 09:25 AM     08/31/2024 09:25 AM    CO2 28 08/31/2024 09:25 AM    BUN 23 08/31/2024 09:25 AM    CREATININE 1.95 08/31/2024 09:25 AM    GLUCOSE 117 08/31/2024 09:25 AM    CALCIUM 9.4 08/31/2024 09:25 AM    LABGLOM 34 08/31/2024 09:25 AM    LABGLOM 30 04/26/2024 11:06 AM       Lab Results   Component Value Date    WBC 4.6 08/31/2024    HGB 13.9 08/31/2024    HCT 42.7 08/31/2024    MCV 85.4 08/31/2024    PLT

## 2024-08-31 NOTE — ED PROVIDER NOTES
Diagnosis Date    Atrial fibrillation (HCC)     CKD (chronic kidney disease)     Heart failure (HCC)     Hypertension          SURGICAL HISTORY       Past Surgical History:   Procedure Laterality Date    CARDIAC PROCEDURE N/A 03/25/2024    Left and right heart cath / coronary angiography performed by Radha Allen MD at [facility] CARDIAC CATH LAB    CARDIAC PROCEDURE N/A 03/25/2024    Rodri during cath case performed by Radha Allen MD at [facility] CARDIAC CATH LAB    CARDIAC SURGERY N/A 05/09/2024    TRANSCATHETER EDGE TO EDGE REPAIR performed by Radha Allen MD at [facility] OPEN HEART    CATARACT EXTRACTION Bilateral     ENDOSCOPY, COLON, DIAGNOSTIC      HEMORRHOID SURGERY      LAPAROSCOPIC APPENDECTOMY N/A 8/4/2024    APPENDECTOMY LAPAROSCOPIC performed by Casimiro Alvarez MD at [facility] MAIN OR    MITRAL VALVE REPAIR  05/09/2024    RETINAL DETACHMENT SURGERY Bilateral          CURRENT MEDICATIONS       Discharge Medication List as of 8/31/2024 11:20 AM        CONTINUE these medications which have NOT CHANGED    Details   spironolactone (ALDACTONE) 25 MG tablet Take 1 tablet by mouth daily, Disp-14 tablet, R-0Normal      KLOR-CON M20 20 MEQ extended release tablet Take 1 tablet by mouth daily, Disp-90 tablet, R-3, DAWNormal      pantoprazole (PROTONIX) 40 MG tablet Take 1 tablet by mouth every morning (before breakfast), Disp-30 tablet, R-3Normal      amLODIPine (NORVASC) 5 MG tablet Take 1 tablet by mouth dailyHistorical Med      losartan (COZAAR) 100 MG tablet Take 1 tablet by mouth daily, Disp-90 tablet, R-1Normal      rivaroxaban (XARELTO) 15 MG TABS tablet Take 1 tablet by mouth Daily with supper, Disp-30 tablet, R-2NO PRINT      acetaminophen (TYLENOL) 325 MG tablet Take 2 tablets by mouth every 4 hours as needed for PainOTC      doxazosin (CARDURA) 4 MG tablet Take 1 tablet by mouth daily, Disp-30 tablet, R-3Normal      carvedilol (COREG) 25 MG tablet Take 1 tablet by mouth 2 times dailyHistorical Med      bimatoprost  and independent interpretation performed. Decision-making details documented in ED Course.    Risk  Prescription drug management.  Decision regarding hospitalization.            ED Course            CONSULTS:  IP CONSULT TO CARDIOLOGY    PROCEDURES:  Unless otherwise noted below, none     Procedures      FINAL IMPRESSION      1. Acute congestive heart failure, unspecified heart failure type (HCC)    2. Chronic kidney disease, unspecified CKD stage    3. Peripheral edema          DISPOSITION/PLAN   DISPOSITION Decision To Discharge 08/31/2024 11:19:46 AM      PATIENT REFERRED TO:  Radha Allen MD  [address] [place] [address]  [phone]    Schedule an appointment as soon as possible for a visit in 3 days        DISCHARGE MEDICATIONS:  Discharge Medication List as of 8/31/2024 11:20 AM            Tam Monson MD (electronically signed)  Emergency Attending Physician        Tam Monson MD  09/01/24 [id number]

## 2024-08-31 NOTE — ED NOTES
9:04 AM  I have evaluated the patient as the Provider in Rapid Medical Evaluation (RME). I have reviewed his vital signs and the triage nurse assessment. I have talked with the patient and any available family and advised that I am the provider in triage and have ordered the appropriate study to initiate their work up based on the clinical presentation during my assessment. I have advised that the patient will be accommodated in the Main ED as soon as possible. I have also requested to contact the triage nurse or myself immediately if the patient experiences any changes in their condition during this brief waiting period.  MARY Lee . is a 79 y.o. male presenting for chronic heart failure, TRISHA, Afib, Cardiac valve clip, followed by cardiology with upcoming appt, presenting to ED for gradually worsening SOB over the past couple of weeks.  Associated bilateral leg swelling, productive cough.  Recent surgery for rupture appendicitis 08/04/2024.  3+ pitting edema BLE.  Lungs CLTA.  Normal heart rhythm.  VSS.  NAD.     Davie Khan PA-C  08/31/24 0914

## 2024-09-03 ENCOUNTER — TELEPHONE (OUTPATIENT)
Age: 79
End: 2024-09-03

## 2024-09-03 DIAGNOSIS — I48.20 CHRONIC A-FIB (HCC): Primary | ICD-10-CM

## 2024-09-03 DIAGNOSIS — I10 MALIGNANT HYPERTENSION: ICD-10-CM

## 2024-09-03 NOTE — TELEPHONE ENCOUNTER
----- Message from Dr. José Antonio Rico MD sent at 8/31/2024  8:58 PM EDT -----  Thx Eric. I'm in the hospital this coming week. May need to see Jeimy/NP.  ----- Message -----  From: Tony Hebert MD  Sent: 8/31/2024  11:37 AM EDT  To: Jillian Pryor, APRN - CNP; #    Seen in ER  MR repair in May with Alejandro  In ER 2 times with dyspnea  Small pleural effusion and mild pulm edema  I increased lasix to 40 bid  Not sure if Dr Rico or Alejandro have time to follow up next week, need BMP  He has CKD   LM for pt to call our office as no identifier on VM.

## 2024-09-03 NOTE — TELEPHONE ENCOUNTER
Telephone call made to patient's wife, on hippa. Two patient identifiers verified.   Rescheduled follow up for sooner. The patient didn't want to go to [place] office and that is the only availability the np had. Let wife know the patient will need labs drawn either before his appt or after. Verified understanding.     Future Appointments   Date Time Provider Department Center   9/16/2024 10:00 AM José Antonio Rico MD [facility]   [facility]/18/2025 10:30 AM [facility]   4/18/2025 11:30 AM Destiny Wolfe APRN - NP [facility] BS AMB

## 2024-09-11 ENCOUNTER — OFFICE VISIT (OUTPATIENT)
Age: 79
End: 2024-09-11
Payer: MEDICARE

## 2024-09-11 VITALS
HEART RATE: 44 BPM | RESPIRATION RATE: 16 BRPM | SYSTOLIC BLOOD PRESSURE: 120 MMHG | DIASTOLIC BLOOD PRESSURE: 70 MMHG | BODY MASS INDEX: 24.62 KG/M2 | HEIGHT: 70 IN | OXYGEN SATURATION: 97 % | WEIGHT: 172 LBS

## 2024-09-11 DIAGNOSIS — I10 MALIGNANT HYPERTENSION: ICD-10-CM

## 2024-09-11 DIAGNOSIS — I48.20 CHRONIC A-FIB (HCC): ICD-10-CM

## 2024-09-11 DIAGNOSIS — I50.32 CHRONIC HEART FAILURE WITH PRESERVED EJECTION FRACTION (HCC): ICD-10-CM

## 2024-09-11 DIAGNOSIS — I34.0 SEVERE MITRAL REGURGITATION: Primary | ICD-10-CM

## 2024-09-11 DIAGNOSIS — I36.1 NONRHEUMATIC TRICUSPID VALVE REGURGITATION: ICD-10-CM

## 2024-09-11 LAB
ANION GAP SERPL CALC-SCNC: 4 MMOL/L (ref 2–12)
BUN SERPL-MCNC: 27 MG/DL (ref 6–20)
BUN/CREAT SERPL: 13 (ref 12–20)
CALCIUM SERPL-MCNC: 9.8 MG/DL (ref 8.5–10.1)
CHLORIDE SERPL-SCNC: 110 MMOL/L (ref 97–108)
CO2 SERPL-SCNC: 29 MMOL/L (ref 21–32)
CREAT SERPL-MCNC: 2.14 MG/DL (ref 0.7–1.3)
GLUCOSE SERPL-MCNC: 110 MG/DL (ref 65–100)
POTASSIUM SERPL-SCNC: 3.8 MMOL/L (ref 3.5–5.1)
SODIUM SERPL-SCNC: 143 MMOL/L (ref 136–145)

## 2024-09-11 PROCEDURE — 99214 OFFICE O/P EST MOD 30 MIN: CPT | Performed by: INTERNAL MEDICINE

## 2024-09-11 PROCEDURE — G8420 CALC BMI NORM PARAMETERS: HCPCS | Performed by: INTERNAL MEDICINE

## 2024-09-11 PROCEDURE — 3078F DIAST BP <80 MM HG: CPT | Performed by: INTERNAL MEDICINE

## 2024-09-11 PROCEDURE — 3074F SYST BP LT 130 MM HG: CPT | Performed by: INTERNAL MEDICINE

## 2024-09-11 PROCEDURE — 1123F ACP DISCUSS/DSCN MKR DOCD: CPT | Performed by: INTERNAL MEDICINE

## 2024-09-11 PROCEDURE — G8428 CUR MEDS NOT DOCUMENT: HCPCS | Performed by: INTERNAL MEDICINE

## 2024-09-11 PROCEDURE — 1036F TOBACCO NON-USER: CPT | Performed by: INTERNAL MEDICINE

## 2024-09-11 ASSESSMENT — PATIENT HEALTH QUESTIONNAIRE - PHQ9
SUM OF ALL RESPONSES TO PHQ QUESTIONS 1-9: 0
2. FEELING DOWN, DEPRESSED OR HOPELESS: NOT AT ALL
SUM OF ALL RESPONSES TO PHQ QUESTIONS 1-9: 0
SUM OF ALL RESPONSES TO PHQ QUESTIONS 1-9: 0
1. LITTLE INTEREST OR PLEASURE IN DOING THINGS: NOT AT ALL
SUM OF ALL RESPONSES TO PHQ9 QUESTIONS 1 & 2: 0
SUM OF ALL RESPONSES TO PHQ QUESTIONS 1-9: 0

## 2024-09-16 ENCOUNTER — OFFICE VISIT (OUTPATIENT)
Age: 79
End: 2024-09-16
Payer: MEDICARE

## 2024-09-16 ENCOUNTER — TELEPHONE (OUTPATIENT)
Facility: HOSPITAL | Age: 79
End: 2024-09-16

## 2024-09-16 VITALS
HEIGHT: 70 IN | DIASTOLIC BLOOD PRESSURE: 70 MMHG | BODY MASS INDEX: 24.34 KG/M2 | SYSTOLIC BLOOD PRESSURE: 110 MMHG | WEIGHT: 170 LBS | HEART RATE: 55 BPM | OXYGEN SATURATION: 93 %

## 2024-09-16 DIAGNOSIS — I48.20 CHRONIC A-FIB (HCC): ICD-10-CM

## 2024-09-16 DIAGNOSIS — Z79.01 CHRONIC ANTICOAGULATION: ICD-10-CM

## 2024-09-16 DIAGNOSIS — I10 BENIGN ESSENTIAL HTN: ICD-10-CM

## 2024-09-16 DIAGNOSIS — I34.0 SEVERE MITRAL REGURGITATION: Primary | ICD-10-CM

## 2024-09-16 DIAGNOSIS — Z98.890 S/P MITRAL VALVE CLIP IMPLANTATION: ICD-10-CM

## 2024-09-16 DIAGNOSIS — Z95.818 S/P MITRAL VALVE CLIP IMPLANTATION: ICD-10-CM

## 2024-09-16 PROCEDURE — 99214 OFFICE O/P EST MOD 30 MIN: CPT | Performed by: INTERNAL MEDICINE

## 2024-09-16 RX ORDER — SPIRONOLACTONE 25 MG/1
25 TABLET ORAL DAILY
Qty: 90 TABLET | Refills: 1 | Status: SHIPPED | OUTPATIENT
Start: 2024-09-16

## 2024-09-16 ASSESSMENT — PATIENT HEALTH QUESTIONNAIRE - PHQ9
2. FEELING DOWN, DEPRESSED OR HOPELESS: NOT AT ALL
SUM OF ALL RESPONSES TO PHQ QUESTIONS 1-9: 0
SUM OF ALL RESPONSES TO PHQ9 QUESTIONS 1 & 2: 0
1. LITTLE INTEREST OR PLEASURE IN DOING THINGS: NOT AT ALL
SUM OF ALL RESPONSES TO PHQ QUESTIONS 1-9: 0

## 2024-10-14 ENCOUNTER — TELEPHONE (OUTPATIENT)
Facility: HOSPITAL | Age: 79
End: 2024-10-14

## 2024-10-14 NOTE — TELEPHONE ENCOUNTER
I attempted to call and leave a voicemail for Brent Sabillon Jr. regarding his return to Cardiac Rehab program. I could not get through and was getting a busy signal. We will continue to follow up with the patient regarding his return.  Poppy Gutierrez, RN, BSN

## 2024-12-26 RX ORDER — LOSARTAN POTASSIUM 100 MG/1
100 TABLET ORAL DAILY
Qty: 90 TABLET | Refills: 1 | Status: SHIPPED | OUTPATIENT
Start: 2024-12-26

## 2024-12-26 NOTE — TELEPHONE ENCOUNTER
Requested Prescriptions     Signed Prescriptions Disp Refills    losartan (COZAAR) 100 MG tablet 90 tablet 1     Sig: TAKE 1 TABLET BY MOUTH EVERY DAY     Authorizing Provider: NIKHIL RICO     Ordering User: JAHAIRA PETERSEN MD    Future Appointments   Date Time Provider Department Center   1/8/2025 10:00 AM [facility]   1/8/2025 11:00 AM Nikhil Rico MD [facility]   4/18/2025 10:30 AM [facility] ECHO LAB 1 [facility]   4/18/2025 11:30 AM Destiny Wolfe, APRN - NP [facility] BS AMB

## 2025-01-07 RX ORDER — SPIRONOLACTONE 25 MG/1
25 TABLET ORAL DAILY
Qty: 90 TABLET | Refills: 1 | Status: SHIPPED | OUTPATIENT
Start: 2025-01-07

## 2025-01-07 NOTE — TELEPHONE ENCOUNTER
Requested Prescriptions     Signed Prescriptions Disp Refills    spironolactone (ALDACTONE) 25 MG tablet 90 tablet 1     Sig: TAKE 1 TABLET BY MOUTH EVERY DAY     Authorizing Provider: NIKHIL RICO     Ordering User: JAHAIRA PETERSEN MD    Future Appointments   Date Time Provider Department Center   1/8/2025 10:00 AM [facility]   1/8/2025 11:00 AM Nikhil Rico MD [facility]   4/18/2025 10:30 AM [facility] ECHO LAB 1 [facility]   4/18/2025 11:30 AM Destiny Wolfe, APRN - NP [facility] BS AMB

## 2025-01-08 ENCOUNTER — ANCILLARY PROCEDURE (OUTPATIENT)
Age: 80
End: 2025-01-08
Payer: MEDICARE

## 2025-01-08 ENCOUNTER — OFFICE VISIT (OUTPATIENT)
Age: 80
End: 2025-01-08
Payer: MEDICARE

## 2025-01-08 VITALS
SYSTOLIC BLOOD PRESSURE: 120 MMHG | DIASTOLIC BLOOD PRESSURE: 78 MMHG | WEIGHT: 164 LBS | HEART RATE: 63 BPM | BODY MASS INDEX: 23.48 KG/M2 | HEIGHT: 70 IN

## 2025-01-08 VITALS
WEIGHT: 164 LBS | OXYGEN SATURATION: 99 % | HEART RATE: 63 BPM | DIASTOLIC BLOOD PRESSURE: 78 MMHG | BODY MASS INDEX: 23.53 KG/M2 | SYSTOLIC BLOOD PRESSURE: 120 MMHG

## 2025-01-08 DIAGNOSIS — I48.20 CHRONIC A-FIB (HCC): ICD-10-CM

## 2025-01-08 DIAGNOSIS — I34.0 SEVERE MITRAL REGURGITATION: Primary | ICD-10-CM

## 2025-01-08 DIAGNOSIS — I34.0 SEVERE MITRAL REGURGITATION: ICD-10-CM

## 2025-01-08 DIAGNOSIS — Z79.01 CHRONIC ANTICOAGULATION: ICD-10-CM

## 2025-01-08 DIAGNOSIS — Z98.890 S/P MITRAL VALVE CLIP IMPLANTATION: ICD-10-CM

## 2025-01-08 DIAGNOSIS — Z95.818 S/P MITRAL VALVE CLIP IMPLANTATION: ICD-10-CM

## 2025-01-08 DIAGNOSIS — I10 BENIGN ESSENTIAL HTN: ICD-10-CM

## 2025-01-08 LAB
ECHO AO ROOT DIAM: 3.9 CM
ECHO AO ROOT INDEX: 2.03 CM/M2
ECHO AV AREA PEAK VELOCITY: 1.8 CM2
ECHO AV AREA VTI: 1.8 CM2
ECHO AV AREA/BSA PEAK VELOCITY: 0.9 CM2/M2
ECHO AV AREA/BSA VTI: 0.9 CM2/M2
ECHO AV MEAN GRADIENT: 3 MMHG
ECHO AV MEAN VELOCITY: 0.8 M/S
ECHO AV PEAK GRADIENT: 6 MMHG
ECHO AV PEAK VELOCITY: 1.2 M/S
ECHO AV VELOCITY RATIO: 0.5
ECHO AV VTI: 22 CM
ECHO BSA: 1.92 M2
ECHO EST RA PRESSURE: 8 MMHG
ECHO IVC PROX: 2.3 CM
ECHO LA DIAMETER INDEX: 3.59 CM/M2
ECHO LA DIAMETER: 6.9 CM
ECHO LA TO AORTIC ROOT RATIO: 1.77
ECHO LA VOL A-L A2C: 463 ML (ref 18–58)
ECHO LA VOL A-L A4C: 340 ML (ref 18–58)
ECHO LA VOL BP: 373 ML (ref 18–58)
ECHO LA VOL MOD A2C: 426 ML (ref 18–58)
ECHO LA VOL MOD A4C: 318 ML (ref 18–58)
ECHO LA VOL/BSA BIPLANE: 194 ML/M2 (ref 16–34)
ECHO LA VOLUME AREA LENGTH: 403 ML
ECHO LA VOLUME INDEX A-L A2C: 241 ML/M2 (ref 16–34)
ECHO LA VOLUME INDEX A-L A4C: 177 ML/M2 (ref 16–34)
ECHO LA VOLUME INDEX AREA LENGTH: 210 ML/M2 (ref 16–34)
ECHO LA VOLUME INDEX MOD A2C: 222 ML/M2 (ref 16–34)
ECHO LA VOLUME INDEX MOD A4C: 166 ML/M2 (ref 16–34)
ECHO LV E' SEPTAL VELOCITY: 7.45 CM/S
ECHO LV EF PHYSICIAN: 65 %
ECHO LV FRACTIONAL SHORTENING: 29 % (ref 28–44)
ECHO LV INTERNAL DIMENSION DIASTOLE INDEX: 2.92 CM/M2
ECHO LV INTERNAL DIMENSION DIASTOLIC: 5.6 CM (ref 4.2–5.9)
ECHO LV INTERNAL DIMENSION SYSTOLIC INDEX: 2.08 CM/M2
ECHO LV INTERNAL DIMENSION SYSTOLIC: 4 CM
ECHO LV IVSD: 1.2 CM (ref 0.6–1)
ECHO LV MASS 2D: 313.2 G (ref 88–224)
ECHO LV MASS INDEX 2D: 163.1 G/M2 (ref 49–115)
ECHO LV POSTERIOR WALL DIASTOLIC: 1.4 CM (ref 0.6–1)
ECHO LV RELATIVE WALL THICKNESS RATIO: 0.5
ECHO LVOT AREA: 3.5 CM2
ECHO LVOT AV VTI INDEX: 0.51
ECHO LVOT DIAM: 2.1 CM
ECHO LVOT MEAN GRADIENT: 1 MMHG
ECHO LVOT PEAK GRADIENT: 2 MMHG
ECHO LVOT PEAK VELOCITY: 0.6 M/S
ECHO LVOT STROKE VOLUME INDEX: 20.4 ML/M2
ECHO LVOT SV: 39.1 ML
ECHO LVOT VTI: 11.3 CM
ECHO MV AREA VTI: 1 CM2
ECHO MV LVOT VTI INDEX: 3.47
ECHO MV MAX VELOCITY: 2 M/S
ECHO MV MEAN GRADIENT: 4 MMHG
ECHO MV MEAN VELOCITY: 0.9 M/S
ECHO MV PEAK GRADIENT: 16 MMHG
ECHO MV VTI: 39.2 CM
ECHO RIGHT VENTRICULAR SYSTOLIC PRESSURE (RVSP): 32 MMHG
ECHO RV INTERNAL DIMENSION: 4.1 CM
ECHO RV TAPSE: 2.1 CM (ref 1.7–?)
ECHO TV REGURGITANT MAX VELOCITY: 2.44 M/S
ECHO TV REGURGITANT PEAK GRADIENT: 24 MMHG

## 2025-01-08 PROCEDURE — 3074F SYST BP LT 130 MM HG: CPT | Performed by: INTERNAL MEDICINE

## 2025-01-08 PROCEDURE — G8427 DOCREV CUR MEDS BY ELIG CLIN: HCPCS | Performed by: INTERNAL MEDICINE

## 2025-01-08 PROCEDURE — 3078F DIAST BP <80 MM HG: CPT | Performed by: INTERNAL MEDICINE

## 2025-01-08 PROCEDURE — 1159F MED LIST DOCD IN RCRD: CPT | Performed by: INTERNAL MEDICINE

## 2025-01-08 PROCEDURE — 1036F TOBACCO NON-USER: CPT | Performed by: INTERNAL MEDICINE

## 2025-01-08 PROCEDURE — 99214 OFFICE O/P EST MOD 30 MIN: CPT | Performed by: INTERNAL MEDICINE

## 2025-01-08 PROCEDURE — G8420 CALC BMI NORM PARAMETERS: HCPCS | Performed by: INTERNAL MEDICINE

## 2025-01-08 PROCEDURE — 1123F ACP DISCUSS/DSCN MKR DOCD: CPT | Performed by: INTERNAL MEDICINE

## 2025-01-08 PROCEDURE — 1126F AMNT PAIN NOTED NONE PRSNT: CPT | Performed by: INTERNAL MEDICINE

## 2025-01-08 PROCEDURE — 93306 TTE W/DOPPLER COMPLETE: CPT | Performed by: INTERNAL MEDICINE

## 2025-01-08 RX ORDER — LISINOPRIL 40 MG/1
40 TABLET ORAL DAILY
COMMUNITY
Start: 2024-10-20

## 2025-01-08 RX ORDER — CLONIDINE HYDROCHLORIDE 0.1 MG/1
1 TABLET ORAL 2 TIMES DAILY
COMMUNITY

## 2025-01-08 NOTE — PROGRESS NOTES
Weight: 74.4 kg (164 lb)      Body mass index is 23.53 kg/m².  General appearance - alert, well appearing, and in no distress  Mental status - affect appropriate to mood  Eyes - sclera anicteric, moist mucous membranes  Neck - supple, no JVD  Chest - clear to auscultation, no wheezes, rales or rhonchi  Heart - normal rate, regular rhythm, normal S1, S2, no murmurs, rubs, clicks or gallops  Abdomen - soft, nontender, nondistended, no masses or organomegaly  Extremities - peripheral pulses normal, no pedal edema      Recent Labs:  No results found for: \"CHOL\", \"CHLST\", \"CHOLV\", \"336693\", \"HDL\", \"HDLC\", \"LDL\", \"LDLC\"  No results found for: \"ALBA\", \"CREAPOC\"  Lab Results   Component Value Date/Time    BUN 27 2024 09:18 AM     Lab Results   Component Value Date/Time    K 3.8 2024 09:18 AM     No results found for: \"HBA1C\"  Lab Results   Component Value Date/Time    HGB 13.9 2024 09:25 AM     Lab Results   Component Value Date/Time     2024 09:25 AM       Reviewed:  Past Medical History:   Diagnosis Date    Atrial fibrillation (HCC)     CKD (chronic kidney disease)     Heart failure (HCC)     Hypertension      Social History     Tobacco Use   Smoking Status Former    Current packs/day: 0.00    Average packs/day: 1 pack/day for 2.0 years (2.0 ttl pk-yrs)    Types: Cigarettes    Start date: 1962    Quit date: 1964    Years since quittin.0    Passive exposure: Never   Smokeless Tobacco Never     Social History     Substance and Sexual Activity   Alcohol Use No     No Known Allergies    Current Outpatient Medications   Medication Sig    cloNIDine (CATAPRES) 0.1 MG tablet Take 1 tablet by mouth in the morning and at bedtime    lisinopril (PRINIVIL;ZESTRIL) 40 MG tablet Take 1 tablet by mouth daily for high blood pressure    spironolactone (ALDACTONE) 25 MG tablet TAKE 1 TABLET BY MOUTH EVERY DAY    losartan (COZAAR) 100 MG tablet TAKE 1 TABLET BY MOUTH EVERY DAY    furosemide

## 2025-03-04 RX ORDER — FUROSEMIDE 40 MG/1
40 TABLET ORAL 2 TIMES DAILY
Qty: 90 TABLET | Refills: 3 | Status: SHIPPED | OUTPATIENT
Start: 2025-03-04

## 2025-03-04 NOTE — TELEPHONE ENCOUNTER
Requested Prescriptions     Signed Prescriptions Disp Refills    furosemide (LASIX) 40 MG tablet 90 tablet 3     Sig: TAKE 1 TABLET BY MOUTH TWICE A DAY     Authorizing Provider: NIKHIL RICO     Ordering User: JAHAIRA PETERSEN MD    Future Appointments   Date Time Provider Department Center   4/18/2025 10:30 AM [facility] ECHO LAB 1 [facility]   4/18/2025 11:30 AM Destiny Wolfe APRN - [facility] BS AMB   7/9/2025 11:40 AM Nikhil Rico MD [facility] AMB

## 2025-04-18 ENCOUNTER — OFFICE VISIT (OUTPATIENT)
Age: 80
End: 2025-04-18

## 2025-04-18 ENCOUNTER — HOSPITAL ENCOUNTER (OUTPATIENT)
Facility: HOSPITAL | Age: 80
Discharge: HOME | End: 2025-04-20
Payer: MEDICARE

## 2025-04-18 VITALS
SYSTOLIC BLOOD PRESSURE: 136 MMHG | HEART RATE: 83 BPM | OXYGEN SATURATION: 99 % | WEIGHT: 188.5 LBS | TEMPERATURE: 98.2 F | DIASTOLIC BLOOD PRESSURE: 81 MMHG | BODY MASS INDEX: 27.05 KG/M2

## 2025-04-18 DIAGNOSIS — I34.0 MITRAL VALVE INSUFFICIENCY, UNSPECIFIED ETIOLOGY: ICD-10-CM

## 2025-04-18 DIAGNOSIS — Z95.818 S/P MITRAL VALVE CLIP IMPLANTATION: Primary | ICD-10-CM

## 2025-04-18 DIAGNOSIS — Z98.890 S/P MITRAL VALVE CLIP IMPLANTATION: Primary | ICD-10-CM

## 2025-04-18 LAB
ECHO AO ASC DIAM: 4.1 CM
ECHO AO ROOT DIAM: 4 CM
ECHO AV AREA PEAK VELOCITY: 1.9 CM2
ECHO AV AREA VTI: 1.6 CM2
ECHO AV MEAN GRADIENT: 5 MMHG
ECHO AV MEAN VELOCITY: 1 M/S
ECHO AV PEAK GRADIENT: 8 MMHG
ECHO AV PEAK VELOCITY: 1.4 M/S
ECHO AV VELOCITY RATIO: 0.57
ECHO AV VTI: 28.3 CM
ECHO EST RA PRESSURE: 8 MMHG
ECHO LA DIAMETER: 7.3 CM
ECHO LA TO AORTIC ROOT RATIO: 1.83
ECHO LA VOL A-L A2C: 326 ML (ref 18–58)
ECHO LA VOL A-L A4C: 342 ML (ref 18–58)
ECHO LA VOL MOD A2C: 315 ML (ref 18–58)
ECHO LA VOL MOD A4C: 320 ML (ref 18–58)
ECHO LA VOLUME AREA LENGTH: 350 ML
ECHO LV E' LATERAL VELOCITY: 10.03 CM/S
ECHO LV E' SEPTAL VELOCITY: 5.74 CM/S
ECHO LV FRACTIONAL SHORTENING: 37 % (ref 28–44)
ECHO LV INTERNAL DIMENSION DIASTOLIC: 6 CM (ref 4.2–5.9)
ECHO LV INTERNAL DIMENSION SYSTOLIC: 3.8 CM
ECHO LV IVSD: 1.2 CM (ref 0.6–1)
ECHO LV MASS 2D: 314 G (ref 88–224)
ECHO LV POSTERIOR WALL DIASTOLIC: 1.2 CM (ref 0.6–1)
ECHO LV RELATIVE WALL THICKNESS RATIO: 0.4
ECHO LVOT AREA: 3.1 CM2
ECHO LVOT AV VTI INDEX: 0.48
ECHO LVOT DIAM: 2 CM
ECHO LVOT MEAN GRADIENT: 1 MMHG
ECHO LVOT PEAK GRADIENT: 3 MMHG
ECHO LVOT PEAK VELOCITY: 0.8 M/S
ECHO LVOT SV: 42.7 ML
ECHO LVOT VTI: 13.6 CM
ECHO MV AREA PHT: 2.2 CM2
ECHO MV AREA VTI: 1 CM2
ECHO MV EROA PISA: 0.4 CM2
ECHO MV LVOT VTI INDEX: 3
ECHO MV MAX VELOCITY: 2.2 M/S
ECHO MV MEAN GRADIENT: 6 MMHG
ECHO MV MEAN VELOCITY: 1.1 M/S
ECHO MV PEAK GRADIENT: 19 MMHG
ECHO MV PRESSURE HALF TIME (PHT): 99.3 MS
ECHO MV REGURGITANT ALIASING (NYQUIST) VELOCITY: 37 CM/S
ECHO MV REGURGITANT RADIUS PISA: 0.97 CM
ECHO MV REGURGITANT VELOCITY PISA: 5.5 M/S
ECHO MV REGURGITANT VOLUME PISA: 69.56 ML
ECHO MV REGURGITANT VTIA: 175 CM
ECHO MV VTI: 40.8 CM
ECHO PV MAX VELOCITY: 0.7 M/S
ECHO PV PEAK GRADIENT: 2 MMHG
ECHO RIGHT VENTRICULAR SYSTOLIC PRESSURE (RVSP): 55 MMHG
ECHO RV TAPSE: 1.4 CM (ref 1.7–?)
ECHO TV REGURGITANT MAX VELOCITY: 3.43 M/S
ECHO TV REGURGITANT PEAK GRADIENT: 47 MMHG

## 2025-04-18 PROCEDURE — 93306 TTE W/DOPPLER COMPLETE: CPT

## 2025-04-18 ASSESSMENT — KANSAS CITY CARDIOMYOPATHY QUESTIONNAIRE (KCCQ12)
1A. OVER THE PAST 2 WEEKS, HOW MUCH WERE YOU LIMITED BY HEART FAILURE SYMPTOMS (SHORTNESS OF BREATH OR FATIGUE) WHEN SHOWERING OR BATHING: NOT AT ALL LIMITED
5. OVER THE PAST 2 WEEKS, ON AVERAGE, HOW MANY TIMES HAVE YOU BEEN FORCED TO SLEEP SITTING UP IN A CHAIR OR WITH AT LEAST 3 PILLOWS TO PROP YOU UP BECAUSE OF SHORTNESS OF BREATH?: NEVER OVER THE PAST 2 WEEKS
1B. OVER THE PAST 2 WEEKS, HOW MUCH WERE YOU LIMITED BY HEART FAILURE SYMPTOMS (SHORTNESS OF BREATH OR FATIGUE) WHEN WALKING 1 BLOCK ON LEVEL GROUND: SLIGHTLY LIMITED
4. OVER THE PAST 2 WEEKS, ON AVERAGE, HOW MANY TIMES HAS SHORTNESS OF BREATH LIMITED YOUR ABILITY TO DO WHAT YOU WANTED: LESS THAN ONCE A WEEK
6. OVER THE PAST 2 WEEKS, HOW MUCH HAS YOUR HEART FAILURE LIMITED YOUR ENJOYMENT OF LIFE: IT HAS NOT LIMITED MY ENJOYMENT OF LIFE AT ALL
2. OVER THE PAST 2 WEEKS, HOW MANY TIMES DID YOU HAVE SWELLING IN YOUR FEET, ANKLES OR LEGS WHEN YOU WOKE UP IN THE MORNING: NEVER OVER THE PAST 2 WEEKS
1C. OVER THE PAST 2 WEEKS, HOW MUCH WERE YOU LIMITED BY HEART FAILURE SYMPTOMS (SHORTNESS OF BREATH OR FATIGUE) WHEN HURRYING OR JOGGING (AS IF TO CATCH A BUS): LIMITED FOR OTHER REASONS OR DID NOT DO THE ACTIVITY
8B. OVER THE PAST 2 WEEKS, ON AVERAGE, HOW HAS HEART FAILURE LIMITED YOU ABILITY TO WORK OR DO HOUSEHOLD CHORES: SLIGHTLY LIMITED
8A. OVER THE PAST 2 WEEKS, ON AVERAGE, HOW HAS HEART FAILURE LIMITED YOU ABILITY TO DO HOBBIES OR RECREATIONAL ACTIVITIES: DID NOT LIMIT AT ALL
7. IF YOU HAD TO SPEND THE REST OF YOUR LIFE WITH YOUR HEART FAILURE THE WAY IT IS RIGHT NOW, HOW WOULD YOU FEEL ABOUT THIS?: MOSTLY SATISFIED
8C. OVER THE PAST 2 WEEKS, ON AVERAGE, HOW HAS HEART FAILURE LIMITED YOU ABILITY TO VISIT FAMILY AND FRIENDS OUR OF YOUR HOME: DID NOT LIMIT AT ALL
3. OVER THE PAST 2 WEEKS, ON AVERAGE, HOW MANY TIMES HAS FATIGUE LIMITED YOUR ABILITY TO DO WHAT YOU WANTED: LESS THAN ONCE A WEEK

## 2025-04-18 NOTE — PROGRESS NOTES
+ little shortness of breath, longer walks, taking out the trash  Patient: Brent Sabillon Jr.   Age: 80 y.o.     Patient Care Team:  Lee Mata MD as PCP - Empaneled Provider  Kali Post MD as Consulting Physician (Cardiothoracic Surgery)  Radha Allen MD as Consulting Physician (Cardiology)    PCP: No primary care provider on file.    Cardiologist: Dr. Allen/Dr. Rico    Diagnosis/Reason for Consultation: There were no encounter diagnoses.    Problem List:   Patient Active Problem List   Diagnosis    Chronic a-fib (HCC)    Chronic anticoagulation    History of tobacco use    Malignant hypertension    CKD (chronic kidney disease)    Essential hypertension    Unstable angina (HCC)    Severe mitral regurgitation    Mitral regurgitation    Acute on chronic heart failure with preserved ejection fraction (HCC)    S/P mitral valve clip implantation    Encounter for post surgical wound check         HPI: 80 y.o. y.o. male  S/P  TRANSCATHETER EDGE TO EDGE REPAIR of the mitral valve on 5/9/24 with Dr. Allen. The patient's post operative course was complicated by hematuria requiring a Urology consultation. The patient was stable and ready for discharge on 5/11/24.  he  is here today for his  1 year post op appointment.    Denies fever, chills, worsening shortness of breath or fatigue, chest pain, orthopnea, PND, dizziness, syncope or recent fall. States his dyspnea is much improved, but still gets short of breath when he goes out to the mailbox, or on longer walks, such as walking through the hospital today. He does not notice it much, but his wife notices that he is breathing heavier. He does not feel like it is limiting his activity. He follows with Dr. Rico and a primary care provider. He did not bring his medications today, and was unsure if he was taking the lisinopril or losartan, both listed under his medications.  He believes he stopped the lisinopril and is just taking the losartan. Also

## 2025-04-22 LAB
ECHO AO ASC DIAM: 4.1 CM
ECHO AO ROOT DIAM: 4 CM
ECHO AV AREA PEAK VELOCITY: 1.9 CM2
ECHO AV AREA VTI: 1.6 CM2
ECHO AV MEAN GRADIENT: 5 MMHG
ECHO AV MEAN VELOCITY: 1 M/S
ECHO AV PEAK GRADIENT: 8 MMHG
ECHO AV PEAK VELOCITY: 1.4 M/S
ECHO AV VELOCITY RATIO: 0.57
ECHO AV VTI: 28.3 CM
ECHO EST RA PRESSURE: 8 MMHG
ECHO LA DIAMETER: 7.3 CM
ECHO LA TO AORTIC ROOT RATIO: 1.83
ECHO LA VOL A-L A2C: 326 ML (ref 18–58)
ECHO LA VOL A-L A4C: 342 ML (ref 18–58)
ECHO LA VOL MOD A2C: 315 ML (ref 18–58)
ECHO LA VOL MOD A4C: 320 ML (ref 18–58)
ECHO LA VOLUME AREA LENGTH: 350 ML
ECHO LV E' LATERAL VELOCITY: 10.03 CM/S
ECHO LV E' SEPTAL VELOCITY: 5.74 CM/S
ECHO LV EF PHYSICIAN: 60 %
ECHO LV FRACTIONAL SHORTENING: 37 % (ref 28–44)
ECHO LV INTERNAL DIMENSION DIASTOLIC: 6 CM (ref 4.2–5.9)
ECHO LV INTERNAL DIMENSION SYSTOLIC: 3.8 CM
ECHO LV IVSD: 1.2 CM (ref 0.6–1)
ECHO LV MASS 2D: 314 G (ref 88–224)
ECHO LV POSTERIOR WALL DIASTOLIC: 1.2 CM (ref 0.6–1)
ECHO LV RELATIVE WALL THICKNESS RATIO: 0.4
ECHO LVOT AREA: 3.1 CM2
ECHO LVOT AV VTI INDEX: 0.48
ECHO LVOT DIAM: 2 CM
ECHO LVOT MEAN GRADIENT: 1 MMHG
ECHO LVOT PEAK GRADIENT: 3 MMHG
ECHO LVOT PEAK VELOCITY: 0.8 M/S
ECHO LVOT SV: 42.7 ML
ECHO LVOT VTI: 13.6 CM
ECHO MV AREA PHT: 2.2 CM2
ECHO MV AREA VTI: 1 CM2
ECHO MV EROA PISA: 0.2 CM2
ECHO MV LVOT VTI INDEX: 3
ECHO MV MAX VELOCITY: 2.2 M/S
ECHO MV MEAN GRADIENT: 6 MMHG
ECHO MV MEAN VELOCITY: 1.1 M/S
ECHO MV PEAK GRADIENT: 19 MMHG
ECHO MV PRESSURE HALF TIME (PHT): 99.3 MS
ECHO MV REGURGITANT ALIASING (NYQUIST) VELOCITY: 37 CM/S
ECHO MV REGURGITANT RADIUS PISA: 0.6 CM
ECHO MV REGURGITANT VELOCITY PISA: 5.5 M/S
ECHO MV REGURGITANT VOLUME PISA: 26.62 ML
ECHO MV REGURGITANT VTIA: 175 CM
ECHO MV VTI: 40.8 CM
ECHO PV MAX VELOCITY: 0.7 M/S
ECHO PV PEAK GRADIENT: 2 MMHG
ECHO RIGHT VENTRICULAR SYSTOLIC PRESSURE (RVSP): 55 MMHG
ECHO RV TAPSE: 1.4 CM (ref 1.7–?)
ECHO TV REGURGITANT MAX VELOCITY: 3.43 M/S
ECHO TV REGURGITANT PEAK GRADIENT: 47 MMHG

## 2025-06-19 RX ORDER — LOSARTAN POTASSIUM 100 MG/1
100 TABLET ORAL DAILY
Qty: 90 TABLET | Refills: 1 | Status: SHIPPED | OUTPATIENT
Start: 2025-06-19

## 2025-06-19 NOTE — TELEPHONE ENCOUNTER
Requested Prescriptions     Signed Prescriptions Disp Refills    losartan (COZAAR) 100 MG tablet 90 tablet 1     Sig: TAKE 1 TABLET BY MOUTH EVERY DAY     Authorizing Provider: NIKHIL RICO     Ordering User: JAHAIRA PETERSEN MD    Future Appointments   Date Time Provider Department Center   7/9/2025 11:40 AM Nikhil Rico MD [facility]

## 2025-09-03 RX ORDER — FUROSEMIDE 40 MG/1
40 TABLET ORAL 2 TIMES DAILY
Qty: 180 TABLET | Refills: 1 | Status: SHIPPED | OUTPATIENT
Start: 2025-09-03

## (undated) DEVICE — SUTURE PERMA-HAND SZ 0 L30IN NONABSORBABLE BLK L36MM CT-1 424H

## (undated) DEVICE — GARMENT,MEDLINE,DVT,INT,CALF,MED, GEN2: Brand: MEDLINE

## (undated) DEVICE — Device

## (undated) DEVICE — WIRE .035 AMPLATZ SUPER STIFF STRAIGHT 260CM

## (undated) DEVICE — CHECK-FLO INTRODUCER SET: Brand: CHECK-FLO

## (undated) DEVICE — MICROPUNCTURE INTRODUCER SET SILHOUETTE TRANSITIONLESS WITH STAINLESS STEEL WIRE GUIDE: Brand: MICROPUNCTURE

## (undated) DEVICE — CO-SET DELIVERY SYSTEM FOR 123 ROOM TEMPATURE INJECTATE: Brand: CO-SET+

## (undated) DEVICE — X-RAY DETECTABLE SPONGES,16 PLY: Brand: VISTEC

## (undated) DEVICE — ATX65L

## (undated) DEVICE — BAG SPEC REM 224ML W4XL6IN DIA10MM 1 HND GYN DISP ENDOPCH

## (undated) DEVICE — MASTISOL ADHESIVE LIQ 2/3ML

## (undated) DEVICE — PRESSURE MONITORING SET: Brand: TRUWAVE

## (undated) DEVICE — PACK PROCEDURE SURG HRT CATH

## (undated) DEVICE — GLIDESHEATH SLENDER STAINLESS STEEL KIT: Brand: GLIDESHEATH SLENDER

## (undated) DEVICE — DISSECTOR ULTRASONIC L39CM CRV JAW CRDLSS SONICISION

## (undated) DEVICE — CATHETER DIAG 5FR L100CM LUMN ID0.047IN JR4 CRV 0 SIDE H

## (undated) DEVICE — 1 X VERSACROSS TRANSSEPTAL SHEATH (INCLUDING  1 X J-TIP GUIDEWIRE); 1 X VERSACROSS RF WIRE (INCLUDING 1 X CONNECTOR CABLE (SINGLE USE)); 1 X DISPERSIVE ELECTRODE: Brand: VERSACROSS ACCESS SOLUTION

## (undated) DEVICE — WRAP SURG W1.31XL1.34M CARD FOR PT 165-172CM THERMOWRP

## (undated) DEVICE — APPLIER CLP M/L SHFT DIA5MM 15 LIG LIGAMAX 5

## (undated) DEVICE — GLOVE SURG SZ 65 L12IN FNGR THK94MIL STD WHT LTX FREE

## (undated) DEVICE — OPTIFOAM GENTLE LIQUITRAP, SACRUM, 9X9: Brand: MEDLINE

## (undated) DEVICE — JELLY LUBRICATING 2.7GM H2O SOL GREASELESS E-Z

## (undated) DEVICE — KIT MFLD ISOLATN NACL CNTRST PRT TBNG SPIK W/ PRSS TRNSDUC

## (undated) DEVICE — AMPLATZ EXTRA STIFF WIRE GUIDE: Brand: AMPLATZ

## (undated) DEVICE — OPTIFOAM GENTLE LIQUITRAP, SACRUM, 7"X7": Brand: MEDLINE

## (undated) DEVICE — SPECIAL PROCEDURE DRAPE 32" X 34": Brand: SPECIAL PROCEDURE DRAPE

## (undated) DEVICE — LUER-LOK 360°: Brand: CONNECTA, LUER-LOK

## (undated) DEVICE — SOLUTION SET, MALE LUER LOCK ADAPTER

## (undated) DEVICE — Device: Brand: BMC CONNECTOR CABLE

## (undated) DEVICE — TORFLEX TRANSSEPTAL SHEATH; TRANSSEPTAL DILATOR; J-TIP GUIDEWIRE: Brand: TORFLEX TRANSSEPTAL GUIDING SHEATH

## (undated) DEVICE — COVER,TABLE,60X90,STERILE: Brand: MEDLINE

## (undated) DEVICE — STAPLER ECHELON 3000 45MM STANDARD

## (undated) DEVICE — 3M™ TEGADERM™ TRANSPARENT FILM DRESSING FRAME STYLE, 1626W, 4 IN X 4-3/4 IN (10 CM X 12 CM), 50/CT 4CT/CASE: Brand: 3M™ TEGADERM™

## (undated) DEVICE — 4-PORT MANIFOLD: Brand: NEPTUNE 2

## (undated) DEVICE — CATHETER ART THERMODILUTION 6 FRX110 CM 4 LUMEN SWAN

## (undated) DEVICE — 40418 TRENDELENBURG ONE-STEP ARM PROTECTORS LARGE (1 PAIR): Brand: 40418 TRENDELENBURG ONE-STEP ARM PROTECTORS LARGE (1 PAIR)

## (undated) DEVICE — PADDLE DEFIB SZ 2.5IN DIA6.4CM INT SPN W/ SWCH

## (undated) DEVICE — GAUZE,SPONGE,2"X2",8PLY,STERILE,LF,2'S: Brand: MEDLINE

## (undated) DEVICE — KIT AT-X65 ANGIOTOUCH HAND CONTROLLER

## (undated) DEVICE — SUTURE VICRYL + SZ 0 L27IN ABSRB VLT L26MM UR-6 5/8 CIR VCP603H

## (undated) DEVICE — KIT MED IMAG CNTRST AGNT W/ IOPAMIDOL REUSE

## (undated) DEVICE — PAD ABD 5X9IN STRL

## (undated) DEVICE — SUTURE MONOCRYL + SZ 4-0 L27IN ABSRB UD L19MM PS-2 3/8 CIR MCP426H

## (undated) DEVICE — CHECK-FLO PERFORMER INTRODUCER: Brand: CHECK-FLO

## (undated) DEVICE — SENSOR TEMP SKIN DISP

## (undated) DEVICE — TROCAR: Brand: KII® SLEEVE

## (undated) DEVICE — DRAPE SHEET ULTRAGARD: Brand: MEDLINE

## (undated) DEVICE — DRESSING FOAM W8.7XL9.1IN SAFETAC LAYR SELF ADH MEPILEX

## (undated) DEVICE — PRESSURE MONITORING ACCESSORY: Brand: TRUWAVE

## (undated) DEVICE — APPLIER CLP M L L11.4IN DIA10MM ENDOSCP ROT MULT FOR LIG

## (undated) DEVICE — STRIP,CLOSURE,WOUND,MEDI-STRIP,1/2X4: Brand: MEDLINE

## (undated) DEVICE — SOLUTION IV 1000ML 0.9% SOD CHL PH 5 INJ USP VIAFLX PLAS

## (undated) DEVICE — ANGIOGRAPHY KIT

## (undated) DEVICE — KIT HND CTRL 3 W STPCOCK ROT END 54IN PREM HI PRSS TBNG AT

## (undated) DEVICE — INSUFFLATION NEEDLE TO ESTABLISH PNEUMOPERITONEUM.: Brand: INSUFFLATION NEEDLE

## (undated) DEVICE — GENERAL LAPAROSCOPY - SMH: Brand: MEDLINE INDUSTRIES, INC.

## (undated) DEVICE — TR BAND RADIAL ARTERY COMPRESSION DEVICE: Brand: TR BAND

## (undated) DEVICE — APPLICATOR MEDICATED 26 CC SOLUTION CLR STRL CHLORAPREP

## (undated) DEVICE — PINNACLE INTRODUCER SHEATH: Brand: PINNACLE

## (undated) DEVICE — C-ARM: Brand: UNBRANDED

## (undated) DEVICE — SOLUTION 1000ML NRML NACL

## (undated) DEVICE — RELOAD STPL L45MM H1-2.6MM MESENTERY THN TISS WHT GRIPPING

## (undated) DEVICE — TROCAR: Brand: KII® OPTICAL ACCESS SYSTEM

## (undated) DEVICE — INTENDED TO STANDARDIZE OR CAMERAS TO ALLOW FOR THE USE OF THE OR CAMERA COVER: Brand: ASPEN® O.R. CAMERA COVER

## (undated) DEVICE — TOTAL TRAY, 16FR 10ML SIL FOLEY, URN: Brand: MEDLINE

## (undated) DEVICE — TROCAR: Brand: KII FIOS FIRST ENTRY

## (undated) DEVICE — STERILE SYNTHETIC POLYISOPRENE POWDER-FREE SURGICAL GLOVES WITH HYDROGEL COATING, SMOOTH FINISH, STRAIGHT FINGER: Brand: PROTEXIS

## (undated) DEVICE — 1000ML,PRESSURE INFUSER W/STOPCOCK: Brand: MEDLINE

## (undated) DEVICE — WASTE KIT - ST MARY: Brand: MEDLINE INDUSTRIES, INC.

## (undated) DEVICE — BLADE SURG NO15 C STL GLASSVAN

## (undated) DEVICE — HYPODERMIC SAFETY NEEDLE: Brand: MONOJECT

## (undated) DEVICE — GLOVE SURG SZ 75 L12IN FNGR THK94MIL STD WHT LTX FREE

## (undated) DEVICE — TIDISHIELD TRANSPORT, CONTAINMENT COVER FOR BACK TABLE 4'6" (1.37M) TO 8' (2.43M) IN LENGTH: Brand: TIDISHIELD

## (undated) DEVICE — SYRINGE MED 50ML LUERLOCK TIP

## (undated) DEVICE — GOWN,SIRUS,FABRNF,XL,20/CS: Brand: MEDLINE

## (undated) DEVICE — 1LYRTR 16FR10ML 100%SILI SNAP: Brand: MEDLINE INDUSTRIES, INC.

## (undated) DEVICE — Device: Brand: PROTRACK PIGTAIL WIRE

## (undated) DEVICE — ELECTRODE ES AD DISPER HYDRGEL THN FOAM ADH SCALLOPED EDGE

## (undated) DEVICE — CATHETER DIAG 5FR L100CM LUMN ID0.047IN JL3.5 CRV 0 SIDE H

## (undated) DEVICE — PERCLOSE PROGLIDE™ SUTURE-MEDIATED CLOSURE SYSTEM: Brand: PERCLOSE PROGLIDE™